# Patient Record
Sex: FEMALE | Race: WHITE | NOT HISPANIC OR LATINO | Employment: OTHER | URBAN - NONMETROPOLITAN AREA
[De-identification: names, ages, dates, MRNs, and addresses within clinical notes are randomized per-mention and may not be internally consistent; named-entity substitution may affect disease eponyms.]

---

## 2023-03-21 ENCOUNTER — APPOINTMENT (OUTPATIENT)
Dept: GENERAL RADIOLOGY | Facility: HOSPITAL | Age: 73
DRG: 189 | End: 2023-03-21
Payer: MEDICARE

## 2023-03-21 ENCOUNTER — APPOINTMENT (OUTPATIENT)
Dept: CT IMAGING | Facility: HOSPITAL | Age: 73
DRG: 189 | End: 2023-03-21
Payer: MEDICARE

## 2023-03-21 ENCOUNTER — HOSPITAL ENCOUNTER (INPATIENT)
Facility: HOSPITAL | Age: 73
LOS: 3 days | Discharge: HOME | DRG: 189 | End: 2023-03-24
Attending: STUDENT IN AN ORGANIZED HEALTH CARE EDUCATION/TRAINING PROGRAM | Admitting: INTERNAL MEDICINE
Payer: MEDICARE

## 2023-03-21 DIAGNOSIS — Z74.09 IMPAIRED MOBILITY AND ADLS: Chronic | ICD-10-CM

## 2023-03-21 DIAGNOSIS — J96.01 ACUTE RESPIRATORY FAILURE WITH HYPOXIA: Primary | ICD-10-CM

## 2023-03-21 DIAGNOSIS — Z78.9 IMPAIRED MOBILITY AND ADLS: Chronic | ICD-10-CM

## 2023-03-21 PROBLEM — J44.1 COPD WITH ACUTE EXACERBATION: Status: ACTIVE | Noted: 2023-03-21

## 2023-03-21 PROBLEM — J96.20 ACUTE-ON-CHRONIC RESPIRATORY FAILURE: Status: ACTIVE | Noted: 2023-03-21

## 2023-03-21 PROBLEM — J96.21 ACUTE ON CHRONIC RESPIRATORY FAILURE WITH HYPOXIA AND HYPERCAPNIA: Status: ACTIVE | Noted: 2023-03-21

## 2023-03-21 PROBLEM — I50.9 ACUTE CONGESTIVE HEART FAILURE: Status: ACTIVE | Noted: 2023-03-21

## 2023-03-21 PROBLEM — J96.22 ACUTE ON CHRONIC RESPIRATORY FAILURE WITH HYPOXIA AND HYPERCAPNIA: Status: ACTIVE | Noted: 2023-03-21

## 2023-03-21 LAB
A-A DO2: ABNORMAL
ALBUMIN SERPL-MCNC: 4.1 G/DL (ref 3.5–5.2)
ALBUMIN/GLOB SERPL: 1.2 G/DL
ALP SERPL-CCNC: 147 U/L (ref 39–117)
ALT SERPL W P-5'-P-CCNC: 23 U/L (ref 1–33)
ANION GAP SERPL CALCULATED.3IONS-SCNC: 10.9 MMOL/L (ref 5–15)
ARTERIAL PATENCY WRIST A: ABNORMAL
AST SERPL-CCNC: 25 U/L (ref 1–32)
ATMOSPHERIC PRESS: 739 MMHG
BASE EXCESS BLDA CALC-SCNC: 7.5 MMOL/L (ref 0–2)
BASOPHILS # BLD AUTO: 0.05 10*3/MM3 (ref 0–0.2)
BASOPHILS NFR BLD AUTO: 0.5 % (ref 0–1.5)
BDY SITE: ABNORMAL
BILIRUB SERPL-MCNC: 0.4 MG/DL (ref 0–1.2)
BUN SERPL-MCNC: 7 MG/DL (ref 8–23)
BUN/CREAT SERPL: 10.1 (ref 7–25)
CALCIUM SPEC-SCNC: 9.4 MG/DL (ref 8.6–10.5)
CHLORIDE SERPL-SCNC: 97 MMOL/L (ref 98–107)
CO2 SERPL-SCNC: 29.1 MMOL/L (ref 22–29)
COHGB MFR BLD: 1.4 % (ref 0–2)
CREAT SERPL-MCNC: 0.69 MG/DL (ref 0.57–1)
CRP SERPL-MCNC: 6.07 MG/DL (ref 0–0.5)
D DIMER PPP FEU-MCNC: 1.41 MCGFEU/ML (ref 0–0.72)
DEPRECATED RDW RBC AUTO: 49.5 FL (ref 37–54)
EGFRCR SERPLBLD CKD-EPI 2021: 92.3 ML/MIN/1.73
EOSINOPHIL # BLD AUTO: 0.17 10*3/MM3 (ref 0–0.4)
EOSINOPHIL NFR BLD AUTO: 1.8 % (ref 0.3–6.2)
ERYTHROCYTE [DISTWIDTH] IN BLOOD BY AUTOMATED COUNT: 15.1 % (ref 12.3–15.4)
FLUAV RNA RESP QL NAA+PROBE: NOT DETECTED
FLUBV RNA RESP QL NAA+PROBE: NOT DETECTED
GAS FLOW AIRWAY: 6 LPM
GEN 5 2HR TROPONIN T REFLEX: <6 NG/L
GLOBULIN UR ELPH-MCNC: 3.5 GM/DL
GLUCOSE SERPL-MCNC: 116 MG/DL (ref 65–99)
HCO3 BLDA-SCNC: 35.2 MMOL/L (ref 22–28)
HCT VFR BLD AUTO: 36.2 % (ref 34–46.6)
HCT VFR BLD CALC: 32 %
HGB BLD-MCNC: 10.8 G/DL (ref 12–15.9)
HOLD SPECIMEN: NORMAL
HOLD SPECIMEN: NORMAL
HYPOCHROMIA BLD QL: NORMAL
IMM GRANULOCYTES # BLD AUTO: 0.06 10*3/MM3 (ref 0–0.05)
IMM GRANULOCYTES NFR BLD AUTO: 0.6 % (ref 0–0.5)
LYMPHOCYTES # BLD AUTO: 1.04 10*3/MM3 (ref 0.7–3.1)
LYMPHOCYTES NFR BLD AUTO: 11 % (ref 19.6–45.3)
Lab: ABNORMAL
Lab: ABNORMAL
MAGNESIUM SERPL-MCNC: 2.4 MG/DL (ref 1.6–2.4)
MCH RBC QN AUTO: 26.7 PG (ref 26.6–33)
MCHC RBC AUTO-ENTMCNC: 29.8 G/DL (ref 31.5–35.7)
MCV RBC AUTO: 89.6 FL (ref 79–97)
METHGB BLD QL: 0.5 % (ref 0–1.5)
MODALITY: ABNORMAL
MONOCYTES # BLD AUTO: 0.61 10*3/MM3 (ref 0.1–0.9)
MONOCYTES NFR BLD AUTO: 6.4 % (ref 5–12)
NEUTROPHILS NFR BLD AUTO: 7.56 10*3/MM3 (ref 1.7–7)
NEUTROPHILS NFR BLD AUTO: 79.7 % (ref 42.7–76)
NOTE: ABNORMAL
NOTIFIED BY: ABNORMAL
NOTIFIED WHO: ABNORMAL
NRBC BLD AUTO-RTO: 0 /100 WBC (ref 0–0.2)
NT-PROBNP SERPL-MCNC: 258.9 PG/ML (ref 0–900)
OXYHGB MFR BLDV: 93.5 % (ref 94–99)
PCO2 BLDA: 67.3 MM HG (ref 35–45)
PCO2 TEMP ADJ BLD: ABNORMAL MM[HG]
PH BLDA: 7.33 PH UNITS (ref 7.35–7.45)
PH, TEMP CORRECTED: ABNORMAL
PHOSPHATE SERPL-MCNC: 3 MG/DL (ref 2.5–4.5)
PLATELET # BLD AUTO: 301 10*3/MM3 (ref 140–450)
PMV BLD AUTO: 9.7 FL (ref 6–12)
PO2 BLDA: 76.6 MM HG (ref 75–100)
PO2 TEMP ADJ BLD: ABNORMAL MM[HG]
POTASSIUM SERPL-SCNC: 4.9 MMOL/L (ref 3.5–5.2)
PROCALCITONIN SERPL-MCNC: 0.06 NG/ML (ref 0–0.25)
PROT SERPL-MCNC: 7.6 G/DL (ref 6–8.5)
RBC # BLD AUTO: 4.04 10*6/MM3 (ref 3.77–5.28)
SAO2 % BLDCOA: 95.4 % (ref 94–100)
SARS-COV-2 RNA RESP QL NAA+PROBE: NOT DETECTED
SMALL PLATELETS BLD QL SMEAR: ADEQUATE
SODIUM SERPL-SCNC: 137 MMOL/L (ref 136–145)
TROPONIN T DELTA: NORMAL
TROPONIN T SERPL HS-MCNC: <6 NG/L
TROPONIN T SERPL HS-MCNC: <6 NG/L
VENTILATOR MODE: ABNORMAL
WBC MORPH BLD: NORMAL
WBC NRBC COR # BLD: 9.49 10*3/MM3 (ref 3.4–10.8)
WHOLE BLOOD HOLD COAG: NORMAL
WHOLE BLOOD HOLD SPECIMEN: NORMAL

## 2023-03-21 PROCEDURE — 25010000002 FUROSEMIDE PER 20 MG: Performed by: EMERGENCY MEDICINE

## 2023-03-21 PROCEDURE — 83735 ASSAY OF MAGNESIUM: CPT | Performed by: STUDENT IN AN ORGANIZED HEALTH CARE EDUCATION/TRAINING PROGRAM

## 2023-03-21 PROCEDURE — 84484 ASSAY OF TROPONIN QUANT: CPT | Performed by: STUDENT IN AN ORGANIZED HEALTH CARE EDUCATION/TRAINING PROGRAM

## 2023-03-21 PROCEDURE — 94799 UNLISTED PULMONARY SVC/PX: CPT

## 2023-03-21 PROCEDURE — 82375 ASSAY CARBOXYHB QUANT: CPT

## 2023-03-21 PROCEDURE — 71275 CT ANGIOGRAPHY CHEST: CPT

## 2023-03-21 PROCEDURE — 99285 EMERGENCY DEPT VISIT HI MDM: CPT

## 2023-03-21 PROCEDURE — 84100 ASSAY OF PHOSPHORUS: CPT | Performed by: STUDENT IN AN ORGANIZED HEALTH CARE EDUCATION/TRAINING PROGRAM

## 2023-03-21 PROCEDURE — 94640 AIRWAY INHALATION TREATMENT: CPT

## 2023-03-21 PROCEDURE — 99223 1ST HOSP IP/OBS HIGH 75: CPT | Performed by: INTERNAL MEDICINE

## 2023-03-21 PROCEDURE — 80053 COMPREHEN METABOLIC PANEL: CPT | Performed by: STUDENT IN AN ORGANIZED HEALTH CARE EDUCATION/TRAINING PROGRAM

## 2023-03-21 PROCEDURE — 94664 DEMO&/EVAL PT USE INHALER: CPT

## 2023-03-21 PROCEDURE — 71045 X-RAY EXAM CHEST 1 VIEW: CPT

## 2023-03-21 PROCEDURE — 86140 C-REACTIVE PROTEIN: CPT | Performed by: STUDENT IN AN ORGANIZED HEALTH CARE EDUCATION/TRAINING PROGRAM

## 2023-03-21 PROCEDURE — 94761 N-INVAS EAR/PLS OXIMETRY MLT: CPT

## 2023-03-21 PROCEDURE — 25510000001 IOPAMIDOL 61 % SOLUTION: Performed by: EMERGENCY MEDICINE

## 2023-03-21 PROCEDURE — 25010000002 METHYLPREDNISOLONE PER 125 MG: Performed by: STUDENT IN AN ORGANIZED HEALTH CARE EDUCATION/TRAINING PROGRAM

## 2023-03-21 PROCEDURE — 63710000001 PREDNISONE PER 1 MG: Performed by: INTERNAL MEDICINE

## 2023-03-21 PROCEDURE — 83880 ASSAY OF NATRIURETIC PEPTIDE: CPT | Performed by: STUDENT IN AN ORGANIZED HEALTH CARE EDUCATION/TRAINING PROGRAM

## 2023-03-21 PROCEDURE — 25010000002 ENOXAPARIN PER 10 MG: Performed by: INTERNAL MEDICINE

## 2023-03-21 PROCEDURE — 36600 WITHDRAWAL OF ARTERIAL BLOOD: CPT

## 2023-03-21 PROCEDURE — 25010000002 MAGNESIUM SULFATE 2 GM/50ML SOLUTION: Performed by: STUDENT IN AN ORGANIZED HEALTH CARE EDUCATION/TRAINING PROGRAM

## 2023-03-21 PROCEDURE — 94660 CPAP INITIATION&MGMT: CPT

## 2023-03-21 PROCEDURE — 83050 HGB METHEMOGLOBIN QUAN: CPT

## 2023-03-21 PROCEDURE — 84145 PROCALCITONIN (PCT): CPT | Performed by: STUDENT IN AN ORGANIZED HEALTH CARE EDUCATION/TRAINING PROGRAM

## 2023-03-21 PROCEDURE — 87636 SARSCOV2 & INF A&B AMP PRB: CPT | Performed by: STUDENT IN AN ORGANIZED HEALTH CARE EDUCATION/TRAINING PROGRAM

## 2023-03-21 PROCEDURE — 93005 ELECTROCARDIOGRAM TRACING: CPT | Performed by: STUDENT IN AN ORGANIZED HEALTH CARE EDUCATION/TRAINING PROGRAM

## 2023-03-21 PROCEDURE — 36415 COLL VENOUS BLD VENIPUNCTURE: CPT

## 2023-03-21 PROCEDURE — 85007 BL SMEAR W/DIFF WBC COUNT: CPT | Performed by: STUDENT IN AN ORGANIZED HEALTH CARE EDUCATION/TRAINING PROGRAM

## 2023-03-21 PROCEDURE — 85379 FIBRIN DEGRADATION QUANT: CPT | Performed by: STUDENT IN AN ORGANIZED HEALTH CARE EDUCATION/TRAINING PROGRAM

## 2023-03-21 PROCEDURE — 82805 BLOOD GASES W/O2 SATURATION: CPT

## 2023-03-21 PROCEDURE — 85025 COMPLETE CBC W/AUTO DIFF WBC: CPT | Performed by: STUDENT IN AN ORGANIZED HEALTH CARE EDUCATION/TRAINING PROGRAM

## 2023-03-21 RX ORDER — LANOLIN ALCOHOL/MO/W.PET/CERES
400 CREAM (GRAM) TOPICAL 2 TIMES DAILY
Status: DISCONTINUED | OUTPATIENT
Start: 2023-03-21 | End: 2023-03-21

## 2023-03-21 RX ORDER — ONDANSETRON 2 MG/ML
4 INJECTION INTRAMUSCULAR; INTRAVENOUS EVERY 6 HOURS PRN
Status: DISCONTINUED | OUTPATIENT
Start: 2023-03-21 | End: 2023-03-24 | Stop reason: HOSPADM

## 2023-03-21 RX ORDER — SODIUM CHLORIDE 0.9 % (FLUSH) 0.9 %
10 SYRINGE (ML) INJECTION EVERY 12 HOURS SCHEDULED
Status: DISCONTINUED | OUTPATIENT
Start: 2023-03-21 | End: 2023-03-24 | Stop reason: HOSPADM

## 2023-03-21 RX ORDER — SODIUM CHLORIDE 0.9 % (FLUSH) 0.9 %
10 SYRINGE (ML) INJECTION AS NEEDED
Status: DISCONTINUED | OUTPATIENT
Start: 2023-03-21 | End: 2023-03-24 | Stop reason: HOSPADM

## 2023-03-21 RX ORDER — IPRATROPIUM BROMIDE AND ALBUTEROL SULFATE 2.5; .5 MG/3ML; MG/3ML
3 SOLUTION RESPIRATORY (INHALATION) EVERY 4 HOURS PRN
Status: DISCONTINUED | OUTPATIENT
Start: 2023-03-21 | End: 2023-03-24 | Stop reason: HOSPADM

## 2023-03-21 RX ORDER — ESCITALOPRAM OXALATE 20 MG/1
20 TABLET ORAL DAILY
Status: DISCONTINUED | OUTPATIENT
Start: 2023-03-22 | End: 2023-03-24 | Stop reason: HOSPADM

## 2023-03-21 RX ORDER — SODIUM CHLORIDE 9 MG/ML
40 INJECTION, SOLUTION INTRAVENOUS AS NEEDED
Status: DISCONTINUED | OUTPATIENT
Start: 2023-03-21 | End: 2023-03-24 | Stop reason: HOSPADM

## 2023-03-21 RX ORDER — IPRATROPIUM BROMIDE AND ALBUTEROL SULFATE 2.5; .5 MG/3ML; MG/3ML
3 SOLUTION RESPIRATORY (INHALATION) ONCE
Status: COMPLETED | OUTPATIENT
Start: 2023-03-21 | End: 2023-03-21

## 2023-03-21 RX ORDER — BUDESONIDE AND FORMOTEROL FUMARATE DIHYDRATE 160; 4.5 UG/1; UG/1
2 AEROSOL RESPIRATORY (INHALATION)
Status: DISCONTINUED | OUTPATIENT
Start: 2023-03-21 | End: 2023-03-24 | Stop reason: HOSPADM

## 2023-03-21 RX ORDER — ARIPIPRAZOLE 2 MG/1
2 TABLET ORAL DAILY
COMMUNITY

## 2023-03-21 RX ORDER — ROPINIROLE 1 MG/1
1 TABLET, FILM COATED ORAL 2 TIMES DAILY PRN
Status: DISCONTINUED | OUTPATIENT
Start: 2023-03-21 | End: 2023-03-24 | Stop reason: HOSPADM

## 2023-03-21 RX ORDER — PRAMIPEXOLE DIHYDROCHLORIDE 1 MG/1
1 TABLET ORAL 2 TIMES DAILY PRN
Status: DISCONTINUED | OUTPATIENT
Start: 2023-03-21 | End: 2023-03-24 | Stop reason: HOSPADM

## 2023-03-21 RX ORDER — GUAIFENESIN/DEXTROMETHORPHAN 100-10MG/5
10 SYRUP ORAL EVERY 6 HOURS PRN
Status: DISCONTINUED | OUTPATIENT
Start: 2023-03-21 | End: 2023-03-24 | Stop reason: HOSPADM

## 2023-03-21 RX ORDER — ACETAMINOPHEN 325 MG/1
650 TABLET ORAL EVERY 4 HOURS PRN
Status: DISCONTINUED | OUTPATIENT
Start: 2023-03-21 | End: 2023-03-24 | Stop reason: HOSPADM

## 2023-03-21 RX ORDER — FUROSEMIDE 10 MG/ML
40 INJECTION INTRAMUSCULAR; INTRAVENOUS ONCE
Status: COMPLETED | OUTPATIENT
Start: 2023-03-21 | End: 2023-03-21

## 2023-03-21 RX ORDER — LISINOPRIL 10 MG/1
10 TABLET ORAL NIGHTLY
COMMUNITY
End: 2023-04-11 | Stop reason: SDUPTHER

## 2023-03-21 RX ORDER — POLYETHYLENE GLYCOL 3350 17 G/17G
17 POWDER, FOR SOLUTION ORAL DAILY PRN
Status: DISCONTINUED | OUTPATIENT
Start: 2023-03-21 | End: 2023-03-24 | Stop reason: HOSPADM

## 2023-03-21 RX ORDER — OMEPRAZOLE 40 MG/1
40 CAPSULE, DELAYED RELEASE ORAL NIGHTLY
COMMUNITY

## 2023-03-21 RX ORDER — IBUPROFEN 400 MG/1
800 TABLET ORAL 2 TIMES DAILY PRN
COMMUNITY

## 2023-03-21 RX ORDER — AMLODIPINE BESYLATE 5 MG/1
2.5 TABLET ORAL NIGHTLY
Status: DISCONTINUED | OUTPATIENT
Start: 2023-03-21 | End: 2023-03-24 | Stop reason: HOSPADM

## 2023-03-21 RX ORDER — CALCIUM CARBONATE 200(500)MG
2 TABLET,CHEWABLE ORAL 2 TIMES DAILY PRN
Status: DISCONTINUED | OUTPATIENT
Start: 2023-03-21 | End: 2023-03-24 | Stop reason: HOSPADM

## 2023-03-21 RX ORDER — ARIPIPRAZOLE 2 MG/1
2 TABLET ORAL DAILY
Status: DISCONTINUED | OUTPATIENT
Start: 2023-03-22 | End: 2023-03-24 | Stop reason: HOSPADM

## 2023-03-21 RX ORDER — AMLODIPINE BESYLATE 2.5 MG/1
2.5 TABLET ORAL NIGHTLY
COMMUNITY

## 2023-03-21 RX ORDER — AMOXICILLIN 250 MG
2 CAPSULE ORAL 2 TIMES DAILY
Status: DISCONTINUED | OUTPATIENT
Start: 2023-03-21 | End: 2023-03-24 | Stop reason: HOSPADM

## 2023-03-21 RX ORDER — IBUPROFEN 800 MG/1
800 TABLET ORAL 2 TIMES DAILY PRN
Status: DISCONTINUED | OUTPATIENT
Start: 2023-03-21 | End: 2023-03-24 | Stop reason: HOSPADM

## 2023-03-21 RX ORDER — ONDANSETRON 4 MG/1
4 TABLET, FILM COATED ORAL EVERY 6 HOURS PRN
Status: DISCONTINUED | OUTPATIENT
Start: 2023-03-21 | End: 2023-03-24 | Stop reason: HOSPADM

## 2023-03-21 RX ORDER — BISACODYL 10 MG
10 SUPPOSITORY, RECTAL RECTAL DAILY PRN
Status: DISCONTINUED | OUTPATIENT
Start: 2023-03-21 | End: 2023-03-24 | Stop reason: HOSPADM

## 2023-03-21 RX ORDER — ESCITALOPRAM OXALATE 20 MG/1
20 TABLET ORAL DAILY
COMMUNITY

## 2023-03-21 RX ORDER — FUROSEMIDE 10 MG/ML
40 INJECTION INTRAMUSCULAR; INTRAVENOUS
Status: DISCONTINUED | OUTPATIENT
Start: 2023-03-22 | End: 2023-03-24 | Stop reason: HOSPADM

## 2023-03-21 RX ORDER — LISINOPRIL 10 MG/1
10 TABLET ORAL NIGHTLY
Status: DISCONTINUED | OUTPATIENT
Start: 2023-03-21 | End: 2023-03-24 | Stop reason: HOSPADM

## 2023-03-21 RX ORDER — PRAMIPEXOLE DIHYDROCHLORIDE 1 MG/1
1 TABLET ORAL 2 TIMES DAILY PRN
COMMUNITY

## 2023-03-21 RX ORDER — ENOXAPARIN SODIUM 100 MG/ML
40 INJECTION SUBCUTANEOUS EVERY 12 HOURS
Status: DISCONTINUED | OUTPATIENT
Start: 2023-03-21 | End: 2023-03-24 | Stop reason: HOSPADM

## 2023-03-21 RX ORDER — PREDNISONE 20 MG/1
40 TABLET ORAL
Status: DISCONTINUED | OUTPATIENT
Start: 2023-03-21 | End: 2023-03-24 | Stop reason: HOSPADM

## 2023-03-21 RX ORDER — LANOLIN ALCOHOL/MO/W.PET/CERES
400 CREAM (GRAM) TOPICAL 2 TIMES DAILY
COMMUNITY

## 2023-03-21 RX ORDER — ROPINIROLE 1 MG/1
1 TABLET, FILM COATED ORAL 2 TIMES DAILY PRN
COMMUNITY
End: 2023-04-11

## 2023-03-21 RX ORDER — PANTOPRAZOLE SODIUM 40 MG/1
40 TABLET, DELAYED RELEASE ORAL
Status: DISCONTINUED | OUTPATIENT
Start: 2023-03-22 | End: 2023-03-24 | Stop reason: HOSPADM

## 2023-03-21 RX ORDER — BISACODYL 5 MG/1
5 TABLET, DELAYED RELEASE ORAL DAILY PRN
Status: DISCONTINUED | OUTPATIENT
Start: 2023-03-21 | End: 2023-03-24 | Stop reason: HOSPADM

## 2023-03-21 RX ORDER — MAGNESIUM SULFATE HEPTAHYDRATE 40 MG/ML
2 INJECTION, SOLUTION INTRAVENOUS ONCE
Status: COMPLETED | OUTPATIENT
Start: 2023-03-21 | End: 2023-03-21

## 2023-03-21 RX ORDER — METHYLPREDNISOLONE SODIUM SUCCINATE 125 MG/2ML
125 INJECTION, POWDER, LYOPHILIZED, FOR SOLUTION INTRAMUSCULAR; INTRAVENOUS ONCE
Status: COMPLETED | OUTPATIENT
Start: 2023-03-21 | End: 2023-03-21

## 2023-03-21 RX ORDER — GUAIFENESIN 200 MG/1
400 TABLET ORAL 2 TIMES DAILY
COMMUNITY
End: 2023-04-11

## 2023-03-21 RX ORDER — CHOLECALCIFEROL (VITAMIN D3) 125 MCG
5 CAPSULE ORAL NIGHTLY
Status: DISCONTINUED | OUTPATIENT
Start: 2023-03-21 | End: 2023-03-24 | Stop reason: HOSPADM

## 2023-03-21 RX ADMIN — Medication 5 MG: at 23:08

## 2023-03-21 RX ADMIN — IBUPROFEN 800 MG: 800 TABLET, FILM COATED ORAL at 23:08

## 2023-03-21 RX ADMIN — METHYLPREDNISOLONE SODIUM SUCCINATE 125 MG: 125 INJECTION, POWDER, FOR SOLUTION INTRAMUSCULAR; INTRAVENOUS at 17:19

## 2023-03-21 RX ADMIN — IPRATROPIUM BROMIDE AND ALBUTEROL SULFATE 3 ML: .5; 3 SOLUTION RESPIRATORY (INHALATION) at 17:16

## 2023-03-21 RX ADMIN — PRAMIPEXOLE DIHYDROCHLORIDE 1 MG: 1 TABLET ORAL at 23:08

## 2023-03-21 RX ADMIN — MAGNESIUM SULFATE HEPTAHYDRATE 2 G: 40 INJECTION, SOLUTION INTRAVENOUS at 17:20

## 2023-03-21 RX ADMIN — Medication 10 ML: at 23:09

## 2023-03-21 RX ADMIN — PREDNISONE 40 MG: 20 TABLET ORAL at 23:07

## 2023-03-21 RX ADMIN — LISINOPRIL 10 MG: 10 TABLET ORAL at 23:08

## 2023-03-21 RX ADMIN — IOPAMIDOL 100 ML: 612 INJECTION, SOLUTION INTRAVENOUS at 19:26

## 2023-03-21 RX ADMIN — FUROSEMIDE 40 MG: 10 INJECTION, SOLUTION INTRAMUSCULAR; INTRAVENOUS at 20:51

## 2023-03-21 RX ADMIN — ENOXAPARIN SODIUM 40 MG: 100 INJECTION SUBCUTANEOUS at 23:08

## 2023-03-21 RX ADMIN — ROPINIROLE HYDROCHLORIDE 1 MG: 1 TABLET, FILM COATED ORAL at 23:08

## 2023-03-21 RX ADMIN — AMLODIPINE BESYLATE 2.5 MG: 5 TABLET ORAL at 23:08

## 2023-03-21 NOTE — ED PROVIDER NOTES
Subjective  History of Present Illness:    Patient is a 73-year-old female with history of COPD and hypertension who presents today with shortness of breath.  Patient states that over the last 3 days she has had increasing shortness of breath and dyspnea with exertion.  States that she is recently moved to the area and had been moving into her new home.  She states that today she was unable to get up out of chair due to dyspnea and she presented to our emergency department for evaluation.  Initial pulse ox sat of 56% on presentation.  She denies any chest pain at this time.  Nonproductive cough.  Denies any preceding fever or chills.  No abdominal pain, nausea, vomiting, diarrhea.  Denies any urinary symptoms.  No abnormal vaginal bleeding or discharge.  Denies any unilateral leg swelling or leg pain.  Patient does have significant bilateral pedal edema which she says is baseline for her.  She denies history of heart failure.      Nurses Notes reviewed and agree, including vitals, allergies, social history and prior medical history.     REVIEW OF SYSTEMS: All systems reviewed and not pertinent unless noted.  Review of Systems   Constitutional: Positive for activity change and fatigue. Negative for appetite change, chills and fever.   HENT: Negative for rhinorrhea, sinus pressure and sinus pain.    Eyes: Negative for discharge and itching.   Respiratory: Positive for cough and shortness of breath. Negative for wheezing.    Cardiovascular: Negative for chest pain and leg swelling.   Gastrointestinal: Negative for abdominal distention, abdominal pain, nausea and vomiting.   Endocrine: Negative for cold intolerance and heat intolerance.   Genitourinary: Negative for decreased urine volume, difficulty urinating, flank pain, frequency, urgency, vaginal bleeding, vaginal discharge and vaginal pain.   Musculoskeletal: Negative for gait problem, neck pain and neck stiffness.   Skin: Negative for color change.  "  Allergic/Immunologic: Negative for environmental allergies.   Neurological: Negative for seizures, syncope, facial asymmetry and speech difficulty.   Psychiatric/Behavioral: Negative for self-injury and suicidal ideas.       Past Medical History:   Diagnosis Date   • COPD (chronic obstructive pulmonary disease) (HCC)    • Hypertension        Allergies:    Codeine, Sulfa antibiotics, and Tetracyclines & related      Past Surgical History:   Procedure Laterality Date   • BREAST BIOPSY     • VENA CAVA FILTER PLACEMENT           Social History     Socioeconomic History   • Marital status: Single   Tobacco Use   • Smoking status: Former     Packs/day: 1.00     Years: 25.00     Pack years: 25.00     Types: Cigarettes   Substance and Sexual Activity   • Alcohol use: Yes     Comment: occassional   • Drug use: Never         History reviewed. No pertinent family history.    Objective  Physical Exam:  /66   Pulse 80   Temp 98.3 °F (36.8 °C) (Oral)   Resp 22   Ht 160 cm (63\")   Wt 90.7 kg (200 lb)   SpO2 96%   BMI 35.43 kg/m²      Physical Exam  Constitutional:       General: She is not in acute distress.     Appearance: Normal appearance. She is obese. She is not ill-appearing.   HENT:      Head: Normocephalic and atraumatic.      Nose: Nose normal. No congestion or rhinorrhea.      Mouth/Throat:      Mouth: Mucous membranes are dry.      Pharynx: Oropharynx is clear. No oropharyngeal exudate or posterior oropharyngeal erythema.   Eyes:      Extraocular Movements: Extraocular movements intact.      Conjunctiva/sclera: Conjunctivae normal.      Pupils: Pupils are equal, round, and reactive to light.   Cardiovascular:      Rate and Rhythm: Normal rate and regular rhythm.      Pulses: Normal pulses.      Heart sounds: Normal heart sounds.   Pulmonary:      Effort: Pulmonary effort is normal. Tachypnea present. No respiratory distress.      Breath sounds: Examination of the right-upper field reveals wheezing. " Examination of the left-upper field reveals wheezing. Examination of the right-middle field reveals decreased breath sounds. Examination of the left-middle field reveals decreased breath sounds. Examination of the right-lower field reveals decreased breath sounds. Examination of the left-lower field reveals decreased breath sounds. Decreased breath sounds and wheezing present.   Abdominal:      General: Abdomen is flat. Bowel sounds are normal. There is no distension.      Palpations: Abdomen is soft.      Tenderness: There is no abdominal tenderness.   Musculoskeletal:         General: No swelling or tenderness. Normal range of motion.      Cervical back: Normal range of motion and neck supple.   Skin:     General: Skin is warm and dry.      Capillary Refill: Capillary refill takes less than 2 seconds.   Neurological:      General: No focal deficit present.      Mental Status: She is alert and oriented to person, place, and time. Mental status is at baseline.      Cranial Nerves: No cranial nerve deficit.      Sensory: No sensory deficit.      Motor: No weakness.      Coordination: Coordination normal.   Psychiatric:         Mood and Affect: Mood normal.         Behavior: Behavior normal.         Thought Content: Thought content normal.         Judgment: Judgment normal.         Critical Care  Performed by: Akash Rocha DO  Authorized by: Akash Rocha DO     Critical care provider statement:     Critical care time (minutes):  36    Critical care time was exclusive of:  Separately billable procedures and treating other patients    Critical care was necessary to treat or prevent imminent or life-threatening deterioration of the following conditions:  Respiratory failure    Critical care was time spent personally by me on the following activities:  Development of treatment plan with patient or surrogate, evaluation of patient's response to treatment, examination of patient, obtaining history from patient or  surrogate, ordering and performing treatments and interventions, ordering and review of laboratory studies, ordering and review of radiographic studies, pulse oximetry, re-evaluation of patient's condition and review of old charts    I assumed direction of critical care for this patient from another provider in my specialty: yes      Care discussed with: admitting provider          ED Course:    ED Course as of 03/21/23 2107   Tue Mar 21, 2023   1731 EKG interpreted by me, normal sinus rhythm with no concerning ST changes noted, rate of 79 [JE]      ED Course User Index  [JE] Dustin Dunham MD       Lab Results (last 24 hours)     Procedure Component Value Units Date/Time    CBC & Differential [[id number]]  (Abnormal) Collected: 03/21/23 1716    Specimen: Blood Updated: 03/21/23 1743    Narrative:      The following orders were created for panel order CBC & Differential.  Procedure                               Abnormality         Status                     ---------                               -----------         ------                     CBC Auto Differential[[id number]]        Abnormal            Final result               Scan Slide[[id number]]                                       Final result                 Please view results for these tests on the individual orders.    Comprehensive Metabolic Panel [[id number]]  (Abnormal) Collected: 03/21/23 1716    Specimen: Blood Updated: 03/21/23 1740     Glucose 116 mg/dL      BUN 7 mg/dL      Creatinine 0.69 mg/dL      Sodium 137 mmol/L      Potassium 4.9 mmol/L      Comment: Slight hemolysis detected by analyzer. Results may be affected.        Chloride 97 mmol/L      CO2 29.1 mmol/L      Calcium 9.4 mg/dL      Total Protein 7.6 g/dL      Albumin 4.1 g/dL      ALT (SGPT) 23 U/L      AST (SGOT) 25 U/L      Alkaline Phosphatase 147 U/L      Total Bilirubin 0.4 mg/dL      Globulin 3.5 gm/dL      A/G Ratio 1.2 g/dL      BUN/Creatinine Ratio 10.1     Anion Gap 10.9  "mmol/L      eGFR 92.3 mL/min/1.73     Narrative:      GFR Normal >60  Chronic Kidney Disease <60  Kidney Failure <15    The GFR formula is only valid for adults with stable renal function between ages 18 and 70.    BNP [[id number]]  (Normal) Collected: 03/21/23 1716    Specimen: Blood Updated: 03/21/23 1743     proBNP 258.9 pg/mL     Narrative:      Among patients with dyspnea, NT-proBNP is highly sensitive for the detection of acute congestive heart failure. In addition NT-proBNP of <300 pg/ml effectively rules out acute congestive heart failure with 99% negative predictive value.    Results may be falsely decreased if patient taking Biotin.      D-dimer, Quantitative [[id number]]  (Abnormal) Collected: 03/21/23 1716    Specimen: Blood Updated: 03/21/23 1807     D-Dimer, Quantitative 1.41 MCGFEU/mL     Narrative:      According to the assay 's published package insert, a normal (<0.50 MCGFEU/mL) D-dimer result in conjunction with a non-high clinical probability assessment, excludes deep vein thrombosis (DVT) and pulmonary embolism (PE) with high sensitivity.    D-dimer values increase with age and this can make VTE exclusion of an older population difficult. To address this, the [employer], based on best available evidence and recent guidelines, recommends that clinicians use age-adjusted D-dimer thresholds in patients greater than 50 years of age with: a) a low probability of PE who do not meet all Pulmonary Embolism Rule Out Criteria, or b) in those with intermediate probability of PE.   The formula for an age-adjusted D-dimer cut-off is \"age/100\".  For example, a 60 year old patient would have an age-adjusted cut-off of 0.60 MCGFEU/mL and an 80 year old 0.80 MCGFEU/mL.    High Sensitivity Troponin T [[id number]]  (Normal) Collected: 03/21/23 1716    Specimen: Blood Updated: 03/21/23 1743     HS Troponin T <6 ng/L     Narrative:      High Sensitive Troponin T Reference Range:  <10.0 " "ng/L- Negative Female for AMI  <15.0 ng/L- Negative Male for AMI  >=10 - Abnormal Female indicating possible myocardial injury.  >=15 - Abnormal Male indicating possible myocardial injury.   Clinicians would have to utilize clinical acumen, EKG, Troponin, and serial changes to determine if it is an Acute Myocardial Infarction or myocardial injury due to an underlying chronic condition.         C-reactive Protein [[id number]]  (Abnormal) Collected: 03/21/23 1716    Specimen: Blood Updated: 03/21/23 1740     C-Reactive Protein 6.07 mg/dL     Procalcitonin [[id number]]  (Normal) Collected: 03/21/23 1716    Specimen: Blood Updated: 03/21/23 1749     Procalcitonin 0.06 ng/mL     Narrative:      As a Marker for Sepsis (Non-Neonates):    1. <0.5 ng/mL represents a low risk of severe sepsis and/or septic shock.  2. >2 ng/mL represents a high risk of severe sepsis and/or septic shock.    As a Marker for Lower Respiratory Tract Infections that require antibiotic therapy:    PCT on Admission    Antibiotic Therapy       6-12 Hrs later    >0.5                Strongly Recommended  >0.25 - <0.5        Recommended   0.1 - 0.25          Discouraged              Remeasure/reassess PCT  <0.1                Strongly Discouraged     Remeasure/reassess PCT    As 28 day mortality risk marker: \"Change in Procalcitonin Result\" (>80% or <=80%) if Day 0 (or Day 1) and Day 4 values are available. Refer to http://www.[facility]-pct-calculator.com    Change in PCT <=80%  A decrease of PCT levels below or equal to 80% defines a positive change in PCT test result representing a higher risk for 28-day all-cause mortality of patients diagnosed with severe sepsis for septic shock.    Change in PCT >80%  A decrease of PCT levels of more than 80% defines a negative change in PCT result representing a lower risk for 28-day all-cause mortality of patients diagnosed with severe sepsis or septic shock.       Magnesium [[id number]]  (Normal) Collected: 03/21/23 " 1716    Specimen: Blood Updated: 03/21/23 1740     Magnesium 2.4 mg/dL     Phosphorus [[id number]]  (Normal) Collected: 03/21/23 1716    Specimen: Blood Updated: 03/21/23 1740     Phosphorus 3.0 mg/dL     CBC Auto Differential [[id number]]  (Abnormal) Collected: 03/21/23 1716    Specimen: Blood Updated: 03/21/23 1743     WBC 9.49 10*3/mm3      RBC 4.04 10*6/mm3      Hemoglobin 10.8 g/dL      Hematocrit 36.2 %      MCV 89.6 fL      MCH 26.7 pg      MCHC 29.8 g/dL      RDW 15.1 %      RDW-SD 49.5 fl      MPV 9.7 fL      Platelets 301 10*3/mm3      Neutrophil % 79.7 %      Lymphocyte % 11.0 %      Monocyte % 6.4 %      Eosinophil % 1.8 %      Basophil % 0.5 %      Immature Grans % 0.6 %      Neutrophils, Absolute 7.56 10*3/mm3      Lymphocytes, Absolute 1.04 10*3/mm3      Monocytes, Absolute 0.61 10*3/mm3      Eosinophils, Absolute 0.17 10*3/mm3      Basophils, Absolute 0.05 10*3/mm3      Immature Grans, Absolute 0.06 10*3/mm3      nRBC 0.0 /100 WBC     Single High Sensitivity Troponin T [[id number]]  (Normal) Collected: 03/21/23 1716    Specimen: Blood Updated: 03/21/23 1743     HS Troponin T <6 ng/L     Narrative:      High Sensitive Troponin T Reference Range:  <10.0 ng/L- Negative Female for AMI  <15.0 ng/L- Negative Male for AMI  >=10 - Abnormal Female indicating possible myocardial injury.  >=15 - Abnormal Male indicating possible myocardial injury.   Clinicians would have to utilize clinical acumen, EKG, Troponin, and serial changes to determine if it is an Acute Myocardial Infarction or myocardial injury due to an underlying chronic condition.         Scan Slide [[id number]] Collected: 03/21/23 1716    Specimen: Blood Updated: 03/21/23 1743     Hypochromia Slight/1+     WBC Morphology Normal     Platelet Estimate Adequate    COVID-19 and FLU A/B PCR - Swab, Nasopharynx [[id number]]  (Normal) Collected: 03/21/23 1717    Specimen: Swab from Nasopharynx Updated: 03/21/23 1744     COVID19 Not Detected     Influenza A  PCR Not Detected     Influenza B PCR Not Detected    Narrative:      Fact sheet for providers: https://www.fda.gov/media/623497/download    Fact sheet for patients: https://www.fda.gov/media/191045/download    Test performed by PCR.    Blood Gas, Arterial With Co-Ox [[id number]]  (Abnormal) Collected: 03/21/23 1723    Specimen: Arterial Blood Updated: 03/21/23 1723     Site Right Radial     Reddy's Test N/A     pH, Arterial 7.327 pH units      Comment: 84 Value below reference range        pCO2, Arterial 67.3 mm Hg      Comment: 86 Value above critical limit        pO2, Arterial 76.6 mm Hg      Comment: 84 Value below reference range        HCO3, Arterial 35.2 mmol/L      Comment: 83 Value above reference range        Base Excess, Arterial 7.5 mmol/L      Comment: 83 Value above reference range        O2 Saturation, Arterial 95.4 %      Hematocrit, Blood Gas 32.0 %      Comment: 84 Value below reference range        Oxyhemoglobin 93.5 %      Comment: 84 Value below reference range        Methemoglobin 0.50 %      Carboxyhemoglobin 1.4 %      A-a DO2 --     Comment: UNABLE TO CALCULATE        Barometric Pressure for Blood Gas 739 mmHg      Modality Nasal Cannula     Flow Rate 6.0 lpm      Ventilator Mode NA     Note --     Notified Who ROBERT HERNANDEZ MD     Notified By [id number]     Notified Time 03/21/2023 16:36     Collected by ELMA RRT     Comment: Meter: [id number]     :  [id number]        pH, Temp Corrected --     pCO2, Temperature Corrected --     pO2, Temperature Corrected --    High Sensitivity Troponin T 2Hr [[id number]] Collected: 03/21/23 1942    Specimen: Blood Updated: 03/21/23 2011     HS Troponin T <6 ng/L      Troponin T Delta --     Comment: Unable to calculate.       Narrative:      High Sensitive Troponin T Reference Range:  <10.0 ng/L- Negative Female for AMI  <15.0 ng/L- Negative Male for AMI  >=10 - Abnormal Female indicating possible myocardial injury.  >=15 - Abnormal Male indicating possible  myocardial injury.   Clinicians would have to utilize clinical acumen, EKG, Troponin, and serial changes to determine if it is an Acute Myocardial Infarction or myocardial injury due to an underlying chronic condition.                CT Angiogram Chest Pulmonary Embolism    Result Date: 3/21/2023  FINAL REPORT TECHNIQUE: Thin section axial images were obtained through the chest and during the arterial phase of IV contrast administration. Coronal 3-D MIP reconstructed images were also provided. CLINICAL HISTORY: Pulmonary embolism (PE) suspected, positive D-dimer  soa FINDINGS: The pulmonary arteries are well-opacified and there is no filling defect to indicate pulmonary embolism. The thoracic aorta is normal.  There are small bilateral pleural effusions and interstitial pulmonary edema.  There is mild mediastinal adenopathy also likely due to edema.  There is no significant osseous abnormality.     Impression: No pulmonary embolism.  Interstitial pulmonary edema with small pleural effusions. Authenticated and Electronically Signed by Semaj Marshall M.D. on 03/21/2023 08:54:31 PM         MDM    Initial impression of presenting illness: Shortness of breath    DDX: includes but is not limited to: PE, bacterial pneumonia, viral URI, COPD exacerbation, heart failure exacerbation    Patient arrives guarded with vitals interpreted by myself.     Pertinent features from physical exam: Nonpitting pedal edema bilateral, wheezing in the upper lobes with decreased sounds in the lower lobes, tachypneic on exam.    Initial diagnostic plan: CBC, CMP, troponin, ECG, chest x-ray, CRP, Pro-Johnathon, mag, Phos    Results from initial plan were reviewed and interpreted by me revealing elevated D-dimer, CT PE ordered  21:07 EDT  I received care from Dr. Dunham for follow-up of CT.  CT reveals interstitial pulmonary edema with small pleural effusions.  No PE.  Added Lasix.  Patient still on BiPAP due to oxygen desaturations with light  activity.  Diagnostic information from other sources: Old record review    Interventions / Re-evaluation: Given DuoNeb, magnesium, Solu-Medrol with improvement of symptoms    Results/clinical rationale were discussed with patient and family member at the bedside    Consultations/Discussion of results with other physicians: Dr. Fu    Disposition plan: Admission for further care  -----    Final diagnoses:   Acute respiratory failure with hypoxia (HCC)        Akash Rocha,   03/21/23 0985

## 2023-03-22 ENCOUNTER — APPOINTMENT (OUTPATIENT)
Dept: CARDIOLOGY | Facility: HOSPITAL | Age: 73
DRG: 189 | End: 2023-03-22
Payer: MEDICARE

## 2023-03-22 LAB
ALBUMIN SERPL-MCNC: 4 G/DL (ref 3.5–5.2)
ALBUMIN/GLOB SERPL: 1.2 G/DL
ALP SERPL-CCNC: 142 U/L (ref 39–117)
ALT SERPL W P-5'-P-CCNC: 20 U/L (ref 1–33)
ANION GAP SERPL CALCULATED.3IONS-SCNC: 9.8 MMOL/L (ref 5–15)
AST SERPL-CCNC: 20 U/L (ref 1–32)
BH CV ECHO MEAS - AO MAX PG: 18.3 MMHG
BH CV ECHO MEAS - AO MEAN PG: 10 MMHG
BH CV ECHO MEAS - AO ROOT DIAM: 2.6 CM
BH CV ECHO MEAS - AO V2 MAX: 214 CM/SEC
BH CV ECHO MEAS - AO V2 VTI: 47.2 CM
BH CV ECHO MEAS - AVA(I,D): 1.85 CM2
BH CV ECHO MEAS - EDV(CUBED): 130.3 ML
BH CV ECHO MEAS - EDV(MOD-SP2): 94.5 ML
BH CV ECHO MEAS - EDV(MOD-SP4): 71.6 ML
BH CV ECHO MEAS - EF(MOD-BP): 71.6 %
BH CV ECHO MEAS - EF(MOD-SP2): 76.1 %
BH CV ECHO MEAS - EF(MOD-SP4): 67.3 %
BH CV ECHO MEAS - ESV(CUBED): 38.3 ML
BH CV ECHO MEAS - ESV(MOD-SP2): 22.6 ML
BH CV ECHO MEAS - ESV(MOD-SP4): 23.4 ML
BH CV ECHO MEAS - FS: 33.5 %
BH CV ECHO MEAS - IVS/LVPW: 1.04 CM
BH CV ECHO MEAS - IVSD: 1.17 CM
BH CV ECHO MEAS - LA DIMENSION: 3.6 CM
BH CV ECHO MEAS - LAT PEAK E' VEL: 9.7 CM/SEC
BH CV ECHO MEAS - LV MASS(C)D: 223.9 GRAMS
BH CV ECHO MEAS - LV MAX PG: 10.1 MMHG
BH CV ECHO MEAS - LV MEAN PG: 6 MMHG
BH CV ECHO MEAS - LV V1 MAX: 159 CM/SEC
BH CV ECHO MEAS - LV V1 VTI: 35 CM
BH CV ECHO MEAS - LVIDD: 5.1 CM
BH CV ECHO MEAS - LVIDS: 3.4 CM
BH CV ECHO MEAS - LVOT AREA: 2.49 CM2
BH CV ECHO MEAS - LVOT DIAM: 1.78 CM
BH CV ECHO MEAS - LVPWD: 1.12 CM
BH CV ECHO MEAS - MED PEAK E' VEL: 9.8 CM/SEC
BH CV ECHO MEAS - MV A MAX VEL: 131 CM/SEC
BH CV ECHO MEAS - MV DEC TIME: 0.18 MSEC
BH CV ECHO MEAS - MV E MAX VEL: 111 CM/SEC
BH CV ECHO MEAS - MV E/A: 0.85
BH CV ECHO MEAS - MV MAX PG: 10.5 MMHG
BH CV ECHO MEAS - MV MEAN PG: 4 MMHG
BH CV ECHO MEAS - MV V2 VTI: 41.3 CM
BH CV ECHO MEAS - MVA(VTI): 2.11 CM2
BH CV ECHO MEAS - PA ACC TIME: 0.14 SEC
BH CV ECHO MEAS - PA PR(ACCEL): 17.3 MMHG
BH CV ECHO MEAS - PA V2 MAX: 123 CM/SEC
BH CV ECHO MEAS - RAP SYSTOLE: 15 MMHG
BH CV ECHO MEAS - RV MAX PG: 3.4 MMHG
BH CV ECHO MEAS - RV V1 MAX: 92.2 CM/SEC
BH CV ECHO MEAS - RV V1 VTI: 22.5 CM
BH CV ECHO MEAS - SV(LVOT): 87.1 ML
BH CV ECHO MEAS - SV(MOD-SP2): 71.9 ML
BH CV ECHO MEAS - SV(MOD-SP4): 48.2 ML
BH CV ECHO MEAS - TAPSE (>1.6): 2.21 CM
BH CV ECHO MEASUREMENTS AVERAGE E/E' RATIO: 11.38
BH CV XLRA - TDI S': 11.5 CM/SEC
BILIRUB SERPL-MCNC: 0.3 MG/DL (ref 0–1.2)
BUN SERPL-MCNC: 12 MG/DL (ref 8–23)
BUN/CREAT SERPL: 14.3 (ref 7–25)
CALCIUM SPEC-SCNC: 9.1 MG/DL (ref 8.6–10.5)
CHLORIDE SERPL-SCNC: 94 MMOL/L (ref 98–107)
CO2 SERPL-SCNC: 33.2 MMOL/L (ref 22–29)
CREAT SERPL-MCNC: 0.84 MG/DL (ref 0.57–1)
DEPRECATED RDW RBC AUTO: 50.2 FL (ref 37–54)
EGFRCR SERPLBLD CKD-EPI 2021: 73.9 ML/MIN/1.73
ERYTHROCYTE [DISTWIDTH] IN BLOOD BY AUTOMATED COUNT: 14.9 % (ref 12.3–15.4)
GLOBULIN UR ELPH-MCNC: 3.3 GM/DL
GLUCOSE SERPL-MCNC: 237 MG/DL (ref 65–99)
HCT VFR BLD AUTO: 34.2 % (ref 34–46.6)
HGB BLD-MCNC: 9.9 G/DL (ref 12–15.9)
LEFT ATRIUM VOLUME INDEX: 28.8 ML/M2
MAXIMAL PREDICTED HEART RATE: 148 BPM
MCH RBC QN AUTO: 26.3 PG (ref 26.6–33)
MCHC RBC AUTO-ENTMCNC: 28.9 G/DL (ref 31.5–35.7)
MCV RBC AUTO: 91 FL (ref 79–97)
PLATELET # BLD AUTO: 299 10*3/MM3 (ref 140–450)
PMV BLD AUTO: 9.4 FL (ref 6–12)
POTASSIUM SERPL-SCNC: 5.2 MMOL/L (ref 3.5–5.2)
PROT SERPL-MCNC: 7.3 G/DL (ref 6–8.5)
RBC # BLD AUTO: 3.76 10*6/MM3 (ref 3.77–5.28)
SODIUM SERPL-SCNC: 137 MMOL/L (ref 136–145)
STRESS TARGET HR: 126 BPM
WBC NRBC COR # BLD: 6.82 10*3/MM3 (ref 3.4–10.8)

## 2023-03-22 PROCEDURE — 63710000001 PREDNISONE PER 1 MG: Performed by: INTERNAL MEDICINE

## 2023-03-22 PROCEDURE — 85027 COMPLETE CBC AUTOMATED: CPT | Performed by: INTERNAL MEDICINE

## 2023-03-22 PROCEDURE — 99232 SBSQ HOSP IP/OBS MODERATE 35: CPT | Performed by: STUDENT IN AN ORGANIZED HEALTH CARE EDUCATION/TRAINING PROGRAM

## 2023-03-22 PROCEDURE — 94660 CPAP INITIATION&MGMT: CPT

## 2023-03-22 PROCEDURE — 80053 COMPREHEN METABOLIC PANEL: CPT | Performed by: INTERNAL MEDICINE

## 2023-03-22 PROCEDURE — 94799 UNLISTED PULMONARY SVC/PX: CPT

## 2023-03-22 PROCEDURE — 25010000002 FUROSEMIDE PER 20 MG: Performed by: INTERNAL MEDICINE

## 2023-03-22 PROCEDURE — 93306 TTE W/DOPPLER COMPLETE: CPT

## 2023-03-22 PROCEDURE — 25010000002 ENOXAPARIN PER 10 MG: Performed by: INTERNAL MEDICINE

## 2023-03-22 PROCEDURE — 93306 TTE W/DOPPLER COMPLETE: CPT | Performed by: INTERNAL MEDICINE

## 2023-03-22 RX ADMIN — ROPINIROLE HYDROCHLORIDE 1 MG: 1 TABLET, FILM COATED ORAL at 21:05

## 2023-03-22 RX ADMIN — TIOTROPIUM BROMIDE INHALATION SPRAY 2 PUFF: 3.12 SPRAY, METERED RESPIRATORY (INHALATION) at 07:33

## 2023-03-22 RX ADMIN — ENOXAPARIN SODIUM 40 MG: 100 INJECTION SUBCUTANEOUS at 09:52

## 2023-03-22 RX ADMIN — AMLODIPINE BESYLATE 2.5 MG: 5 TABLET ORAL at 21:06

## 2023-03-22 RX ADMIN — LISINOPRIL 10 MG: 10 TABLET ORAL at 21:05

## 2023-03-22 RX ADMIN — PREDNISONE 40 MG: 20 TABLET ORAL at 08:00

## 2023-03-22 RX ADMIN — ESCITALOPRAM OXALATE 20 MG: 20 TABLET ORAL at 08:00

## 2023-03-22 RX ADMIN — MAGNESIUM GLUCONATE 500 MG ORAL TABLET 400 MG: 500 TABLET ORAL at 00:04

## 2023-03-22 RX ADMIN — BUDESONIDE AND FORMOTEROL FUMARATE DIHYDRATE 2 PUFF: 160; 4.5 AEROSOL RESPIRATORY (INHALATION) at 21:06

## 2023-03-22 RX ADMIN — BUDESONIDE AND FORMOTEROL FUMARATE DIHYDRATE 2 PUFF: 160; 4.5 AEROSOL RESPIRATORY (INHALATION) at 00:52

## 2023-03-22 RX ADMIN — FUROSEMIDE 40 MG: 10 INJECTION, SOLUTION INTRAMUSCULAR; INTRAVENOUS at 08:01

## 2023-03-22 RX ADMIN — MAGNESIUM GLUCONATE 500 MG ORAL TABLET 400 MG: 500 TABLET ORAL at 21:06

## 2023-03-22 RX ADMIN — Medication 10 ML: at 21:05

## 2023-03-22 RX ADMIN — PRAMIPEXOLE DIHYDROCHLORIDE 1 MG: 1 TABLET ORAL at 21:06

## 2023-03-22 RX ADMIN — MAGNESIUM GLUCONATE 500 MG ORAL TABLET 400 MG: 500 TABLET ORAL at 08:02

## 2023-03-22 RX ADMIN — ARIPIPRAZOLE 2 MG: 2 TABLET ORAL at 08:01

## 2023-03-22 RX ADMIN — PANTOPRAZOLE SODIUM 40 MG: 40 TABLET, DELAYED RELEASE ORAL at 06:02

## 2023-03-22 RX ADMIN — Medication 10 ML: at 08:02

## 2023-03-22 RX ADMIN — FUROSEMIDE 40 MG: 10 INJECTION, SOLUTION INTRAMUSCULAR; INTRAVENOUS at 17:05

## 2023-03-22 RX ADMIN — ENOXAPARIN SODIUM 40 MG: 100 INJECTION SUBCUTANEOUS at 21:06

## 2023-03-22 RX ADMIN — BUDESONIDE AND FORMOTEROL FUMARATE DIHYDRATE 2 PUFF: 160; 4.5 AEROSOL RESPIRATORY (INHALATION) at 07:33

## 2023-03-22 NOTE — PAYOR COMM NOTE
"TO:BC  FROM:CELIA GÓMEZ, RN PHONE [phone]  IN CLINICALS REF# [id number]    Deisi Alvarez (73 y.o. Female)     Date of Birth   1950    Social Security Number       Address   [address] [place], [place] [address]    Home Phone   [phone]    MRN   [id number]       Buddhism   None    Marital Status   Single                            Admission Date   3/21/23    Admission Type   Emergency    Admitting Provider   Bird Fu DO    Attending Provider   Kerley, Brian Joseph, DO    Department, Room/Bed   [facility] TELEMETRY 3, 320/1       Discharge Date       Discharge Disposition       Discharge Destination                               Attending Provider: Kerley, Brian Joseph, DO    Allergies: Codeine, Sulfa Antibiotics, Tetracyclines & Related    Isolation: None   Infection: COVID (rule out) (23)   Code Status: CPR    Ht: 160 cm (63\")   Wt: 119 kg (261 lb 7.5 oz)    Admission Cmt: None   Principal Problem: Acute on chronic respiratory failure with hypoxia and hypercapnia (HCC) [J96.21,J96.22]                 Active Insurance as of 3/21/2023     Primary Coverage     Payor Plan Insurance Group Employer/Plan Group    ANTHEM MEDICARE REPLACEMENT ANTHEM MEDICARE ADVANTAGE [id number]     Payor Plan Address Payor Plan Phone Number Payor Plan Fax Number Effective Dates    [address] [phone]  2022 - None Entered    [facility] [id number]       Subscriber Name Subscriber Birth Date Member ID       DEISI ALVAREZ 1950 [id number]                 Emergency Contacts      (Rel.) Home Phone Work Phone Mobile Phone    DES DUVAL (Daughter) [phone] -- --    CastilloRodrigo (Son) -- -- [phone]               History & Physical      Bird Fu DO at 23 2234            [facility] HOSPITALIST   HISTORY AND PHYSICAL      Name:  Deisi Alvarez   Age:  73 y.o.  Sex:  female  :  1950  MRN:  [id number] "   Visit Number:  [id number]  Admission Date:  3/21/2023  Date Of Service:  03/21/23  Primary Care Physician:  Provider, No Known    Chief Complaint:     Shortness of air    History Of Presenting Illness:      73-year-old lady new to the area after moving from [place] to [place] and then now to [place] history of COPD with history of exacerbations.  Reports was in the hospital 4 months ago with flu and pneumonia.  Also reports was treated outpatient for slight pneumonia with Levaquin and Decadron 2 months ago.  Reports chronic lower extremity edema and a heart murmur but reports she has never had an echo.  Reports that her breathing had actually been doing better up until about 3 days ago.  Also her chronic cough had improved.  However over that period of time its worse and is well.  Consider dry nonproductive cough.  No fevers reported.  In the emergency department it was reported that the patient had required BiPAP and by the time of my exam she was on a nonrebreather mask.  She elects to be full code.  Declines advanced directives.    Pertinent findings: pH 7.327, PCO2 67.3, bicarb 29.1, alkaline phosphatase 147, CRP 6.07, COVID and flu negative, chest x-ray shows a bilateral predominantly basilar interstitial haziness with bibasilar effusions left greater than right without focal consolidation, CT of the chest shows moderate to severe emphysema bilateral effusions, bilateral groundglass opacities, multiple reticulonodular which may be chronic or related to acute disease process would recommend repeat CT to follow-up on this in 4 weeks.    ED Medications:    Medications   sodium chloride 0.9 % flush 10 mL (has no administration in time range)   methylPREDNISolone sodium succinate (SOLU-Medrol) injection 125 mg (125 mg Intravenous Given 3/21/23 1719)   magnesium sulfate 2g/50 mL (PREMIX) infusion (0 g Intravenous Stopped 3/21/23 1820)   ipratropium-albuterol (DUO-NEB) nebulizer solution 3 mL (3 mL  Nebulization Given 3/21/23 1716)   iopamidol (ISOVUE-300) 61 % injection 100 mL (100 mL Intravenous Given 3/21/23 1926)   furosemide (LASIX) injection 40 mg (40 mg Intravenous Given 3/21/23 2051)       Edited by: Bird Fu DO at 3/21/2023 2226     Review Of Systems:    All systems were reviewed and negative except as mentioned in history of presenting illness, assessment and plan.    Past Medical History: Patient  has a past medical history of COPD (chronic obstructive pulmonary disease) (HCC) and Hypertension.    Past Surgical History: Patient  has a past surgical history that includes Vena cava filter placement and Breast biopsy.    Social History: Patient  reports that she has quit smoking. Her smoking use included cigarettes. She has a 25.00 pack-year smoking history. She does not have any smokeless tobacco history on file. She reports current alcohol use. She reports that she does not use drugs.    Family History:  Patient's family history has been reviewed and found to be noncontributory.     Allergies:      Codeine, Sulfa antibiotics, and Tetracyclines & related    Home Medications:    Prior to Admission Medications     Prescriptions Last Dose Informant Patient Reported? Taking?    amLODIPine (NORVASC) 2.5 MG tablet 3/21/2023  Yes Yes    Take 1 tablet by mouth Every Night.    ARIPiprazole (ABILIFY) 2 MG tablet 3/21/2023  Yes Yes    Take 1 tablet by mouth Daily.    escitalopram (LEXAPRO) 20 MG tablet 3/20/2023  Yes Yes    Take 1 tablet by mouth Daily.    lisinopril (PRINIVIL,ZESTRIL) 10 MG tablet   Yes No    Take 1 tablet by mouth Daily.            Vital Signs:  Temp:  [98.1 °F (36.7 °C)-98.3 °F (36.8 °C)] 98.1 °F (36.7 °C)  Heart Rate:  [] 104  Resp:  [22-34] 22  BP: (116-167)/() 150/69        03/21/23  1708 03/21/23 2223   Weight: 90.7 kg (200 lb) 119 kg (262 lb 5.6 oz)     Body mass index is 46.47 kg/m².    Physical Exam:     Most recent vital Signs: /69 (BP Location: Left arm,  "Patient Position: Sitting)   Pulse 104   Temp 98.1 °F (36.7 °C) (Oral)   Resp 22   Ht 160 cm (63\")   Wt 119 kg (262 lb 5.6 oz)   SpO2 94%   BMI 46.47 kg/m²     Constitutional: Awake, alert  Eyes: PERRLA, sclerae anicteric, no conjunctival injection  HENT: NCAT, mucous membranes moist  Neck: Supple, no thyromegaly, no lymphadenopathy, trachea midline  Respiratory: Diminished breath sounds bilaterally with crackles and expiratory wheeze bilaterally, nonlabored respirations   Cardiovascular: RRR, no murmurs, rubs, or gallops, palpable pedal pulses bilaterally  Gastrointestinal: Positive bowel sounds, soft, nontender, nondistended  Musculoskeletal: 2+ bilateral ankle edema, no clubbing or cyanosis to extremities  Psychiatric: Appropriate affect, cooperative  Neurologic: Oriented x 3, speech clear  Skin: No rashes  Edited by: Bird Fu DO at 3/21/2023 2226      Laboratory data:    I have reviewed the labs done in the emergency room.    Results from last 7 days   Lab Units 03/21/23  1716   SODIUM mmol/L 137   POTASSIUM mmol/L 4.9   CHLORIDE mmol/L 97*   CO2 mmol/L 29.1*   BUN mg/dL 7*   CREATININE mg/dL 0.69   CALCIUM mg/dL 9.4   BILIRUBIN mg/dL 0.4   ALK PHOS U/L 147*   ALT (SGPT) U/L 23   AST (SGOT) U/L 25   GLUCOSE mg/dL 116*     Results from last 7 days   Lab Units 03/21/23  1716   WBC 10*3/mm3 9.49   HEMOGLOBIN g/dL 10.8*   HEMATOCRIT % 36.2   PLATELETS 10*3/mm3 301         Results from last 7 days   Lab Units 03/21/23  1942 03/21/23  1716   HSTROP T ng/L <6 <6  <6     Results from last 7 days   Lab Units 03/21/23  1716   PROBNP pg/mL 258.9             Results from last 7 days   Lab Units 03/21/23  1723   PH, ARTERIAL pH units 7.327*   PO2 ART mm Hg 76.6   PCO2, ARTERIAL mm Hg 67.3*   HCO3 ART mmol/L 35.2*           Invalid input(s): USDES,  BLOODU, NITRITITE, BACT, EP    Pain Management Panel    There is no flowsheet data to display.         EKG:      Sinus rhythm without acute ischemic changes " noted    Radiology:    CT Angiogram Chest Pulmonary Embolism    Result Date: 3/21/2023  FINAL REPORT TECHNIQUE: Thin section axial images were obtained through the chest and during the arterial phase of IV contrast administration. Coronal 3-D MIP reconstructed images were also provided. CLINICAL HISTORY: Pulmonary embolism (PE) suspected, positive D-dimer  soa FINDINGS: The pulmonary arteries are well-opacified and there is no filling defect to indicate pulmonary embolism. The thoracic aorta is normal.  There are small bilateral pleural effusions and interstitial pulmonary edema.  There is mild mediastinal adenopathy also likely due to edema.  There is no significant osseous abnormality.     No pulmonary embolism.  Interstitial pulmonary edema with small pleural effusions. Authenticated and Electronically Signed by Semaj Marshall M.D. on 03/21/2023 08:54:31 PM      Assessment/Plan:      Acute on chronic respiratory failure with hypoxia and hypercapnia (HCC)    COPD with acute exacerbation (HCC)    Acute congestive heart failure (HCC)  --Detailed Medical Reasoning: Need 2 of 3 for Acute respiratory failure: pO2 less than 60 mm Hg (hypoxemia) or O2 sat <90% (yes). pCO2 greater than 50 mm Hg (hypercapnia) with pH less than 7.35 (yes). Signs and symptoms of acute respiratory distress (RR greater than 20 or less than 10, wheezing, nasal flaring, accessory muscle use): (Yes wheezing tachypnea). --(DENIS Jones, The [facility], 2019)    --Plan: We will give steroids nebulizer treatments diuretics, check an echo.    Disposition: Anticipate discharge to home in 2 to 3 days    Prophylaxis: Lovenox    Edited by: Bird Fu, DO at 3/21/2023 2231           Risk Assessment: High  DVT Prophylaxis: Lovenox  Code Status:   Code Status and Medical Interventions:   Ordered at: 03/21/23 2232     Code Status (Patient has no pulse and is not breathing):    CPR (Attempt to Resuscitate)     Medical Interventions (Patient has pulse or is  breathing):    Full Support      Diet:   Dietary Orders (From admission, onward)     Start     Ordered    03/21/23 2229  Diet: Regular/House Diet; Texture: Regular Texture (IDDSI 7); Fluid Consistency: Thin (IDDSI 0)  Diet Effective Now        References:    Diet Order [place]   Question Answer Comment   Diets: Regular/House Diet    Texture: Regular Texture (IDDSI 7)    Fluid Consistency: Thin (IDDSI 0)        03/21/23 2232                 Advance Care Planning   ACP discussion was held with the patient during this visit. Patient does not have an advance directive, declines further assistance.          Bird Fu DO  03/21/23  22:34 EDT    Dictated utilizing Dragon dictation.      Electronically signed by Bird Fu DO at 03/21/23 2236          Emergency Department Notes      Akash Rocha DO at 03/21/23 1757      Procedure Orders    1. Critical Care [[id number]] ordered by Akash Rocha DO               Subjective  History of Present Illness:    Patient is a 73-year-old female with history of COPD and hypertension who presents today with shortness of breath.  Patient states that over the last 3 days she has had increasing shortness of breath and dyspnea with exertion.  States that she is recently moved to the area and had been moving into her new home.  She states that today she was unable to get up out of chair due to dyspnea and she presented to our emergency department for evaluation.  Initial pulse ox sat of 56% on presentation.  She denies any chest pain at this time.  Nonproductive cough.  Denies any preceding fever or chills.  No abdominal pain, nausea, vomiting, diarrhea.  Denies any urinary symptoms.  No abnormal vaginal bleeding or discharge.  Denies any unilateral leg swelling or leg pain.  Patient does have significant bilateral pedal edema which she says is baseline for her.  She denies history of heart failure.      Nurses Notes reviewed and agree, including vitals, allergies,  "social history and prior medical history.     REVIEW OF SYSTEMS: All systems reviewed and not pertinent unless noted.  Review of Systems   Constitutional: Positive for activity change and fatigue. Negative for appetite change, chills and fever.   HENT: Negative for rhinorrhea, sinus pressure and sinus pain.    Eyes: Negative for discharge and itching.   Respiratory: Positive for cough and shortness of breath. Negative for wheezing.    Cardiovascular: Negative for chest pain and leg swelling.   Gastrointestinal: Negative for abdominal distention, abdominal pain, nausea and vomiting.   Endocrine: Negative for cold intolerance and heat intolerance.   Genitourinary: Negative for decreased urine volume, difficulty urinating, flank pain, frequency, urgency, vaginal bleeding, vaginal discharge and vaginal pain.   Musculoskeletal: Negative for gait problem, neck pain and neck stiffness.   Skin: Negative for color change.   Allergic/Immunologic: Negative for environmental allergies.   Neurological: Negative for seizures, syncope, facial asymmetry and speech difficulty.   Psychiatric/Behavioral: Negative for self-injury and suicidal ideas.       Past Medical History:   Diagnosis Date   • COPD (chronic obstructive pulmonary disease) (HCC)    • Hypertension        Allergies:    Codeine, Sulfa antibiotics, and Tetracyclines & related      Past Surgical History:   Procedure Laterality Date   • BREAST BIOPSY     • VENA CAVA FILTER PLACEMENT           Social History     Socioeconomic History   • Marital status: Single   Tobacco Use   • Smoking status: Former     Packs/day: 1.00     Years: 25.00     Pack years: 25.00     Types: Cigarettes   Substance and Sexual Activity   • Alcohol use: Yes     Comment: occassional   • Drug use: Never         History reviewed. No pertinent family history.    Objective  Physical Exam:  /66   Pulse 80   Temp 98.3 °F (36.8 °C) (Oral)   Resp 22   Ht 160 cm (63\")   Wt 90.7 kg (200 lb)   SpO2 " 96%   BMI 35.43 kg/m²      Physical Exam  Constitutional:       General: She is not in acute distress.     Appearance: Normal appearance. She is obese. She is not ill-appearing.   HENT:      Head: Normocephalic and atraumatic.      Nose: Nose normal. No congestion or rhinorrhea.      Mouth/Throat:      Mouth: Mucous membranes are dry.      Pharynx: Oropharynx is clear. No oropharyngeal exudate or posterior oropharyngeal erythema.   Eyes:      Extraocular Movements: Extraocular movements intact.      Conjunctiva/sclera: Conjunctivae normal.      Pupils: Pupils are equal, round, and reactive to light.   Cardiovascular:      Rate and Rhythm: Normal rate and regular rhythm.      Pulses: Normal pulses.      Heart sounds: Normal heart sounds.   Pulmonary:      Effort: Pulmonary effort is normal. Tachypnea present. No respiratory distress.      Breath sounds: Examination of the right-upper field reveals wheezing. Examination of the left-upper field reveals wheezing. Examination of the right-middle field reveals decreased breath sounds. Examination of the left-middle field reveals decreased breath sounds. Examination of the right-lower field reveals decreased breath sounds. Examination of the left-lower field reveals decreased breath sounds. Decreased breath sounds and wheezing present.   Abdominal:      General: Abdomen is flat. Bowel sounds are normal. There is no distension.      Palpations: Abdomen is soft.      Tenderness: There is no abdominal tenderness.   Musculoskeletal:         General: No swelling or tenderness. Normal range of motion.      Cervical back: Normal range of motion and neck supple.   Skin:     General: Skin is warm and dry.      Capillary Refill: Capillary refill takes less than 2 seconds.   Neurological:      General: No focal deficit present.      Mental Status: She is alert and oriented to person, place, and time. Mental status is at baseline.      Cranial Nerves: No cranial nerve deficit.       Sensory: No sensory deficit.      Motor: No weakness.      Coordination: Coordination normal.   Psychiatric:         Mood and Affect: Mood normal.         Behavior: Behavior normal.         Thought Content: Thought content normal.         Judgment: Judgment normal.         Critical Care  Performed by: Akash Rocha DO  Authorized by: Akash Rocha DO     Critical care provider statement:     Critical care time (minutes):  36    Critical care time was exclusive of:  Separately billable procedures and treating other patients    Critical care was necessary to treat or prevent imminent or life-threatening deterioration of the following conditions:  Respiratory failure    Critical care was time spent personally by me on the following activities:  Development of treatment plan with patient or surrogate, evaluation of patient's response to treatment, examination of patient, obtaining history from patient or surrogate, ordering and performing treatments and interventions, ordering and review of laboratory studies, ordering and review of radiographic studies, pulse oximetry, re-evaluation of patient's condition and review of old charts    I assumed direction of critical care for this patient from another provider in my specialty: yes      Care discussed with: admitting provider          ED Course:    ED Course as of 03/21/23 2107   Tue Mar 21, 2023   1731 EKG interpreted by me, normal sinus rhythm with no concerning ST changes noted, rate of 79 [JE]      ED Course User Index  [JE] Dustin Dunham MD       Lab Results (last 24 hours)     Procedure Component Value Units Date/Time    CBC & Differential [[id number]]  (Abnormal) Collected: 03/21/23 1716    Specimen: Blood Updated: 03/21/23 1743    Narrative:      The following orders were created for panel order CBC & Differential.  Procedure                               Abnormality         Status                     ---------                               -----------          ------                     CBC Auto Differential[[id number]]        Abnormal            Final result               Scan Slide[[id number]]                                       Final result                 Please view results for these tests on the individual orders.    Comprehensive Metabolic Panel [[id number]]  (Abnormal) Collected: 03/21/23 1716    Specimen: Blood Updated: 03/21/23 1740     Glucose 116 mg/dL      BUN 7 mg/dL      Creatinine 0.69 mg/dL      Sodium 137 mmol/L      Potassium 4.9 mmol/L      Comment: Slight hemolysis detected by analyzer. Results may be affected.        Chloride 97 mmol/L      CO2 29.1 mmol/L      Calcium 9.4 mg/dL      Total Protein 7.6 g/dL      Albumin 4.1 g/dL      ALT (SGPT) 23 U/L      AST (SGOT) 25 U/L      Alkaline Phosphatase 147 U/L      Total Bilirubin 0.4 mg/dL      Globulin 3.5 gm/dL      A/G Ratio 1.2 g/dL      BUN/Creatinine Ratio 10.1     Anion Gap 10.9 mmol/L      eGFR 92.3 mL/min/1.73     Narrative:      GFR Normal >60  Chronic Kidney Disease <60  Kidney Failure <15    The GFR formula is only valid for adults with stable renal function between ages 18 and 70.    BNP [[id number]]  (Normal) Collected: 03/21/23 1716    Specimen: Blood Updated: 03/21/23 1743     proBNP 258.9 pg/mL     Narrative:      Among patients with dyspnea, NT-proBNP is highly sensitive for the detection of acute congestive heart failure. In addition NT-proBNP of <300 pg/ml effectively rules out acute congestive heart failure with 99% negative predictive value.    Results may be falsely decreased if patient taking Biotin.      D-dimer, Quantitative [[id number]]  (Abnormal) Collected: 03/21/23 1716    Specimen: Blood Updated: 03/21/23 1807     D-Dimer, Quantitative 1.41 MCGFEU/mL     Narrative:      According to the assay 's published package insert, a normal (<0.50 MCGFEU/mL) D-dimer result in conjunction with a non-high clinical probability assessment, excludes deep vein thrombosis  "(DVT) and pulmonary embolism (PE) with high sensitivity.    D-dimer values increase with age and this can make VTE exclusion of an older population difficult. To address this, the American College of [employer], based on best available evidence and recent guidelines, recommends that clinicians use age-adjusted D-dimer thresholds in patients greater than 50 years of age with: a) a low probability of PE who do not meet all Pulmonary Embolism Rule Out Criteria, or b) in those with intermediate probability of PE.   The formula for an age-adjusted D-dimer cut-off is \"age/100\".  For example, a 60 year old patient would have an age-adjusted cut-off of 0.60 MCGFEU/mL and an 80 year old 0.80 MCGFEU/mL.    High Sensitivity Troponin T [[id number]]  (Normal) Collected: 03/21/23 1716    Specimen: Blood Updated: 03/21/23 1743     HS Troponin T <6 ng/L     Narrative:      High Sensitive Troponin T Reference Range:  <10.0 ng/L- Negative Female for AMI  <15.0 ng/L- Negative Male for AMI  >=10 - Abnormal Female indicating possible myocardial injury.  >=15 - Abnormal Male indicating possible myocardial injury.   Clinicians would have to utilize clinical acumen, EKG, Troponin, and serial changes to determine if it is an Acute Myocardial Infarction or myocardial injury due to an underlying chronic condition.         C-reactive Protein [[id number]]  (Abnormal) Collected: 03/21/23 1716    Specimen: Blood Updated: 03/21/23 1740     C-Reactive Protein 6.07 mg/dL     Procalcitonin [[id number]]  (Normal) Collected: 03/21/23 1716    Specimen: Blood Updated: 03/21/23 1749     Procalcitonin 0.06 ng/mL     Narrative:      As a Marker for Sepsis (Non-Neonates):    1. <0.5 ng/mL represents a low risk of severe sepsis and/or septic shock.  2. >2 ng/mL represents a high risk of severe sepsis and/or septic shock.    As a Marker for Lower Respiratory Tract Infections that require antibiotic therapy:    PCT on Admission    Antibiotic Therapy       6-12 Hrs " "later    >0.5                Strongly Recommended  >0.25 - <0.5        Recommended   0.1 - 0.25          Discouraged              Remeasure/reassess PCT  <0.1                Strongly Discouraged     Remeasure/reassess PCT    As 28 day mortality risk marker: \"Change in Procalcitonin Result\" (>80% or <=80%) if Day 0 (or Day 1) and Day 4 values are available. Refer to http://www.[facility]-pct-calculator.com    Change in PCT <=80%  A decrease of PCT levels below or equal to 80% defines a positive change in PCT test result representing a higher risk for 28-day all-cause mortality of patients diagnosed with severe sepsis for septic shock.    Change in PCT >80%  A decrease of PCT levels of more than 80% defines a negative change in PCT result representing a lower risk for 28-day all-cause mortality of patients diagnosed with severe sepsis or septic shock.       Magnesium [[id number]]  (Normal) Collected: 03/21/23 1716    Specimen: Blood Updated: 03/21/23 1740     Magnesium 2.4 mg/dL     Phosphorus [[id number]]  (Normal) Collected: 03/21/23 1716    Specimen: Blood Updated: 03/21/23 1740     Phosphorus 3.0 mg/dL     CBC Auto Differential [[id number]]  (Abnormal) Collected: 03/21/23 1716    Specimen: Blood Updated: 03/21/23 1743     WBC 9.49 10*3/mm3      RBC 4.04 10*6/mm3      Hemoglobin 10.8 g/dL      Hematocrit 36.2 %      MCV 89.6 fL      MCH 26.7 pg      MCHC 29.8 g/dL      RDW 15.1 %      RDW-SD 49.5 fl      MPV 9.7 fL      Platelets 301 10*3/mm3      Neutrophil % 79.7 %      Lymphocyte % 11.0 %      Monocyte % 6.4 %      Eosinophil % 1.8 %      Basophil % 0.5 %      Immature Grans % 0.6 %      Neutrophils, Absolute 7.56 10*3/mm3      Lymphocytes, Absolute 1.04 10*3/mm3      Monocytes, Absolute 0.61 10*3/mm3      Eosinophils, Absolute 0.17 10*3/mm3      Basophils, Absolute 0.05 10*3/mm3      Immature Grans, Absolute 0.06 10*3/mm3      nRBC 0.0 /100 WBC     Single High Sensitivity Troponin T [[id number]]  (Normal) Collected: " 03/21/23 1716    Specimen: Blood Updated: 03/21/23 1743     HS Troponin T <6 ng/L     Narrative:      High Sensitive Troponin T Reference Range:  <10.0 ng/L- Negative Female for AMI  <15.0 ng/L- Negative Male for AMI  >=10 - Abnormal Female indicating possible myocardial injury.  >=15 - Abnormal Male indicating possible myocardial injury.   Clinicians would have to utilize clinical acumen, EKG, Troponin, and serial changes to determine if it is an Acute Myocardial Infarction or myocardial injury due to an underlying chronic condition.         Scan Slide [[id number]] Collected: 03/21/23 1716    Specimen: Blood Updated: 03/21/23 1743     Hypochromia Slight/1+     WBC Morphology Normal     Platelet Estimate Adequate    COVID-19 and FLU A/B PCR - Swab, Nasopharynx [[id number]]  (Normal) Collected: 03/21/23 1717    Specimen: Swab from Nasopharynx Updated: 03/21/23 1744     COVID19 Not Detected     Influenza A PCR Not Detected     Influenza B PCR Not Detected    Narrative:      Fact sheet for providers: https://www.fda.gov/media/731610/download    Fact sheet for patients: https://www.fda.gov/media/260427/download    Test performed by PCR.    Blood Gas, Arterial With Co-Ox [[id number]]  (Abnormal) Collected: 03/21/23 1723    Specimen: Arterial Blood Updated: 03/21/23 1723     Site Right Radial     Reddy's Test N/A     pH, Arterial 7.327 pH units      Comment: 84 Value below reference range        pCO2, Arterial 67.3 mm Hg      Comment: 86 Value above critical limit        pO2, Arterial 76.6 mm Hg      Comment: 84 Value below reference range        HCO3, Arterial 35.2 mmol/L      Comment: 83 Value above reference range        Base Excess, Arterial 7.5 mmol/L      Comment: 83 Value above reference range        O2 Saturation, Arterial 95.4 %      Hematocrit, Blood Gas 32.0 %      Comment: 84 Value below reference range        Oxyhemoglobin 93.5 %      Comment: 84 Value below reference range        Methemoglobin 0.50 %       Carboxyhemoglobin 1.4 %      A-a DO2 --     Comment: UNABLE TO CALCULATE        Barometric Pressure for Blood Gas 739 mmHg      Modality Nasal Cannula     Flow Rate 6.0 lpm      Ventilator Mode NA     Note --     Notified Who ROBERT HERNANDEZ MD     Notified By [id number]     Notified Time 03/21/2023 16:36     Collected by  RRT     Comment: Meter: [id number]     :  [id number]        pH, Temp Corrected --     pCO2, Temperature Corrected --     pO2, Temperature Corrected --    High Sensitivity Troponin T 2Hr [[id number]] Collected: 03/21/23 1942    Specimen: Blood Updated: 03/21/23 2011     HS Troponin T <6 ng/L      Troponin T Delta --     Comment: Unable to calculate.       Narrative:      High Sensitive Troponin T Reference Range:  <10.0 ng/L- Negative Female for AMI  <15.0 ng/L- Negative Male for AMI  >=10 - Abnormal Female indicating possible myocardial injury.  >=15 - Abnormal Male indicating possible myocardial injury.   Clinicians would have to utilize clinical acumen, EKG, Troponin, and serial changes to determine if it is an Acute Myocardial Infarction or myocardial injury due to an underlying chronic condition.                CT Angiogram Chest Pulmonary Embolism    Result Date: 3/21/2023  FINAL REPORT TECHNIQUE: Thin section axial images were obtained through the chest and during the arterial phase of IV contrast administration. Coronal 3-D MIP reconstructed images were also provided. CLINICAL HISTORY: Pulmonary embolism (PE) suspected, positive D-dimer  soa FINDINGS: The pulmonary arteries are well-opacified and there is no filling defect to indicate pulmonary embolism. The thoracic aorta is normal.  There are small bilateral pleural effusions and interstitial pulmonary edema.  There is mild mediastinal adenopathy also likely due to edema.  There is no significant osseous abnormality.     Impression: No pulmonary embolism.  Interstitial pulmonary edema with small pleural effusions. Authenticated and  Electronically Signed by Semaj Marshall M.D. on 03/21/2023 08:54:31 PM         MDM    Initial impression of presenting illness: Shortness of breath    DDX: includes but is not limited to: PE, bacterial pneumonia, viral URI, COPD exacerbation, heart failure exacerbation    Patient arrives guarded with vitals interpreted by myself.     Pertinent features from physical exam: Nonpitting pedal edema bilateral, wheezing in the upper lobes with decreased sounds in the lower lobes, tachypneic on exam.    Initial diagnostic plan: CBC, CMP, troponin, ECG, chest x-ray, CRP, Pro-Johnathon, mag, Phos    Results from initial plan were reviewed and interpreted by me revealing elevated D-dimer, CT PE ordered  21:07 EDT  I received care from Dr. Dunham for follow-up of CT.  CT reveals interstitial pulmonary edema with small pleural effusions.  No PE.  Added Lasix.  Patient still on BiPAP due to oxygen desaturations with light activity.  Diagnostic information from other sources: Old record review    Interventions / Re-evaluation: Given DuoNeb, magnesium, Solu-Medrol with improvement of symptoms    Results/clinical rationale were discussed with patient and family member at the bedside    Consultations/Discussion of results with other physicians: Dr. Fu    Disposition plan: Admission for further care  -----    Final diagnoses:   Acute respiratory failure with hypoxia (HCC)        Akash Rocha DO  03/21/23 2107      Electronically signed by Akash Rocha DO at 03/21/23 2107     Analy Kuhn RN at 03/21/23 1947        Placed purewick on patient at this time.     Electronically signed by Analy Kuhn RN at 03/21/23 1947     Alcira Kaplan at 03/21/23 2104        Patient will be going to  320. Analy DUMONT notified.     Electronically signed by Alcira Kaplan at 03/21/23 2104     Analy Kuhn RN at 03/21/23 2133        Gave report to JULIA Oliveira at this time.     Electronically signed by Analy Kuhn RN at 03/21/23 2133       Vital Signs (last day)      Date/Time Temp Temp src Pulse Resp BP Patient Position SpO2    03/22/23 0314 97.5 (36.4) Oral 77 22 -- -- 90    03/22/23 0307 -- -- 83 -- 159/74 Sitting 85    03/21/23 2223 98.1 (36.7) Oral 104 22 150/69 Sitting 94    03/21/23 2030 -- -- 80 -- 155/66 -- 96    03/21/23 19:45:49 -- -- -- -- -- -- 98    03/21/23 19:42:28 98.3 (36.8) Oral 83 22 116/76 Sitting --    03/21/23 1843 -- -- 77 -- -- -- 98    03/21/23 1819 98.3 (36.8) Oral -- -- -- -- --    03/21/23 1813 -- -- 90 -- -- -- 84    03/21/23 1743 -- -- 77 -- -- -- 96    03/21/23 17:29:03 -- -- -- 28 -- -- --    03/21/23 1713 -- -- 84 -- -- -- 99    03/21/23 1708 -- -- 86 34 167/121 Sitting 98            Current Facility-Administered Medications   Medication Dose Route Frequency Provider Last Rate Last Admin   • acetaminophen (TYLENOL) tablet 650 mg  650 mg Oral Q4H PRN Bird Fu DO       • amLODIPine (NORVASC) tablet 2.5 mg  2.5 mg Oral Nightly Bird Fu DO   2.5 mg at 03/21/23 2308   • ARIPiprazole (ABILIFY) tablet 2 mg  2 mg Oral Daily Bird Fu DO       • sennosides-docusate (PERICOLACE) 8.6-50 MG per tablet 2 tablet  2 tablet Oral BID Bird Fu DO        And   • polyethylene glycol (MIRALAX) packet 17 g  17 g Oral Daily PRN Bird Fu DO        And   • bisacodyl (DULCOLAX) EC tablet 5 mg  5 mg Oral Daily PRN Bird Fu DO        And   • bisacodyl (DULCOLAX) suppository 10 mg  10 mg Rectal Daily PRN Bird Fu, DO       • budesonide-formoterol (SYMBICORT) 160-4.5 MCG/ACT inhaler 2 puff  2 puff Inhalation BID - RT Bird Fu DO   2 puff at 03/22/23 0052   • calcium carbonate (TUMS) chewable tablet 500 mg (200 mg elemental)  2 tablet Oral BID PRN Bird Fu, DO       • Calcium Replacement - Follow Nurse / BPA Driven Protocol   Does not apply PRN Bird Fu DO       • Enoxaparin Sodium (LOVENOX) syringe 40 mg  40 mg Subcutaneous Q12H Bird Fu,    40  mg at 03/21/23 2308   • escitalopram (LEXAPRO) tablet 20 mg  20 mg Oral Daily Bird Fu, DO       • furosemide (LASIX) injection 40 mg  40 mg Intravenous BID Bird Fu DO       • guaiFENesin-dextromethorphan (ROBITUSSIN DM) 100-10 MG/5ML syrup 10 mL  10 mL Oral Q6H PRN Bird Fu, DO       • ibuprofen (ADVIL,MOTRIN) tablet 800 mg  800 mg Oral BID PRN Bird Fu DO   800 mg at 03/21/23 2308   • Influenza Vac High-Dose Quad (FLUZONE HIGH DOSE) injection 0.7 mL  0.7 mL Intramuscular During Hospitalization Bird Fu DO       • ipratropium-albuterol (DUO-NEB) nebulizer solution 3 mL  3 mL Nebulization Q4H PRN Bird Fu, DO       • lisinopril (PRINIVIL,ZESTRIL) tablet 10 mg  10 mg Oral Nightly Bird Fu DO   10 mg at 03/21/23 2308   • magnesium oxide (MAG-OX) tablet 400 mg  400 mg Oral BID Bird Fu, DO   400 mg at 03/22/23 0004   • Magnesium Standard Dose Replacement - Follow Nurse / BPA Driven Protocol   Does not apply PRN Bird Fu DO       • melatonin tablet 5 mg  5 mg Oral Nightly Bird Fu DO   5 mg at 03/21/23 2308   • ondansetron (ZOFRAN) tablet 4 mg  4 mg Oral Q6H PRN Bird Fu DO        Or   • ondansetron (ZOFRAN) injection 4 mg  4 mg Intravenous Q6H PRN Bird Fu DO       • pantoprazole (PROTONIX) EC tablet 40 mg  40 mg Oral Q AM Bird Fu DO   40 mg at 03/22/23 0602   • Phosphorus Replacement - Follow Nurse / BPA Driven Protocol   Does not apply PRN Bird Fu DO       • Potassium Replacement - Follow Nurse / BPA Driven Protocol   Does not apply PRN Bird Fu DO       • pramipexole (MIRAPEX) tablet 1 mg  1 mg Oral BID PRN Bird Fu, DO   1 mg at 03/21/23 2308   • predniSONE (DELTASONE) tablet 40 mg  40 mg Oral Daily With Breakfast Bird Fu DO   40 mg at 03/21/23 2307   • rOPINIRole (REQUIP) tablet 1 mg  1 mg Oral BID PRN Nickie  Bird Toussaint, DO   1 mg at 03/21/23 2308   • sodium chloride 0.9 % flush 10 mL  10 mL Intravenous PRN Bird Fu, DO       • sodium chloride 0.9 % flush 10 mL  10 mL Intravenous Q12H Bird Fu, DO   10 mL at 03/21/23 2309   • sodium chloride 0.9 % flush 10 mL  10 mL Intravenous PRN Bird Fu, DO       • sodium chloride 0.9 % infusion 40 mL  40 mL Intravenous PRN Bird Fu, DO       • tiotropium (SPIRIVA RESPIMAT) 2.5 mcg/act aerosol solution inhaler  2 puff Inhalation Daily - RT Bird Fu, DO           Lab Results (last 24 hours)     Procedure Component Value Units Date/Time    Comprehensive Metabolic Panel [[id number]]  (Abnormal) Collected: 03/22/23 0552    Specimen: Blood Updated: 03/22/23 0627     Glucose 237 mg/dL      Comment: Glucose >180, Hemoglobin A1C recommended.        BUN 12 mg/dL      Creatinine 0.84 mg/dL      Sodium 137 mmol/L      Potassium 5.2 mmol/L      Chloride 94 mmol/L      CO2 33.2 mmol/L      Calcium 9.1 mg/dL      Total Protein 7.3 g/dL      Albumin 4.0 g/dL      ALT (SGPT) 20 U/L      AST (SGOT) 20 U/L      Alkaline Phosphatase 142 U/L      Total Bilirubin 0.3 mg/dL      Globulin 3.3 gm/dL      A/G Ratio 1.2 g/dL      BUN/Creatinine Ratio 14.3     Anion Gap 9.8 mmol/L      eGFR 73.9 mL/min/1.73     Narrative:      GFR Normal >60  Chronic Kidney Disease <60  Kidney Failure <15    The GFR formula is only valid for adults with stable renal function between ages 18 and 70.    CBC (No Diff) [[id number]]  (Abnormal) Collected: 03/22/23 0552    Specimen: Blood Updated: 03/22/23 0612     WBC 6.82 10*3/mm3      RBC 3.76 10*6/mm3      Hemoglobin 9.9 g/dL      Hematocrit 34.2 %      MCV 91.0 fL      MCH 26.3 pg      MCHC 28.9 g/dL      RDW 14.9 %      RDW-SD 50.2 fl      MPV 9.4 fL      Platelets 299 10*3/mm3     High Sensitivity Troponin T 2Hr [[id number]] Collected: 03/21/23 1942    Specimen: Blood Updated: 03/21/23 2011     HS Troponin T <6 ng/L       Troponin T Delta --     Comment: Unable to calculate.       Narrative:      High Sensitive Troponin T Reference Range:  <10.0 ng/L- Negative Female for AMI  <15.0 ng/L- Negative Male for AMI  >=10 - Abnormal Female indicating possible myocardial injury.  >=15 - Abnormal Male indicating possible myocardial injury.   Clinicians would have to utilize clinical acumen, EKG, Troponin, and serial changes to determine if it is an Acute Myocardial Infarction or myocardial injury due to an underlying chronic condition.         [place] Draw [[id number]] Collected: 03/21/23 1716    Specimen: Blood Updated: 03/21/23 1831    Narrative:      The following orders were created for panel order [place] Draw.  Procedure                               Abnormality         Status                     ---------                               -----------         ------                     Green Top (Gel)[[id number]]                                  Final result               Lavender Top[[id number]]                                     Final result               Gold Top - SST[[id number]]                                   Final result               Light Blue Top[[id number]]                                   Final result                 Please view results for these tests on the individual orders.    Green Top (Gel) [[id number]] Collected: 03/21/23 1716    Specimen: Blood Updated: 03/21/23 1831     Extra Tube Hold for add-ons.     Comment: Auto resulted.       Gold Top - SST [[id number]] Collected: 03/21/23 1716    Specimen: Blood Updated: 03/21/23 1831     Extra Tube Hold for add-ons.     Comment: Auto resulted.       Lavender Top [[id number]] Collected: 03/21/23 1716    Specimen: Blood Updated: 03/21/23 1831     Extra Tube hold for add-on     Comment: Auto resulted       D-dimer, Quantitative [[id number]]  (Abnormal) Collected: 03/21/23 1716    Specimen: Blood Updated: 03/21/23 1807     D-Dimer, Quantitative 1.41 MCGFEU/mL     Narrative:       "According to the assay 's published package insert, a normal (<0.50 MCGFEU/mL) D-dimer result in conjunction with a non-high clinical probability assessment, excludes deep vein thrombosis (DVT) and pulmonary embolism (PE) with high sensitivity.    D-dimer values increase with age and this can make VTE exclusion of an older population difficult. To address this, the [employer], based on best available evidence and recent guidelines, recommends that clinicians use age-adjusted D-dimer thresholds in patients greater than 50 years of age with: a) a low probability of PE who do not meet all Pulmonary Embolism Rule Out Criteria, or b) in those with intermediate probability of PE.   The formula for an age-adjusted D-dimer cut-off is \"age/100\".  For example, a 60 year old patient would have an age-adjusted cut-off of 0.60 MCGFEU/mL and an 80 year old 0.80 MCGFEU/mL.    Light Blue Top [[id number]] Collected: 03/21/23 1716    Specimen: Blood Updated: 03/21/23 1800     Extra Tube Hold for add-ons.     Comment: Auto resulted       Procalcitonin [[id number]]  (Normal) Collected: 03/21/23 1716    Specimen: Blood Updated: 03/21/23 1749     Procalcitonin 0.06 ng/mL     Narrative:      As a Marker for Sepsis (Non-Neonates):    1. <0.5 ng/mL represents a low risk of severe sepsis and/or septic shock.  2. >2 ng/mL represents a high risk of severe sepsis and/or septic shock.    As a Marker for Lower Respiratory Tract Infections that require antibiotic therapy:    PCT on Admission    Antibiotic Therapy       6-12 Hrs later    >0.5                Strongly Recommended  >0.25 - <0.5        Recommended   0.1 - 0.25          Discouraged              Remeasure/reassess PCT  <0.1                Strongly Discouraged     Remeasure/reassess PCT    As 28 day mortality risk marker: \"Change in Procalcitonin Result\" (>80% or <=80%) if Day 0 (or Day 1) and Day 4 values are available. Refer to " http://www.[facility]-pct-calculator.com    Change in PCT <=80%  A decrease of PCT levels below or equal to 80% defines a positive change in PCT test result representing a higher risk for 28-day all-cause mortality of patients diagnosed with severe sepsis for septic shock.    Change in PCT >80%  A decrease of PCT levels of more than 80% defines a negative change in PCT result representing a lower risk for 28-day all-cause mortality of patients diagnosed with severe sepsis or septic shock.       COVID-19 and FLU A/B PCR - Swab, Nasopharynx [[id number]]  (Normal) Collected: 03/21/23 1717    Specimen: Swab from Nasopharynx Updated: 03/21/23 1744     COVID19 Not Detected     Influenza A PCR Not Detected     Influenza B PCR Not Detected    Narrative:      Fact sheet for providers: https://www.fda.gov/media/555386/download    Fact sheet for patients: https://www.fda.gov/media/745920/download    Test performed by PCR.    CBC & Differential [[id number]]  (Abnormal) Collected: 03/21/23 1716    Specimen: Blood Updated: 03/21/23 1743    Narrative:      The following orders were created for panel order CBC & Differential.  Procedure                               Abnormality         Status                     ---------                               -----------         ------                     CBC Auto Differential[[id number]]        Abnormal            Final result               Scan Slide[[id number]]                                       Final result                 Please view results for these tests on the individual orders.    CBC Auto Differential [[id number]]  (Abnormal) Collected: 03/21/23 1716    Specimen: Blood Updated: 03/21/23 1743     WBC 9.49 10*3/mm3      RBC 4.04 10*6/mm3      Hemoglobin 10.8 g/dL      Hematocrit 36.2 %      MCV 89.6 fL      MCH 26.7 pg      MCHC 29.8 g/dL      RDW 15.1 %      RDW-SD 49.5 fl      MPV 9.7 fL      Platelets 301 10*3/mm3      Neutrophil % 79.7 %      Lymphocyte % 11.0 %      Monocyte % 6.4  %      Eosinophil % 1.8 %      Basophil % 0.5 %      Immature Grans % 0.6 %      Neutrophils, Absolute 7.56 10*3/mm3      Lymphocytes, Absolute 1.04 10*3/mm3      Monocytes, Absolute 0.61 10*3/mm3      Eosinophils, Absolute 0.17 10*3/mm3      Basophils, Absolute 0.05 10*3/mm3      Immature Grans, Absolute 0.06 10*3/mm3      nRBC 0.0 /100 WBC     Scan Slide [[id number]] Collected: 03/21/23 1716    Specimen: Blood Updated: 03/21/23 1743     Hypochromia Slight/1+     WBC Morphology Normal     Platelet Estimate Adequate    High Sensitivity Troponin T [[id number]]  (Normal) Collected: 03/21/23 1716    Specimen: Blood Updated: 03/21/23 1743     HS Troponin T <6 ng/L     Narrative:      High Sensitive Troponin T Reference Range:  <10.0 ng/L- Negative Female for AMI  <15.0 ng/L- Negative Male for AMI  >=10 - Abnormal Female indicating possible myocardial injury.  >=15 - Abnormal Male indicating possible myocardial injury.   Clinicians would have to utilize clinical acumen, EKG, Troponin, and serial changes to determine if it is an Acute Myocardial Infarction or myocardial injury due to an underlying chronic condition.         Single High Sensitivity Troponin T [[id number]]  (Normal) Collected: 03/21/23 1716    Specimen: Blood Updated: 03/21/23 1743     HS Troponin T <6 ng/L     Narrative:      High Sensitive Troponin T Reference Range:  <10.0 ng/L- Negative Female for AMI  <15.0 ng/L- Negative Male for AMI  >=10 - Abnormal Female indicating possible myocardial injury.  >=15 - Abnormal Male indicating possible myocardial injury.   Clinicians would have to utilize clinical acumen, EKG, Troponin, and serial changes to determine if it is an Acute Myocardial Infarction or myocardial injury due to an underlying chronic condition.         BNP [[id number]]  (Normal) Collected: 03/21/23 1716    Specimen: Blood Updated: 03/21/23 1743     proBNP 258.9 pg/mL     Narrative:      Among patients with dyspnea, NT-proBNP is highly sensitive for  the detection of acute congestive heart failure. In addition NT-proBNP of <300 pg/ml effectively rules out acute congestive heart failure with 99% negative predictive value.    Results may be falsely decreased if patient taking Biotin.      Comprehensive Metabolic Panel [[id number]]  (Abnormal) Collected: 03/21/23 1716    Specimen: Blood Updated: 03/21/23 1740     Glucose 116 mg/dL      BUN 7 mg/dL      Creatinine 0.69 mg/dL      Sodium 137 mmol/L      Potassium 4.9 mmol/L      Comment: Slight hemolysis detected by analyzer. Results may be affected.        Chloride 97 mmol/L      CO2 29.1 mmol/L      Calcium 9.4 mg/dL      Total Protein 7.6 g/dL      Albumin 4.1 g/dL      ALT (SGPT) 23 U/L      AST (SGOT) 25 U/L      Alkaline Phosphatase 147 U/L      Total Bilirubin 0.4 mg/dL      Globulin 3.5 gm/dL      A/G Ratio 1.2 g/dL      BUN/Creatinine Ratio 10.1     Anion Gap 10.9 mmol/L      eGFR 92.3 mL/min/1.73     Narrative:      GFR Normal >60  Chronic Kidney Disease <60  Kidney Failure <15    The GFR formula is only valid for adults with stable renal function between ages 18 and 70.    C-reactive Protein [[id number]]  (Abnormal) Collected: 03/21/23 1716    Specimen: Blood Updated: 03/21/23 1740     C-Reactive Protein 6.07 mg/dL     Magnesium [[id number]]  (Normal) Collected: 03/21/23 1716    Specimen: Blood Updated: 03/21/23 1740     Magnesium 2.4 mg/dL     Phosphorus [[id number]]  (Normal) Collected: 03/21/23 1716    Specimen: Blood Updated: 03/21/23 1740     Phosphorus 3.0 mg/dL     Blood Gas, Arterial With Co-Ox [[id number]]  (Abnormal) Collected: 03/21/23 1723    Specimen: Arterial Blood Updated: 03/21/23 1723     Site Right Radial     Reddy's Test N/A     pH, Arterial 7.327 pH units      Comment: 84 Value below reference range        pCO2, Arterial 67.3 mm Hg      Comment: 86 Value above critical limit        pO2, Arterial 76.6 mm Hg      Comment: 84 Value below reference range        HCO3, Arterial 35.2 mmol/L       Comment: 83 Value above reference range        Base Excess, Arterial 7.5 mmol/L      Comment: 83 Value above reference range        O2 Saturation, Arterial 95.4 %      Hematocrit, Blood Gas 32.0 %      Comment: 84 Value below reference range        Oxyhemoglobin 93.5 %      Comment: 84 Value below reference range        Methemoglobin 0.50 %      Carboxyhemoglobin 1.4 %      A-a DO2 --     Comment: UNABLE TO CALCULATE        Barometric Pressure for Blood Gas 739 mmHg      Modality Nasal Cannula     Flow Rate 6.0 lpm      Ventilator Mode NA     Note --     Notified Who ROBERT HERNANDEZ MD     Notified By [id number]     Notified Time 03/21/2023 16:36     Collected by ELMA RRT     Comment: Meter: [id number]     :  [id number]        pH, Temp Corrected --     pCO2, Temperature Corrected --     pO2, Temperature Corrected --        Imaging Results (Last 24 Hours)     Procedure Component Value Units Date/Time    CT Angiogram Chest Pulmonary Embolism [[id number]] Collected: 03/21/23 2054     Updated: 03/21/23 2055    Narrative:      FINAL REPORT    TECHNIQUE:  Thin section axial images were obtained through the chest and  during the arterial phase of IV contrast administration. Coronal  3-D MIP reconstructed images were also provided.    CLINICAL HISTORY:  Pulmonary embolism (PE) suspected, positive D-dimer  soa    FINDINGS:  The pulmonary arteries are well-opacified and there is no  filling defect to indicate pulmonary embolism. The thoracic  aorta is normal.  There are small bilateral pleural effusions  and interstitial pulmonary edema.  There is mild mediastinal  adenopathy also likely due to edema.  There is no significant  osseous abnormality.      Impression:      No pulmonary embolism.  Interstitial pulmonary edema with small  pleural effusions.    Authenticated and Electronically Signed by Semaj Marshall M.D. on  03/21/2023 08:54:31 PM    XR Chest 1 View [[id number]] Resulted: 03/21/23 1737     Updated: 03/21/23 1805         Physician Progress Notes (last 24 hours)  Notes from 03/21/23 0652 through 03/22/23 0652   No notes of this type exist for this encounter.

## 2023-03-22 NOTE — PLAN OF CARE
Goal Outcome Evaluation:  Plan of Care Reviewed With: patient        Progress: improving  Outcome Evaluation: Pt admitted to telemetry floor from ED. VS noted on 6L HFNC. Pt refused BIPAP despite education on hypercapnia. No acute events noted during shift. Will continue current plan of care.

## 2023-03-22 NOTE — H&P
[facility] HOSPITALIST   HISTORY AND PHYSICAL      Name:  Claudia Stokes   Age:  73 y.o.  Sex:  female  :  1950  MRN:  [id number]   Visit Number:  [id number]  Admission Date:  3/21/2023  Date Of Service:  23  Primary Care Physician:  Provider, No Known    Chief Complaint:     Shortness of air    History Of Presenting Illness:      73-year-old lady new to the area after moving from [place] to [place] and then now to [place] history of COPD with history of exacerbations.  Reports was in the hospital 4 months ago with flu and pneumonia.  Also reports was treated outpatient for slight pneumonia with Levaquin and Decadron 2 months ago.  Reports chronic lower extremity edema and a heart murmur but reports she has never had an echo.  Reports that her breathing had actually been doing better up until about 3 days ago.  Also her chronic cough had improved.  However over that period of time its worse and is well.  Consider dry nonproductive cough.  No fevers reported.  In the emergency department it was reported that the patient had required BiPAP and by the time of my exam she was on a nonrebreather mask.  She elects to be full code.  Declines advanced directives.    Pertinent findings: pH 7.327, PCO2 67.3, bicarb 29.1, alkaline phosphatase 147, CRP 6.07, COVID and flu negative, chest x-ray shows a bilateral predominantly basilar interstitial haziness with bibasilar effusions left greater than right without focal consolidation, CT of the chest shows moderate to severe emphysema bilateral effusions, bilateral groundglass opacities, multiple reticulonodular which may be chronic or related to acute disease process would recommend repeat CT to follow-up on this in 4 weeks.    ED Medications:    Medications   sodium chloride 0.9 % flush 10 mL (has no administration in time range)   methylPREDNISolone sodium succinate (SOLU-Medrol) injection 125 mg (125 mg Intravenous Given 3/21/23 1272)    magnesium sulfate 2g/50 mL (PREMIX) infusion (0 g Intravenous Stopped 3/21/23 1820)   ipratropium-albuterol (DUO-NEB) nebulizer solution 3 mL (3 mL Nebulization Given 3/21/23 1716)   iopamidol (ISOVUE-300) 61 % injection 100 mL (100 mL Intravenous Given 3/21/23 1926)   furosemide (LASIX) injection 40 mg (40 mg Intravenous Given 3/21/23 2051)       Edited by: Bird Fu DO at 3/21/2023 2226     Review Of Systems:    All systems were reviewed and negative except as mentioned in history of presenting illness, assessment and plan.    Past Medical History: Patient  has a past medical history of COPD (chronic obstructive pulmonary disease) (HCC) and Hypertension.    Past Surgical History: Patient  has a past surgical history that includes Vena cava filter placement and Breast biopsy.    Social History: Patient  reports that she has quit smoking. Her smoking use included cigarettes. She has a 25.00 pack-year smoking history. She does not have any smokeless tobacco history on file. She reports current alcohol use. She reports that she does not use drugs.    Family History:  Patient's family history has been reviewed and found to be noncontributory.     Allergies:      Codeine, Sulfa antibiotics, and Tetracyclines & related    Home Medications:    Prior to Admission Medications     Prescriptions Last Dose Informant Patient Reported? Taking?    amLODIPine (NORVASC) 2.5 MG tablet 3/21/2023  Yes Yes    Take 1 tablet by mouth Every Night.    ARIPiprazole (ABILIFY) 2 MG tablet 3/21/2023  Yes Yes    Take 1 tablet by mouth Daily.    escitalopram (LEXAPRO) 20 MG tablet 3/20/2023  Yes Yes    Take 1 tablet by mouth Daily.    lisinopril (PRINIVIL,ZESTRIL) 10 MG tablet   Yes No    Take 1 tablet by mouth Daily.            Vital Signs:  Temp:  [98.1 °F (36.7 °C)-98.3 °F (36.8 °C)] 98.1 °F (36.7 °C)  Heart Rate:  [] 104  Resp:  [22-34] 22  BP: (116-167)/() 150/69        03/21/23  1708 03/21/23 2223   Weight: 90.7  "kg (200 lb) 119 kg (262 lb 5.6 oz)     Body mass index is 46.47 kg/m².    Physical Exam:     Most recent vital Signs: /69 (BP Location: Left arm, Patient Position: Sitting)   Pulse 104   Temp 98.1 °F (36.7 °C) (Oral)   Resp 22   Ht 160 cm (63\")   Wt 119 kg (262 lb 5.6 oz)   SpO2 94%   BMI 46.47 kg/m²     Constitutional: Awake, alert  Eyes: PERRLA, sclerae anicteric, no conjunctival injection  HENT: NCAT, mucous membranes moist  Neck: Supple, no thyromegaly, no lymphadenopathy, trachea midline  Respiratory: Diminished breath sounds bilaterally with crackles and expiratory wheeze bilaterally, nonlabored respirations   Cardiovascular: RRR, no murmurs, rubs, or gallops, palpable pedal pulses bilaterally  Gastrointestinal: Positive bowel sounds, soft, nontender, nondistended  Musculoskeletal: 2+ bilateral ankle edema, no clubbing or cyanosis to extremities  Psychiatric: Appropriate affect, cooperative  Neurologic: Oriented x 3, speech clear  Skin: No rashes  Edited by: Bird Fu DO at 3/21/2023 2226      Laboratory data:    I have reviewed the labs done in the emergency room.    Results from last 7 days   Lab Units 03/21/23  1716   SODIUM mmol/L 137   POTASSIUM mmol/L 4.9   CHLORIDE mmol/L 97*   CO2 mmol/L 29.1*   BUN mg/dL 7*   CREATININE mg/dL 0.69   CALCIUM mg/dL 9.4   BILIRUBIN mg/dL 0.4   ALK PHOS U/L 147*   ALT (SGPT) U/L 23   AST (SGOT) U/L 25   GLUCOSE mg/dL 116*     Results from last 7 days   Lab Units 03/21/23  1716   WBC 10*3/mm3 9.49   HEMOGLOBIN g/dL 10.8*   HEMATOCRIT % 36.2   PLATELETS 10*3/mm3 301         Results from last 7 days   Lab Units 03/21/23  1942 03/21/23  1716   HSTROP T ng/L <6 <6  <6     Results from last 7 days   Lab Units 03/21/23  1716   PROBNP pg/mL 258.9             Results from last 7 days   Lab Units 03/21/23  1723   PH, ARTERIAL pH units 7.327*   PO2 ART mm Hg 76.6   PCO2, ARTERIAL mm Hg 67.3*   HCO3 ART mmol/L 35.2*           Invalid input(s): HANNAH  BLOODU, " NITRITITE, BACT, EP    Pain Management Panel    There is no flowsheet data to display.         EKG:      Sinus rhythm without acute ischemic changes noted    Radiology:    CT Angiogram Chest Pulmonary Embolism    Result Date: 3/21/2023  FINAL REPORT TECHNIQUE: Thin section axial images were obtained through the chest and during the arterial phase of IV contrast administration. Coronal 3-D MIP reconstructed images were also provided. CLINICAL HISTORY: Pulmonary embolism (PE) suspected, positive D-dimer  soa FINDINGS: The pulmonary arteries are well-opacified and there is no filling defect to indicate pulmonary embolism. The thoracic aorta is normal.  There are small bilateral pleural effusions and interstitial pulmonary edema.  There is mild mediastinal adenopathy also likely due to edema.  There is no significant osseous abnormality.     No pulmonary embolism.  Interstitial pulmonary edema with small pleural effusions. Authenticated and Electronically Signed by Semaj Marshall M.D. on 03/21/2023 08:54:31 PM      Assessment/Plan:      Acute on chronic respiratory failure with hypoxia and hypercapnia (HCC)    COPD with acute exacerbation (HCC)    Acute congestive heart failure (HCC)  --Detailed Medical Reasoning: Need 2 of 3 for Acute respiratory failure: pO2 less than 60 mm Hg (hypoxemia) or O2 sat <90% (yes). pCO2 greater than 50 mm Hg (hypercapnia) with pH less than 7.35 (yes). Signs and symptoms of acute respiratory distress (RR greater than 20 or less than 10, wheezing, nasal flaring, accessory muscle use): (Yes wheezing tachypnea). --(DENIS Jones, The Hospitalist, 2019)    --Plan: We will give steroids nebulizer treatments diuretics, check an echo.    Disposition: Anticipate discharge to home in 2 to 3 days    Prophylaxis: Lovenox    Edited by: Bird Fu DO at 3/21/2023 2231           Risk Assessment: High  DVT Prophylaxis: Lovenox  Code Status:   Code Status and Medical Interventions:   Ordered at: 03/21/23  2232     Code Status (Patient has no pulse and is not breathing):    CPR (Attempt to Resuscitate)     Medical Interventions (Patient has pulse or is breathing):    Full Support      Diet:   Dietary Orders (From admission, onward)     Start     Ordered    03/21/23 2229  Diet: Regular/House Diet; Texture: Regular Texture (IDDSI 7); Fluid Consistency: Thin (IDDSI 0)  Diet Effective Now        References:    Diet Order [place]   Question Answer Comment   Diets: Regular/House Diet    Texture: Regular Texture (IDDSI 7)    Fluid Consistency: Thin (IDDSI 0)        03/21/23 2232                 Advance Care Planning   ACP discussion was held with the patient during this visit. Patient does not have an advance directive, declines further assistance.           Bird Fu DO  03/21/23  22:34 EDT    Dictated utilizing Dragon dictation.

## 2023-03-22 NOTE — CASE MANAGEMENT/SOCIAL WORK
Discharge Planning Assessment  [facility]     Patient Name: Claudia Stokes  MRN: [id number]  Today's Date: 3/22/2023    Admit Date: 3/21/2023    Plan: CM met with pt at bedside for dcp. Pt recently moved here (1 week ago) and lives with her son Rodrigo and dtr in law Crystal. Demographics verified. She has no LW or POA. She has not yet established care with a primary provider and uses [facility]. She is enrolled in meds to bed. Home DME includes wc, scales, pulse ox, shower chair and O2\ 3L continuous baseline. Her provider was Latha in [place], I will notify Latha in [place]. Pt request a walker. She shares household duties, self ADL's, family provides transportation. Denies financial concerns and voices no dc needs. Will follow.   Discharge Needs Assessment     Row Name 03/22/23 1015       Living Environment    People in Home child(yaa), adult    Name(s) of People in Home Rodrigo and Crystal    Current Living Arrangements home    Duration at Residence 1 week    Potentially Unsafe Housing Conditions none    Primary Care Provided by self    Family Caregiver if Needed child(yaa), adult    Quality of Family Relationships helpful;involved    Able to Return to Prior Arrangements yes       Resource/Environmental Concerns    Resource/Environmental Concerns none    Transportation Concerns none       Food Insecurity    Within the past 12 months, you worried that your food would run out before you got the money to buy more. Never true    Within the past 12 months, the food you bought just didn't last and you didn't have money to get more. Never true       Transition Planning    Patient/Family Anticipates Transition to home with family    Patient/Family Anticipated Services at Transition none    Transportation Anticipated family or friend will provide       Discharge Needs Assessment    Readmission Within the Last 30 Days no previous admission in last 30 days    Equipment Currently Used at Home wheelchair;scales     Concerns to be Addressed no discharge needs identified    Anticipated Changes Related to Illness none    Equipment Needed After Discharge walker, rolling               Discharge Plan     Row Name 03/22/23 1018       Plan    Plan CM met with pt at bedside for dcp. Pt recently moved here (1 week ago) and lives with her son Rodrigo and dtr in law Crystal. Demographics verified. She has no LW or POA. She has not yet established care with a primary provider and uses [facility]. She is enrolled in meds to bed. Home DME includes wc, scales, pulse ox, shower chair and O2\ 3L continuous baseline. Her provider was Latha in [place], I will notify Latha in [place]. Pt request a walker. She shares household duties, self ADL's, family provides transportation. Denies financial concerns and voices no dc needs. Will follow.              Continued Care and Services - Admitted Since 3/21/2023    Coordination has not been started for this encounter.       Expected Discharge Date and Time     Expected Discharge Date Expected Discharge Time    Mar 24, 2023          Demographic Summary     Row Name 03/22/23 1009       General Information    Admission Type inpatient    Arrived From emergency department    Required Notices Provided Important Message from Medicare    Referral Source admission list    Reason for Consult discharge planning    Preferred Language English       Contact Information    Permission Granted to Share Info With family/designee    Contact Information Comments son Rodrigo Chong  dtr daniel Rojas Cruz                Functional Status     Row Name 03/22/23 1014       Functional Status    Usual Activity Tolerance moderate    Current Activity Tolerance fair       Functional Status, IADL    Medications independent    Meal Preparation independent    Housekeeping independent    Laundry independent    Shopping assistive equipment and person    IADL Comments pt lives with son crystal in law; shared  household duties       Mental Status    General Appearance WDL WDL       Mental Status Summary    Recent Changes in Mental Status/Cognitive Functioning no changes               Psychosocial    No documentation.                Abuse/Neglect    No documentation.                Legal    No documentation.                Substance Abuse    No documentation.                Patient Forms    No documentation.                   Rosenda Vega RN

## 2023-03-22 NOTE — PLAN OF CARE
Goal Outcome Evaluation:           Progress: no change   VSS. Pt NSR on telemetry throughout the shift. Medications administered per MAR. Pt on 6L high flow NC maintaining o2 >90%, patient encouraged to wear bipap throughout the shift. Discharge is pending for 1-2 more days.

## 2023-03-22 NOTE — PROGRESS NOTES
[facility] HOSPITALIST    PROGRESS NOTE    Name:  Claudia Stokes   Age:  73 y.o.  Sex:  female  :  1950  MRN:  [id number]   Visit Number:  [id number]  Admission Date:  3/21/2023  Date Of Service:  23  Primary Care Physician:  Provider, No Known     LOS: 1 day :    Chief Complaint:      Follow-up; shortness of air.    Subjective:    Breathing feels a lot better, she is typically on 2 to 3 L baseline.    Hospital Course:    Claudia Stokes is a 73-year-old lady new to the area after moving from [place] to [place] and then now to [place] history of COPD with history of exacerbations with 2 to 3 L baseline oxygen requirement, hypertension, mood disorder, GERD.  Reports was in the hospital 4 months ago with flu and pneumonia.  Also reports was treated outpatient for slight pneumonia with Levaquin and Decadron 2 months ago.  Reports chronic lower extremity edema and a heart murmur but reports she has never had an echo.  Reports that her breathing had actually been doing better up until about 3 days ago.  Also her chronic cough had improved.  However over that period of time its worse and is well.  Consider dry nonproductive cough.  No fevers reported.  In the emergency department it was reported that the patient had required BiPAP and by the time of my exam she was on a nonrebreather mask.  She elects to be full code.  Declines advanced directives.    Pertinent findings: pH 7.327, PCO2 67.3, bicarb 29.1, alkaline phosphatase 147, CRP 6.07, COVID and flu negative, chest x-ray shows a bilateral predominantly basilar interstitial haziness with bibasilar effusions left greater than right without focal consolidation, CT of the chest shows moderate to severe emphysema bilateral effusions, bilateral groundglass opacities, multiple reticulonodular which may be chronic or related to acute disease process would recommend repeat CT to follow-up on this in 4 weeks.    Review of Systems:     All  systems were reviewed and negative except as mentioned in subjective, assessment and plan.    Vital Signs:    Temp:  [97.5 °F (36.4 °C)-98.3 °F (36.8 °C)] 97.6 °F (36.4 °C)  Heart Rate:  [] 87  Resp:  [20-34] 20  BP: (116-167)/() 159/80    Intake and output:    I/O last 3 completed shifts:  In: 290 [P.O.:240; I.V.:50]  Out: 1300 [Urine:1300]  I/O this shift:  In: 360 [P.O.:360]  Out: 550 [Urine:550]    Physical Examination:    General Appearance:  Alert and cooperative.    Head:  Atraumatic and normocephalic.   Eyes: Conjunctivae and sclerae normal, no icterus. No pallor.   Throat: No oral lesions, no thrush, oral mucosa moist.   Neck: Supple, trachea midline, no thyromegaly.   Lungs:   Breath sounds heard bilaterally equally.  No wheezing or crackles. No Pleural rub or bronchial breathing.   Heart:  Normal S1 and S2, no murmur, no gallop, no rub. No JVD.   Abdomen:   Normal bowel sounds, no masses, no organomegaly. Soft, nontender, nondistended, no rebound tenderness.   Extremities: Supple, no edema, no cyanosis, no clubbing.   Skin:  Warm.   Neurologic: Alert and oriented x 3. No facial asymmetry. Moves all four limbs. No tremors.      Laboratory results:    Results from last 7 days   Lab Units 03/22/23  0552 03/21/23  1716   SODIUM mmol/L 137 137   POTASSIUM mmol/L 5.2 4.9   CHLORIDE mmol/L 94* 97*   CO2 mmol/L 33.2* 29.1*   BUN mg/dL 12 7*   CREATININE mg/dL 0.84 0.69   CALCIUM mg/dL 9.1 9.4   BILIRUBIN mg/dL 0.3 0.4   ALK PHOS U/L 142* 147*   ALT (SGPT) U/L 20 23   AST (SGOT) U/L 20 25   GLUCOSE mg/dL 237* 116*     Results from last 7 days   Lab Units 03/22/23  0552 03/21/23  1716   WBC 10*3/mm3 6.82 9.49   HEMOGLOBIN g/dL 9.9* 10.8*   HEMATOCRIT % 34.2 36.2   PLATELETS 10*3/mm3 299 301         Results from last 7 days   Lab Units 03/21/23  1942 03/21/23  1716   HSTROP T ng/L <6 <6  <6         Recent Labs     03/21/23  1723   PHART 7.327*   [id number] 67.3*   PO2ART 76.6   [id number] 35.2*   BASEEXCESS  7.5*      I have reviewed the patient's laboratory results.    Radiology results:    CT Angiogram Chest Pulmonary Embolism    Result Date: 3/21/2023  FINAL REPORT TECHNIQUE: Thin section axial images were obtained through the chest and during the arterial phase of IV contrast administration. Coronal 3-D MIP reconstructed images were also provided. CLINICAL HISTORY: Pulmonary embolism (PE) suspected, positive D-dimer  soa FINDINGS: The pulmonary arteries are well-opacified and there is no filling defect to indicate pulmonary embolism. The thoracic aorta is normal.  There are small bilateral pleural effusions and interstitial pulmonary edema.  There is mild mediastinal adenopathy also likely due to edema.  There is no significant osseous abnormality.     Impression: No pulmonary embolism.  Interstitial pulmonary edema with small pleural effusions. Authenticated and Electronically Signed by Semaj Marshall M.D. on 03/21/2023 08:54:31 PM    I have reviewed the patient's radiology reports.    Medication Review:     I have reviewed the patient's active and prn medications.     Problem List:      Acute on chronic respiratory failure with hypoxia and hypercapnia (HCC)    COPD with acute exacerbation (HCC)    Acute congestive heart failure (HCC)    Acute-on-chronic respiratory failure (HCC)      Assessment/Plan:    Inpatient general floor admission 3/21/2023 with acute on chronic respiratory failure with hypoxia and hypercapnia, 2-3 L baseline, secondary to COPD exacerbation and acute congestive heart failure, initially requiring BiPAP and quickly able to be transitioned to nonrebreather.  Able to be weaned down to 6 L by following morning.  Refused nighttime BiPAP.    Acute on chronic respiratory failure with hypoxia, POA  COPD exacerbation, POA  Acute congestive heart failure, POA  Steroids.  Bronchodilators.  Diuretics.  Echocardiogram.     Says she had a sleep study in the past and told she does not need CPAP.    Continue  other home medications.      DVT Prophylaxis: Lovenox  Code Status: Full code  Diet: Cardiac  Discharge Plan: 1-2 more days depending on clinical status    Brian Joseph Kerley, DO  03/22/23  09:08 EDT    Dictated utilizing Dragon dictation.

## 2023-03-23 PROBLEM — Z78.9 IMPAIRED MOBILITY AND ADLS: Chronic | Status: ACTIVE | Noted: 2023-03-23

## 2023-03-23 PROBLEM — Z74.09 IMPAIRED MOBILITY AND ADLS: Chronic | Status: ACTIVE | Noted: 2023-03-23

## 2023-03-23 LAB
ANION GAP SERPL CALCULATED.3IONS-SCNC: 10.3 MMOL/L (ref 5–15)
BUN SERPL-MCNC: 20 MG/DL (ref 8–23)
BUN/CREAT SERPL: 22.2 (ref 7–25)
CALCIUM SPEC-SCNC: 9.6 MG/DL (ref 8.6–10.5)
CHLORIDE SERPL-SCNC: 91 MMOL/L (ref 98–107)
CO2 SERPL-SCNC: 36.7 MMOL/L (ref 22–29)
CREAT SERPL-MCNC: 0.9 MG/DL (ref 0.57–1)
EGFRCR SERPLBLD CKD-EPI 2021: 68.1 ML/MIN/1.73
GLUCOSE BLDC GLUCOMTR-MCNC: 163 MG/DL (ref 70–130)
GLUCOSE BLDC GLUCOMTR-MCNC: 180 MG/DL (ref 70–130)
GLUCOSE BLDC GLUCOMTR-MCNC: 286 MG/DL (ref 70–130)
GLUCOSE BLDC GLUCOMTR-MCNC: 443 MG/DL (ref 70–130)
GLUCOSE SERPL-MCNC: 199 MG/DL (ref 65–99)
HBA1C MFR BLD: 5.8 % (ref 4.8–5.6)
POTASSIUM SERPL-SCNC: 4 MMOL/L (ref 3.5–5.2)
SODIUM SERPL-SCNC: 138 MMOL/L (ref 136–145)

## 2023-03-23 PROCEDURE — 83036 HEMOGLOBIN GLYCOSYLATED A1C: CPT | Performed by: STUDENT IN AN ORGANIZED HEALTH CARE EDUCATION/TRAINING PROGRAM

## 2023-03-23 PROCEDURE — 94799 UNLISTED PULMONARY SVC/PX: CPT

## 2023-03-23 PROCEDURE — 94760 N-INVAS EAR/PLS OXIMETRY 1: CPT

## 2023-03-23 PROCEDURE — 80048 BASIC METABOLIC PNL TOTAL CA: CPT | Performed by: STUDENT IN AN ORGANIZED HEALTH CARE EDUCATION/TRAINING PROGRAM

## 2023-03-23 PROCEDURE — 82962 GLUCOSE BLOOD TEST: CPT

## 2023-03-23 PROCEDURE — 94660 CPAP INITIATION&MGMT: CPT

## 2023-03-23 PROCEDURE — 63710000001 INSULIN ASPART PER 5 UNITS: Performed by: STUDENT IN AN ORGANIZED HEALTH CARE EDUCATION/TRAINING PROGRAM

## 2023-03-23 PROCEDURE — 25010000002 ENOXAPARIN PER 10 MG: Performed by: INTERNAL MEDICINE

## 2023-03-23 PROCEDURE — 25010000002 FUROSEMIDE PER 20 MG: Performed by: INTERNAL MEDICINE

## 2023-03-23 PROCEDURE — 99232 SBSQ HOSP IP/OBS MODERATE 35: CPT | Performed by: STUDENT IN AN ORGANIZED HEALTH CARE EDUCATION/TRAINING PROGRAM

## 2023-03-23 PROCEDURE — 94761 N-INVAS EAR/PLS OXIMETRY MLT: CPT

## 2023-03-23 PROCEDURE — 94664 DEMO&/EVAL PT USE INHALER: CPT

## 2023-03-23 PROCEDURE — 63710000001 PREDNISONE PER 1 MG: Performed by: INTERNAL MEDICINE

## 2023-03-23 RX ORDER — INSULIN ASPART 100 [IU]/ML
0-9 INJECTION, SOLUTION INTRAVENOUS; SUBCUTANEOUS
Status: DISCONTINUED | OUTPATIENT
Start: 2023-03-23 | End: 2023-03-24 | Stop reason: HOSPADM

## 2023-03-23 RX ORDER — DEXTROSE MONOHYDRATE 25 G/50ML
25 INJECTION, SOLUTION INTRAVENOUS
Status: DISCONTINUED | OUTPATIENT
Start: 2023-03-23 | End: 2023-03-24 | Stop reason: HOSPADM

## 2023-03-23 RX ORDER — INSULIN ASPART 100 [IU]/ML
6 INJECTION, SOLUTION INTRAVENOUS; SUBCUTANEOUS ONCE
Status: DISCONTINUED | OUTPATIENT
Start: 2023-03-23 | End: 2023-03-24 | Stop reason: HOSPADM

## 2023-03-23 RX ORDER — NICOTINE POLACRILEX 4 MG
15 LOZENGE BUCCAL
Status: DISCONTINUED | OUTPATIENT
Start: 2023-03-23 | End: 2023-03-24 | Stop reason: HOSPADM

## 2023-03-23 RX ADMIN — ACETAMINOPHEN 650 MG: 325 TABLET, FILM COATED ORAL at 16:21

## 2023-03-23 RX ADMIN — BUDESONIDE AND FORMOTEROL FUMARATE DIHYDRATE 2 PUFF: 160; 4.5 AEROSOL RESPIRATORY (INHALATION) at 21:31

## 2023-03-23 RX ADMIN — ESCITALOPRAM OXALATE 20 MG: 20 TABLET ORAL at 08:46

## 2023-03-23 RX ADMIN — PANTOPRAZOLE SODIUM 40 MG: 40 TABLET, DELAYED RELEASE ORAL at 06:09

## 2023-03-23 RX ADMIN — INSULIN ASPART 2 UNITS: 100 INJECTION, SOLUTION INTRAVENOUS; SUBCUTANEOUS at 17:14

## 2023-03-23 RX ADMIN — TIOTROPIUM BROMIDE INHALATION SPRAY 2 PUFF: 3.12 SPRAY, METERED RESPIRATORY (INHALATION) at 07:16

## 2023-03-23 RX ADMIN — ROPINIROLE HYDROCHLORIDE 1 MG: 1 TABLET, FILM COATED ORAL at 21:46

## 2023-03-23 RX ADMIN — AMLODIPINE BESYLATE 2.5 MG: 5 TABLET ORAL at 21:21

## 2023-03-23 RX ADMIN — FUROSEMIDE 40 MG: 10 INJECTION, SOLUTION INTRAMUSCULAR; INTRAVENOUS at 17:14

## 2023-03-23 RX ADMIN — BUDESONIDE AND FORMOTEROL FUMARATE DIHYDRATE 2 PUFF: 160; 4.5 AEROSOL RESPIRATORY (INHALATION) at 07:16

## 2023-03-23 RX ADMIN — LISINOPRIL 10 MG: 10 TABLET ORAL at 21:21

## 2023-03-23 RX ADMIN — PREDNISONE 40 MG: 20 TABLET ORAL at 08:44

## 2023-03-23 RX ADMIN — ARIPIPRAZOLE 2 MG: 2 TABLET ORAL at 08:46

## 2023-03-23 RX ADMIN — Medication 10 ML: at 21:46

## 2023-03-23 RX ADMIN — Medication 10 ML: at 08:44

## 2023-03-23 RX ADMIN — MAGNESIUM GLUCONATE 500 MG ORAL TABLET 400 MG: 500 TABLET ORAL at 21:21

## 2023-03-23 RX ADMIN — IPRATROPIUM BROMIDE AND ALBUTEROL SULFATE 3 ML: .5; 3 SOLUTION RESPIRATORY (INHALATION) at 21:31

## 2023-03-23 RX ADMIN — Medication 10 ML: at 08:58

## 2023-03-23 RX ADMIN — SENNOSIDES AND DOCUSATE SODIUM 2 TABLET: 50; 8.6 TABLET ORAL at 08:45

## 2023-03-23 RX ADMIN — IPRATROPIUM BROMIDE AND ALBUTEROL SULFATE 3 ML: .5; 3 SOLUTION RESPIRATORY (INHALATION) at 17:07

## 2023-03-23 RX ADMIN — ENOXAPARIN SODIUM 40 MG: 100 INJECTION SUBCUTANEOUS at 10:31

## 2023-03-23 RX ADMIN — MAGNESIUM GLUCONATE 500 MG ORAL TABLET 400 MG: 500 TABLET ORAL at 08:47

## 2023-03-23 RX ADMIN — FUROSEMIDE 40 MG: 10 INJECTION, SOLUTION INTRAMUSCULAR; INTRAVENOUS at 08:43

## 2023-03-23 RX ADMIN — ENOXAPARIN SODIUM 40 MG: 100 INJECTION SUBCUTANEOUS at 21:46

## 2023-03-23 RX ADMIN — INSULIN ASPART 2 UNITS: 100 INJECTION, SOLUTION INTRAVENOUS; SUBCUTANEOUS at 12:04

## 2023-03-23 NOTE — PROGRESS NOTES
[facility] HOSPITALIST    PROGRESS NOTE    Name:  Claudia Stokes   Age:  73 y.o.  Sex:  female  :  1950  MRN:  [id number]   Visit Number:  [id number]  Admission Date:  3/21/2023  Date Of Service:  23  Primary Care Physician:  Provider, No Known     LOS: 2 days :    Chief Complaint:      Follow-up; shortness of air.    Subjective:    Breathing good today, she is typically on 2 to 3 L baseline.  Says recently she has been drinking a lot more water.    Hospital Course:    Claudia Stokes is a 73-year-old lady new to the area after moving from [place] to [place] and then now to [place] history of COPD with history of exacerbations with 2 to 3 L baseline oxygen requirement, hypertension, mood disorder, GERD, anemia.  Reports was in the hospital 4 months ago with flu and pneumonia.  Also reports was treated outpatient for slight pneumonia with Levaquin and Decadron 2 months ago.  Reports chronic lower extremity edema and a heart murmur but reports she has never had an echo.  Reports that her breathing had actually been doing better up until about 3 days ago.  Also her chronic cough had improved.  However over that period of time its worse and is well.  Consider dry nonproductive cough.  No fevers reported.  In the emergency department it was reported that the patient had required BiPAP and by the time of my exam she was on a nonrebreather mask.  She elects to be full code.  Declines advanced directives.    Pertinent findings: pH 7.327, PCO2 67.3, bicarb 29.1, alkaline phosphatase 147, CRP 6.07, COVID and flu negative, chest x-ray shows a bilateral predominantly basilar interstitial haziness with bibasilar effusions left greater than right without focal consolidation, CT of the chest shows moderate to severe emphysema bilateral effusions, bilateral groundglass opacities, multiple reticulonodular which may be chronic or related to acute disease process would recommend repeat CT to  follow-up on this in 4 weeks.    Review of Systems:     All systems were reviewed and negative except as mentioned in subjective, assessment and plan.    Vital Signs:    Temp:  [97.1 °F (36.2 °C)-98.4 °F (36.9 °C)] 97.1 °F (36.2 °C)  Heart Rate:  [67-95] 67  Resp:  [18-20] 18  BP: (136-162)/(64-77) 136/64    Intake and output:    I/O last 3 completed shifts:  In: 1680 [P.O.:1680]  Out: 2151 [Urine:2150; Stool:1]  I/O this shift:  In: 360 [P.O.:360]  Out: -     Physical Examination:    General Appearance:  Alert and cooperative.    Head:  Atraumatic and normocephalic.   Eyes: Conjunctivae and sclerae normal, no icterus. No pallor.   Throat: No oral lesions, no thrush, oral mucosa moist.   Neck: Supple, trachea midline, no thyromegaly.   Lungs:    Slight bibasilar crackles   Heart:  Normal S1 and S2, no murmur, no gallop, no rub. No JVD.   Abdomen:   Normal bowel sounds, no masses, no organomegaly. Soft, nontender, nondistended, no rebound tenderness.   Extremities: Supple, no edema, no cyanosis, no clubbing.   Skin:  Warm.   Neurologic: Alert and oriented x 3. No facial asymmetry. Moves all four limbs. No tremors.      Laboratory results:    Results from last 7 days   Lab Units 03/22/23  0552 03/21/23  1716   SODIUM mmol/L 137 137   POTASSIUM mmol/L 5.2 4.9   CHLORIDE mmol/L 94* 97*   CO2 mmol/L 33.2* 29.1*   BUN mg/dL 12 7*   CREATININE mg/dL 0.84 0.69   CALCIUM mg/dL 9.1 9.4   BILIRUBIN mg/dL 0.3 0.4   ALK PHOS U/L 142* 147*   ALT (SGPT) U/L 20 23   AST (SGOT) U/L 20 25   GLUCOSE mg/dL 237* 116*     Results from last 7 days   Lab Units 03/22/23  0552 03/21/23  1716   WBC 10*3/mm3 6.82 9.49   HEMOGLOBIN g/dL 9.9* 10.8*   HEMATOCRIT % 34.2 36.2   PLATELETS 10*3/mm3 299 301         Results from last 7 days   Lab Units 03/21/23  1942 03/21/23  1716   HSTROP T ng/L <6 <6  <6         Recent Labs     03/21/23  1723   PHART 7.327*   [id number] 67.3*   PO2ART 76.6   [id number] 35.2*   BASEEXCESS 7.5*      I have reviewed the  patient's laboratory results.    Radiology results:    Adult Transthoracic Echo Complete With Contrast if Necessary Per Protocol    Result Date: 3/22/2023  1.  Normal left ventricular size and systolic function, LVEF 65-70%. 2.  Mild concentric LVH. 3.  Normal LV diastolic filling pattern. 4.  Normal right ventricular size and systolic function. 5.  Normal left atrial volume index. 6.  No significant valvular abnormalities.     XR Chest 1 View    Result Date: 3/22/2023  PROCEDURE: XR CHEST 1 VW-  INDICATION:  SOB  FINDINGS:  A portable view of the chest was obtained.  There is no prior exam for comparison.  The heart is enlarged. There are bilateral perihilar and bibasilar opacities with small bilateral pleural effusions. No pneumothorax.      Impression: Bilateral opacities and pleural effusions. Favor pulmonary edema. Pneumonia not excluded. Recommend radiographic followup.  This report was signed and finalized on 3/22/2023 12:01 PM by Aida Rosales MD.    CT Angiogram Chest Pulmonary Embolism    Result Date: 3/21/2023  FINAL REPORT TECHNIQUE: Thin section axial images were obtained through the chest and during the arterial phase of IV contrast administration. Coronal 3-D MIP reconstructed images were also provided. CLINICAL HISTORY: Pulmonary embolism (PE) suspected, positive D-dimer  soa FINDINGS: The pulmonary arteries are well-opacified and there is no filling defect to indicate pulmonary embolism. The thoracic aorta is normal.  There are small bilateral pleural effusions and interstitial pulmonary edema.  There is mild mediastinal adenopathy also likely due to edema.  There is no significant osseous abnormality.     Impression: No pulmonary embolism.  Interstitial pulmonary edema with small pleural effusions. Authenticated and Electronically Signed by Semaj Marshall M.D. on 03/21/2023 08:54:31 PM    I have reviewed the patient's radiology reports.    Medication Review:     I have reviewed the patient's active and  prn medications.     Problem List:      Acute on chronic respiratory failure with hypoxia and hypercapnia (HCC)    COPD with acute exacerbation (HCC)    Acute congestive heart failure (HCC)    Acute-on-chronic respiratory failure (HCC)      Assessment/Plan:    Inpatient general floor admission 3/21/2023 with acute on chronic respiratory failure with hypoxia and hypercapnia, 2-3 L baseline, secondary to COPD exacerbation and acute congestive heart failure, initially requiring BiPAP and quickly able to be transitioned to nonrebreather.  Able to be weaned down to 6 L by following morning.  Refused nighttime BiPAP.    Acute on chronic respiratory failure with hypoxia, POA  COPD exacerbation, POA  Acute congestive heart failure, POA  Steroids.  Bronchodilators.  Diuretics.      Titrated down to 4 L.  Unclear I/O status.  May be net no change.  Echocardiogram 3/22; EF 65-70%, mild concentric LVH, normal diastolic filling pattern, otherwise insignificant.    Says she had a sleep study in the past and told she does not need CPAP. Has refused nightly BiPap.     Continue other home medications.      DVT Prophylaxis: Lovenox  Code Status: Full code  Diet: Cardiac  Discharge Plan: Possible discharge as early as 3/24 if on baseline oxygen    Brian Joseph Kerley, DO  03/23/23  09:05 EDT    Dictated utilizing Dragon dictation.

## 2023-03-23 NOTE — PLAN OF CARE
Goal Outcome Evaluation:   Interventions implemented as appropriate. Plan of care reviewed with patient. Activity adjusted as by patient tolerates. [place] encouraged. Incentive spirometer encouraged, patient verbalized understanding and is able to use incentive spirometer appropriately. Patient education provided as appropriate.            Outcome Evaluation: (P) Plan of care reviewed with patient. Patient expressed readiness to be discharged and understands the health care team is working towards tirating O2 (Liters) down to home baseline.

## 2023-03-23 NOTE — PLAN OF CARE
Goal Outcome Evaluation:  Plan of Care Reviewed With: patient        Progress: no change  Outcome Evaluation: No acute events during the night. Vital Signs Stable. Patient encouraged to wear the Bipap, patient states she is unable to tolerate it. 5LHFNC in use. Will continue to monitor.

## 2023-03-24 ENCOUNTER — READMISSION MANAGEMENT (OUTPATIENT)
Dept: CALL CENTER | Facility: HOSPITAL | Age: 73
End: 2023-03-24
Payer: MEDICARE

## 2023-03-24 VITALS
TEMPERATURE: 97.6 F | OXYGEN SATURATION: 93 % | RESPIRATION RATE: 18 BRPM | HEIGHT: 63 IN | WEIGHT: 255.07 LBS | BODY MASS INDEX: 45.2 KG/M2 | HEART RATE: 93 BPM | DIASTOLIC BLOOD PRESSURE: 73 MMHG | SYSTOLIC BLOOD PRESSURE: 157 MMHG

## 2023-03-24 PROBLEM — I50.9 ACUTE CONGESTIVE HEART FAILURE: Status: RESOLVED | Noted: 2023-03-21 | Resolved: 2023-03-24

## 2023-03-24 PROBLEM — J96.20 ACUTE-ON-CHRONIC RESPIRATORY FAILURE: Status: RESOLVED | Noted: 2023-03-21 | Resolved: 2023-03-24

## 2023-03-24 PROBLEM — J44.1 COPD WITH ACUTE EXACERBATION: Status: RESOLVED | Noted: 2023-03-21 | Resolved: 2023-03-24

## 2023-03-24 PROBLEM — J96.22 ACUTE ON CHRONIC RESPIRATORY FAILURE WITH HYPOXIA AND HYPERCAPNIA: Status: RESOLVED | Noted: 2023-03-21 | Resolved: 2023-03-24

## 2023-03-24 PROBLEM — J96.21 ACUTE ON CHRONIC RESPIRATORY FAILURE WITH HYPOXIA AND HYPERCAPNIA: Status: RESOLVED | Noted: 2023-03-21 | Resolved: 2023-03-24

## 2023-03-24 LAB
ANION GAP SERPL CALCULATED.3IONS-SCNC: 9.4 MMOL/L (ref 5–15)
BUN SERPL-MCNC: 29 MG/DL (ref 8–23)
BUN/CREAT SERPL: 29.9 (ref 7–25)
CALCIUM SPEC-SCNC: 9.5 MG/DL (ref 8.6–10.5)
CHLORIDE SERPL-SCNC: 92 MMOL/L (ref 98–107)
CO2 SERPL-SCNC: 38.6 MMOL/L (ref 22–29)
CREAT SERPL-MCNC: 0.97 MG/DL (ref 0.57–1)
EGFRCR SERPLBLD CKD-EPI 2021: 62.2 ML/MIN/1.73
GLUCOSE BLDC GLUCOMTR-MCNC: 123 MG/DL (ref 70–130)
GLUCOSE SERPL-MCNC: 114 MG/DL (ref 65–99)
POTASSIUM SERPL-SCNC: 4 MMOL/L (ref 3.5–5.2)
SODIUM SERPL-SCNC: 140 MMOL/L (ref 136–145)

## 2023-03-24 PROCEDURE — 94799 UNLISTED PULMONARY SVC/PX: CPT

## 2023-03-24 PROCEDURE — 94664 DEMO&/EVAL PT USE INHALER: CPT

## 2023-03-24 PROCEDURE — 94761 N-INVAS EAR/PLS OXIMETRY MLT: CPT

## 2023-03-24 PROCEDURE — 80048 BASIC METABOLIC PNL TOTAL CA: CPT | Performed by: STUDENT IN AN ORGANIZED HEALTH CARE EDUCATION/TRAINING PROGRAM

## 2023-03-24 PROCEDURE — 63710000001 PREDNISONE PER 1 MG: Performed by: INTERNAL MEDICINE

## 2023-03-24 PROCEDURE — 99239 HOSP IP/OBS DSCHRG MGMT >30: CPT | Performed by: STUDENT IN AN ORGANIZED HEALTH CARE EDUCATION/TRAINING PROGRAM

## 2023-03-24 PROCEDURE — 25010000002 FUROSEMIDE PER 20 MG: Performed by: INTERNAL MEDICINE

## 2023-03-24 PROCEDURE — 94660 CPAP INITIATION&MGMT: CPT

## 2023-03-24 PROCEDURE — 82962 GLUCOSE BLOOD TEST: CPT

## 2023-03-24 RX ORDER — BUDESONIDE AND FORMOTEROL FUMARATE DIHYDRATE 160; 4.5 UG/1; UG/1
2 AEROSOL RESPIRATORY (INHALATION)
Qty: 10.2 G | Refills: 0 | Status: SHIPPED | OUTPATIENT
Start: 2023-03-24 | End: 2023-04-11

## 2023-03-24 RX ORDER — FUROSEMIDE 40 MG/1
40 TABLET ORAL DAILY
Qty: 7 TABLET | Refills: 0 | Status: SHIPPED | OUTPATIENT
Start: 2023-03-24 | End: 2023-04-11

## 2023-03-24 RX ADMIN — Medication 10 ML: at 08:17

## 2023-03-24 RX ADMIN — ACETAMINOPHEN 650 MG: 325 TABLET, FILM COATED ORAL at 04:35

## 2023-03-24 RX ADMIN — TIOTROPIUM BROMIDE INHALATION SPRAY 2 PUFF: 3.12 SPRAY, METERED RESPIRATORY (INHALATION) at 07:13

## 2023-03-24 RX ADMIN — MAGNESIUM GLUCONATE 500 MG ORAL TABLET 400 MG: 500 TABLET ORAL at 08:16

## 2023-03-24 RX ADMIN — FUROSEMIDE 40 MG: 10 INJECTION, SOLUTION INTRAMUSCULAR; INTRAVENOUS at 08:17

## 2023-03-24 RX ADMIN — PANTOPRAZOLE SODIUM 40 MG: 40 TABLET, DELAYED RELEASE ORAL at 05:03

## 2023-03-24 RX ADMIN — BUDESONIDE AND FORMOTEROL FUMARATE DIHYDRATE 2 PUFF: 160; 4.5 AEROSOL RESPIRATORY (INHALATION) at 07:13

## 2023-03-24 RX ADMIN — ARIPIPRAZOLE 2 MG: 2 TABLET ORAL at 08:16

## 2023-03-24 RX ADMIN — IPRATROPIUM BROMIDE AND ALBUTEROL SULFATE 3 ML: .5; 3 SOLUTION RESPIRATORY (INHALATION) at 07:18

## 2023-03-24 RX ADMIN — PREDNISONE 40 MG: 20 TABLET ORAL at 08:16

## 2023-03-24 RX ADMIN — ESCITALOPRAM OXALATE 20 MG: 20 TABLET ORAL at 08:16

## 2023-03-24 NOTE — PLAN OF CARE
Problem: Adult Inpatient Plan of Care  Goal: Plan of Care Review  Outcome: Ongoing, Progressing   Goal Outcome Evaluation:            Pt remains on 3L NC throughout the night only reporting shortness of breath with activity. Call light within reach bed alarm on.

## 2023-03-24 NOTE — OUTREACH NOTE
Prep Survey    Flowsheet Row Responses   [facility] [place] patient discharged from? [place]   Is LACE score < 7 ? No   Eligibility [facility]   Date of Admission 03/21/23   Date of Discharge 03/24/23   Discharge Disposition Home or Self Care   Discharge diagnosis Acute on chronic respiratory failure with hypoxia and hypercapnia    Does the patient have one of the following disease processes/diagnoses(primary or secondary)? Other   Does the patient have Home health ordered? No   Is there a DME ordered? Yes   What DME was ordered? walker--[facility]   Comments regarding appointments new PCP appt   Medication alerts for this patient see AVS for meds   Prep survey completed? Yes          Nory WATSON - Registered Nurse

## 2023-03-24 NOTE — DISCHARGE SUMMARY
[facility]   DISCHARGE SUMMARY      Name:  Claudia Stokes   Age:  73 y.o.  Sex:  female  :  1950  MRN:  [id number]   Visit Number:  [id number]    Admission Date:  3/21/2023  Date of Discharge:  3/24/2023  Primary Care Physician:  Provider, No Known    Important issues to note:    -Acute on chronic respiratory failure resolved, back to 3 L baseline.  Did require BiPAP upon admission with stepwise consistent titration down thereafter.  Improved with IV diuresis.  She had about 2.5 L removed.  -Echocardiogram 3/22; EF 65-70%, mild concentric LVH, normal diastolic filling pattern, otherwise insignificant.  No evidence of structural heart failure, do suspect the patient's volume overload partly due to consciously drinking significantly more water for months.    -Will establish care with new PCP as she is new to this area.  A1c 5.8%.  -Provided Symbicort.  -Providing short-term Lasix.  Discussed caution regarding frequent use of Lasix not supervised by PCP.    Discharge Diagnoses:     Volume overload, resolved  Acute on chronic respiratory failure with hypoxia, resolved  Chronic respiratory failure with hypoxia  COPD      Problem List:     Active Hospital Problems    Diagnosis  POA   • Impaired mobility and ADLs [Z74.09, Z78.9]  Yes      Resolved Hospital Problems    Diagnosis Date Resolved POA   • **Acute on chronic respiratory failure with hypoxia and hypercapnia (HCC) [J96.21, J96.22] 2023 Yes   • COPD with acute exacerbation (HCC) [J44.1] 2023 Yes   • Acute congestive heart failure (HCC) [I50.9] 2023 Yes   • Acute-on-chronic respiratory failure (HCC) [J96.20] 2023 Yes     Presenting Problem:    Chief Complaint   Patient presents with   • Shortness of Breath      Consults:     Consulting Physician(s)             None          Procedures Performed:    None    History of presenting illness/Hospital Course:    Claudia Stokes is a 73-year-old lady new to the area  after moving from [place] to [place] and then now to [place] history of COPD with history of exacerbations with 2 to 3 L baseline oxygen requirement, hypertension, mood disorder, GERD, anemia.  Reports was in the hospital 4 months ago with flu and pneumonia.  Also reports was treated outpatient for slight pneumonia with Levaquin and Decadron 2 months ago.  Reports chronic lower extremity edema and a heart murmur but reports she has never had an echo.  Reports that her breathing had actually been doing better up until about 3 days ago.  Also her chronic cough had improved.  However over that period of time its worse and is well.  Consider dry nonproductive cough.  No fevers reported.  In the emergency department it was reported that the patient had required BiPAP and by the time of admission was on a nonrebreather mask.  She elects to be full code.  Declines advanced directives.    Pertinent findings: pH 7.327, PCO2 67.3, bicarb 29.1, alkaline phosphatase 147, CRP 6.07, COVID and flu negative, chest x-ray shows a bilateral predominantly basilar interstitial haziness with bibasilar effusions left greater than right without focal consolidation, CT of the chest shows moderate to severe emphysema bilateral effusions, bilateral groundglass opacities, multiple reticulonodular which may be chronic or related to acute disease process would recommend repeat CT to follow-up on this in 4 weeks.    Inpatient general floor admission 3/21/2023 with acute on chronic respiratory failure with hypoxia and hypercapnia, 2-3 L baseline, secondary to COPD exacerbation and acute congestive heart failure, initially requiring BiPAP and quickly able to be transitioned to nonrebreather.  Able to be weaned down to 6 L by following morning.  Refused nighttime BiPAP.  By 3/24 stable on her baseline 3 L.    -Acute on chronic respiratory failure resolved, back to 3 L baseline.  Did require BiPAP upon admission with stepwise consistent  titration down thereafter.  Improved with IV diuresis.  She had about 2.5 L removed.  -Echocardiogram 3/22; EF 65-70%, mild concentric LVH, normal diastolic filling pattern, otherwise insignificant.  No evidence of structural heart failure, do suspect the patient's volume overload partly due to consciously drinking significantly more water for months.    -Will establish care with new PCP as she is new to this area.  A1c 5.8%.  -Providing short-term Lasix.  Discussed caution regarding frequent use of Lasix not supervised by PCP.    Vital Signs:    Temp:  [97 °F (36.1 °C)-98 °F (36.7 °C)] 97.6 °F (36.4 °C)  Heart Rate:  [] 93  Resp:  [17-18] 18  BP: (144-161)/(60-74) 157/73    Physical Exam:    General Appearance:  Alert and cooperative.    Head:  Atraumatic and normocephalic.   Eyes: Conjunctivae and sclerae normal, no icterus. No pallor.   Ears:  Ears with no abnormalities noted.   Throat: No oral lesions, no thrush, oral mucosa moist.   Neck: Supple, trachea midline, no thyromegaly.   Back:   No kyphoscoliosis present. No tenderness to palpation.   Lungs:    Moderately diminished all fields, slight expiratory wheeze in all fields   Heart:  Normal S1 and S2, no murmur, no gallop, no rub. No JVD.   Abdomen:   Normal bowel sounds, no masses, no organomegaly. Soft, nontender, nondistended, no rebound tenderness.   Extremities: Supple, no edema, no cyanosis, no clubbing.   Pulses: Pulses palpable bilaterally.   Skin:  Warm   Neurologic: Alert and oriented x 3. No facial asymmetry. Moves all four limbs. No tremors.     Pertinent Lab Results:     Results from last 7 days   Lab Units 03/24/23  0551 03/23/23  0918 03/22/23  0552 03/21/23  1716   SODIUM mmol/L 140 138 137 137   POTASSIUM mmol/L 4.0 4.0 5.2 4.9   CHLORIDE mmol/L 92* 91* 94* 97*   CO2 mmol/L 38.6* 36.7* 33.2* 29.1*   BUN mg/dL 29* 20 12 7*   CREATININE mg/dL 0.97 0.90 0.84 0.69   CALCIUM mg/dL 9.5 9.6 9.1 9.4   BILIRUBIN mg/dL  --   --  0.3 0.4   ALK  PHOS U/L  --   --  142* 147*   ALT (SGPT) U/L  --   --  20 23   AST (SGOT) U/L  --   --  20 25   GLUCOSE mg/dL 114* 199* 237* 116*     Results from last 7 days   Lab Units 03/22/23  0552 03/21/23  1716   WBC 10*3/mm3 6.82 9.49   HEMOGLOBIN g/dL 9.9* 10.8*   HEMATOCRIT % 34.2 36.2   PLATELETS 10*3/mm3 299 301         Results from last 7 days   Lab Units 03/21/23  1942 03/21/23  1716   HSTROP T ng/L <6 <6  <6     Results from last 7 days   Lab Units 03/21/23  1716   PROBNP pg/mL 258.9             Results from last 7 days   Lab Units 03/21/23  1723   PH, ARTERIAL pH units 7.327*   PO2 ART mm Hg 76.6   PCO2, ARTERIAL mm Hg 67.3*   HCO3 ART mmol/L 35.2*           Pertinent Radiology Results:    Imaging Results (All)     Procedure Component Value Units Date/Time    XR Chest 1 View [[id number]] Collected: 03/22/23 1125     Updated: 03/22/23 1203    Narrative:      PROCEDURE: XR CHEST 1 VW-     INDICATION:  SOB     FINDINGS:  A portable view of the chest was obtained.  There is no prior  exam for comparison.  The heart is enlarged. There are bilateral  perihilar and bibasilar opacities with small bilateral pleural  effusions. No pneumothorax.       Impression:      Bilateral opacities and pleural effusions. Favor pulmonary  edema. Pneumonia not excluded. Recommend radiographic followup.     This report was signed and finalized on 3/22/2023 12:01 PM by Aida Rosales MD.    CT Angiogram Chest Pulmonary Embolism [[id number]] Collected: 03/21/23 2054     Updated: 03/21/23 2055    Narrative:      FINAL REPORT    TECHNIQUE:  Thin section axial images were obtained through the chest and  during the arterial phase of IV contrast administration. Coronal  3-D MIP reconstructed images were also provided.    CLINICAL HISTORY:  Pulmonary embolism (PE) suspected, positive D-dimer  soa    FINDINGS:  The pulmonary arteries are well-opacified and there is no  filling defect to indicate pulmonary embolism. The thoracic  aorta is normal.   There are small bilateral pleural effusions  and interstitial pulmonary edema.  There is mild mediastinal  adenopathy also likely due to edema.  There is no significant  osseous abnormality.      Impression:      No pulmonary embolism.  Interstitial pulmonary edema with small  pleural effusions.    Authenticated and Electronically Signed by Semaj Marshall M.D. on  03/21/2023 08:54:31 PM          Echo:    Results for orders placed during the hospital encounter of 03/21/23    Adult Transthoracic Echo Complete With Contrast if Necessary Per Protocol    Interpretation Summary  1.  Normal left ventricular size and systolic function, LVEF 65-70%.  2.  Mild concentric LVH.  3.  Normal LV diastolic filling pattern.  4.  Normal right ventricular size and systolic function.  5.  Normal left atrial volume index.  6.  No significant valvular abnormalities.    Condition on Discharge:      Stable.    Code status during the hospital stay:    Code Status and Medical Interventions:   Ordered at: 03/21/23 2232     Code Status (Patient has no pulse and is not breathing):    CPR (Attempt to Resuscitate)     Medical Interventions (Patient has pulse or is breathing):    Full Support     Discharge Disposition:    Home or Self Care    Discharge Medications:       Discharge Medications      New Medications      Instructions Start Date   budesonide-formoterol 160-4.5 MCG/ACT inhaler  Commonly known as: SYMBICORT   2 puffs, Inhalation, 2 Times Daily - RT      furosemide 40 MG tablet  Commonly known as: Lasix   40 mg, Oral, Daily         Continue These Medications      Instructions Start Date   amLODIPine 2.5 MG tablet  Commonly known as: NORVASC   2.5 mg, Oral, Nightly      ARIPiprazole 2 MG tablet  Commonly known as: ABILIFY   2 mg, Oral, Daily      escitalopram 20 MG tablet  Commonly known as: LEXAPRO   20 mg, Oral, Daily      guaiFENesin 200 MG tablet   400 mg, Oral, 2 Times Daily      ibuprofen 400 MG tablet  Commonly known as: ADVIL,MOTRIN    800 mg, Oral, 2 Times Daily PRN      lisinopril 10 MG tablet  Commonly known as: PRINIVIL,ZESTRIL   10 mg, Oral, Nightly      Magnesium Oxide 400 (240 Mg) MG tablet   400 mg, Oral, 2 Times Daily      omeprazole 40 MG capsule  Commonly known as: priLOSEC   40 mg, Oral, Nightly      pramipexole 1 MG tablet  Commonly known as: MIRAPEX   1 mg, Oral, 2 Times Daily PRN      rOPINIRole 1 MG tablet  Commonly known as: REQUIP   1 mg, Oral, 2 Times Daily PRN, Take 1 hour before bedtime.           Discharge Diet:     Diet Instructions     Diet: Cardiac Diets; Healthy Heart (2-3 Na+); Texture: Regular Texture (IDDSI 7); Fluid Consistency: Thin (IDDSI 0)      Discharge Diet: Cardiac Diets    Cardiac Diet: Healthy Heart (2-3 Na+)    Texture: Regular Texture (IDDSI 7)    Fluid Consistency: Thin (IDDSI 0)        Activity at Discharge:     Activity Instructions     Activity as Tolerated          Follow-up Appointments:     Contact information for follow-up providers     Provider, No Known Follow up in 3 day(s).    Why: before DC, please schedule with new [facility]hospital follow up for early next week  Contact information:  [facility] [address]  [phone]                   Contact information for after-discharge care     Durable Medical Equipment     [facility] .    Service: Durable Medical Equipment  Contact information:  [facility] [address]  [facility] [address]  [phone]                           No future appointments.  Test Results Pending at Discharge:           Brian Joseph Kerley, DO  03/24/23  08:08 EDT    Time: I spent 32 minutes on this discharge activity which included: face-to-face encounter with the patient, reviewing the data in the system, coordination of the care with the nursing staff as well as consultants, documentation, and entering orders.     Dictated utilizing Dragon dictation.

## 2023-03-24 NOTE — CASE MANAGEMENT/SOCIAL WORK
Continued Stay Note   [facility]     Patient Name: Claudia Stokes  MRN: [id number]  Today's Date: 3/24/2023    Admit Date: 3/21/2023    Plan: spoke with [facility]; stated they received order for rollator and will deliver to home, may be a few days   Discharge Plan     Row Name 03/24/23 0849       Plan    Plan spoke with [facility]; stated they received order for rollator and will deliver to home, may be a few days  10:50 EDT  Spoke with pt in room; stated ready to go home; pt stated she already spoke with [facility] related to rollator ;  imm completed; stated son brought oxygen for transport home               Discharge Codes    No documentation.               Expected Discharge Date and Time     Expected Discharge Date Expected Discharge Time    Mar 24, 2023             Iliana Gao RN

## 2023-03-25 NOTE — PAYOR COMM NOTE
"Deisi Alvarez (73 y.o. Female)     Date of Birth   1950    Social Security Number       Address   [address] [place], [place] [id number]    Home Phone   [phone]    MRN   [id number]       Spiritism   None    Marital Status   Single                            Admission Date   3/21/23    Admission Type   Emergency    Admitting Provider   Bird Fu DO    Attending Provider       Department, Room/Bed   [facility] TELEMETRY 3, 320/1       Discharge Date   3/24/2023    Discharge Disposition   Home or Self Care    Discharge Destination                               Attending Provider: (none)   Allergies: Codeine, Sulfa Antibiotics, Tetracyclines & Related    Isolation: None   Infection: None   Code Status: Prior    Ht: 160 cm (62.99\")   Wt: 116 kg (255 lb 1.2 oz)    Admission Cmt: None   Principal Problem: Acute on chronic respiratory failure with hypoxia and hypercapnia (HCC) [J96.21,J96.22]                 Active Insurance as of 3/21/2023     Primary Coverage     Payor Plan Insurance Group Employer/Plan Group    ANTH MEDICARE REPLACEMENT [facility] MEDICARE ADVANTAGE [id number]     Payor Plan Address Payor Plan Phone Number Payor Plan Fax Number Effective Dates    [address] [phone]  2022 - None Entered    [facility] [id number]       Subscriber Name Subscriber Birth Date Member ID       DEISI ALVAREZ 1950 [id number]                 Emergency Contacts      (Rel.) Home Phone Work Phone Mobile Phone    DES DUVAL (Daughter) [phone] -- --    Rodrigo Chong (Son) -- -- [phone]               Discharge Summary      Kerley, Brian Joseph, DO at 23 0808              [facility] HOSPITALIST   DISCHARGE SUMMARY      Name:  Deisi Alvarez   Age:  73 y.o.  Sex:  female  :  1950  MRN:  [id number]   Visit Number:  [id number]    Admission Date:  3/21/2023  Date of Discharge:  3/24/2023  Primary Care Physician:  Provider, No " Known    Important issues to note:    -Acute on chronic respiratory failure resolved, back to 3 L baseline.  Did require BiPAP upon admission with stepwise consistent titration down thereafter.  Improved with IV diuresis.  She had about 2.5 L removed.  -Echocardiogram 3/22; EF 65-70%, mild concentric LVH, normal diastolic filling pattern, otherwise insignificant.  No evidence of structural heart failure, do suspect the patient's volume overload partly due to consciously drinking significantly more water for months.    -Will establish care with new PCP as she is new to this area.  A1c 5.8%.  -Provided Symbicort.  -Providing short-term Lasix.  Discussed caution regarding frequent use of Lasix not supervised by PCP.    Discharge Diagnoses:     Volume overload, resolved  Acute on chronic respiratory failure with hypoxia, resolved  Chronic respiratory failure with hypoxia  COPD      Problem List:     Active Hospital Problems    Diagnosis  POA   • Impaired mobility and ADLs [Z74.09, Z78.9]  Yes      Resolved Hospital Problems    Diagnosis Date Resolved POA   • **Acute on chronic respiratory failure with hypoxia and hypercapnia (HCC) [J96.21, J96.22] 03/24/2023 Yes   • COPD with acute exacerbation (HCC) [J44.1] 03/24/2023 Yes   • Acute congestive heart failure (HCC) [I50.9] 03/24/2023 Yes   • Acute-on-chronic respiratory failure (HCC) [J96.20] 03/24/2023 Yes     Presenting Problem:    Chief Complaint   Patient presents with   • Shortness of Breath      Consults:     Consulting Physician(s)             None          Procedures Performed:    None    History of presenting illness/Hospital Course:    Claudia Stokes is a 73-year-old lady new to the area after moving from [place] to [place] and then now to [place] history of COPD with history of exacerbations with 2 to 3 L baseline oxygen requirement, hypertension, mood disorder, GERD, anemia.  Reports was in the hospital 4 months ago with flu and pneumonia.  Also  reports was treated outpatient for slight pneumonia with Levaquin and Decadron 2 months ago.  Reports chronic lower extremity edema and a heart murmur but reports she has never had an echo.  Reports that her breathing had actually been doing better up until about 3 days ago.  Also her chronic cough had improved.  However over that period of time its worse and is well.  Consider dry nonproductive cough.  No fevers reported.  In the emergency department it was reported that the patient had required BiPAP and by the time of admission was on a nonrebreather mask.  She elects to be full code.  Declines advanced directives.    Pertinent findings: pH 7.327, PCO2 67.3, bicarb 29.1, alkaline phosphatase 147, CRP 6.07, COVID and flu negative, chest x-ray shows a bilateral predominantly basilar interstitial haziness with bibasilar effusions left greater than right without focal consolidation, CT of the chest shows moderate to severe emphysema bilateral effusions, bilateral groundglass opacities, multiple reticulonodular which may be chronic or related to acute disease process would recommend repeat CT to follow-up on this in 4 weeks.    Inpatient general floor admission 3/21/2023 with acute on chronic respiratory failure with hypoxia and hypercapnia, 2-3 L baseline, secondary to COPD exacerbation and acute congestive heart failure, initially requiring BiPAP and quickly able to be transitioned to nonrebreather.  Able to be weaned down to 6 L by following morning.  Refused nighttime BiPAP.  By 3/24 stable on her baseline 3 L.    -Acute on chronic respiratory failure resolved, back to 3 L baseline.  Did require BiPAP upon admission with stepwise consistent titration down thereafter.  Improved with IV diuresis.  She had about 2.5 L removed.  -Echocardiogram 3/22; EF 65-70%, mild concentric LVH, normal diastolic filling pattern, otherwise insignificant.  No evidence of structural heart failure, do suspect the patient's volume  overload partly due to consciously drinking significantly more water for months.    -Will establish care with new PCP as she is new to this area.  A1c 5.8%.  -Providing short-term Lasix.  Discussed caution regarding frequent use of Lasix not supervised by PCP.    Vital Signs:    Temp:  [97 °F (36.1 °C)-98 °F (36.7 °C)] 97.6 °F (36.4 °C)  Heart Rate:  [] 93  Resp:  [17-18] 18  BP: (144-161)/(60-74) 157/73    Physical Exam:    General Appearance:  Alert and cooperative.    Head:  Atraumatic and normocephalic.   Eyes: Conjunctivae and sclerae normal, no icterus. No pallor.   Ears:  Ears with no abnormalities noted.   Throat: No oral lesions, no thrush, oral mucosa moist.   Neck: Supple, trachea midline, no thyromegaly.   Back:   No kyphoscoliosis present. No tenderness to palpation.   Lungs:    Moderately diminished all fields, slight expiratory wheeze in all fields   Heart:  Normal S1 and S2, no murmur, no gallop, no rub. No JVD.   Abdomen:   Normal bowel sounds, no masses, no organomegaly. Soft, nontender, nondistended, no rebound tenderness.   Extremities: Supple, no edema, no cyanosis, no clubbing.   Pulses: Pulses palpable bilaterally.   Skin:  Warm   Neurologic: Alert and oriented x 3. No facial asymmetry. Moves all four limbs. No tremors.     Pertinent Lab Results:     Results from last 7 days   Lab Units 03/24/23  0551 03/23/23  0918 03/22/23  0552 03/21/23  1716   SODIUM mmol/L 140 138 137 137   POTASSIUM mmol/L 4.0 4.0 5.2 4.9   CHLORIDE mmol/L 92* 91* 94* 97*   CO2 mmol/L 38.6* 36.7* 33.2* 29.1*   BUN mg/dL 29* 20 12 7*   CREATININE mg/dL 0.97 0.90 0.84 0.69   CALCIUM mg/dL 9.5 9.6 9.1 9.4   BILIRUBIN mg/dL  --   --  0.3 0.4   ALK PHOS U/L  --   --  142* 147*   ALT (SGPT) U/L  --   --  20 23   AST (SGOT) U/L  --   --  20 25   GLUCOSE mg/dL 114* 199* 237* 116*     Results from last 7 days   Lab Units 03/22/23  0552 03/21/23  1716   WBC 10*3/mm3 6.82 9.49   HEMOGLOBIN g/dL 9.9* 10.8*   HEMATOCRIT % 34.2  36.2   PLATELETS 10*3/mm3 299 301         Results from last 7 days   Lab Units 03/21/23  1942 03/21/23  1716   HSTROP T ng/L <6 <6  <6     Results from last 7 days   Lab Units 03/21/23  1716   PROBNP pg/mL 258.9             Results from last 7 days   Lab Units 03/21/23  1723   PH, ARTERIAL pH units 7.327*   PO2 ART mm Hg 76.6   PCO2, ARTERIAL mm Hg 67.3*   HCO3 ART mmol/L 35.2*           Pertinent Radiology Results:    Imaging Results (All)     Procedure Component Value Units Date/Time    XR Chest 1 View [[id number]] Collected: 03/22/23 1125     Updated: 03/22/23 1203    Narrative:      PROCEDURE: XR CHEST 1 VW-     INDICATION:  SOB     FINDINGS:  A portable view of the chest was obtained.  There is no prior  exam for comparison.  The heart is enlarged. There are bilateral  perihilar and bibasilar opacities with small bilateral pleural  effusions. No pneumothorax.       Impression:      Bilateral opacities and pleural effusions. Favor pulmonary  edema. Pneumonia not excluded. Recommend radiographic followup.     This report was signed and finalized on 3/22/2023 12:01 PM by Aida Rosales MD.    CT Angiogram Chest Pulmonary Embolism [[id number]] Collected: 03/21/23 2054     Updated: 03/21/23 2055    Narrative:      FINAL REPORT    TECHNIQUE:  Thin section axial images were obtained through the chest and  during the arterial phase of IV contrast administration. Coronal  3-D MIP reconstructed images were also provided.    CLINICAL HISTORY:  Pulmonary embolism (PE) suspected, positive D-dimer  soa    FINDINGS:  The pulmonary arteries are well-opacified and there is no  filling defect to indicate pulmonary embolism. The thoracic  aorta is normal.  There are small bilateral pleural effusions  and interstitial pulmonary edema.  There is mild mediastinal  adenopathy also likely due to edema.  There is no significant  osseous abnormality.      Impression:      No pulmonary embolism.  Interstitial pulmonary edema with  small  pleural effusions.    Authenticated and Electronically Signed by Semaj Marshall M.D. on  03/21/2023 08:54:31 PM          Echo:    Results for orders placed during the hospital encounter of 03/21/23    Adult Transthoracic Echo Complete With Contrast if Necessary Per Protocol    Interpretation Summary  1.  Normal left ventricular size and systolic function, LVEF 65-70%.  2.  Mild concentric LVH.  3.  Normal LV diastolic filling pattern.  4.  Normal right ventricular size and systolic function.  5.  Normal left atrial volume index.  6.  No significant valvular abnormalities.    Condition on Discharge:      Stable.    Code status during the hospital stay:    Code Status and Medical Interventions:   Ordered at: 03/21/23 2232     Code Status (Patient has no pulse and is not breathing):    CPR (Attempt to Resuscitate)     Medical Interventions (Patient has pulse or is breathing):    Full Support     Discharge Disposition:    Home or Self Care    Discharge Medications:       Discharge Medications      New Medications      Instructions Start Date   budesonide-formoterol 160-4.5 MCG/ACT inhaler  Commonly known as: SYMBICORT   2 puffs, Inhalation, 2 Times Daily - RT      furosemide 40 MG tablet  Commonly known as: Lasix   40 mg, Oral, Daily         Continue These Medications      Instructions Start Date   amLODIPine 2.5 MG tablet  Commonly known as: NORVASC   2.5 mg, Oral, Nightly      ARIPiprazole 2 MG tablet  Commonly known as: ABILIFY   2 mg, Oral, Daily      escitalopram 20 MG tablet  Commonly known as: LEXAPRO   20 mg, Oral, Daily      guaiFENesin 200 MG tablet   400 mg, Oral, 2 Times Daily      ibuprofen 400 MG tablet  Commonly known as: ADVIL,MOTRIN   800 mg, Oral, 2 Times Daily PRN      lisinopril 10 MG tablet  Commonly known as: PRINIVIL,ZESTRIL   10 mg, Oral, Nightly      Magnesium Oxide 400 (240 Mg) MG tablet   400 mg, Oral, 2 Times Daily      omeprazole 40 MG capsule  Commonly known as: priLOSEC   40 mg, Oral,  Nightly      pramipexole 1 MG tablet  Commonly known as: MIRAPEX   1 mg, Oral, 2 Times Daily PRN      rOPINIRole 1 MG tablet  Commonly known as: REQUIP   1 mg, Oral, 2 Times Daily PRN, Take 1 hour before bedtime.           Discharge Diet:     Diet Instructions     Diet: Cardiac Diets; Healthy Heart (2-3 Na+); Texture: Regular Texture (IDDSI 7); Fluid Consistency: Thin (IDDSI 0)      Discharge Diet: Cardiac Diets    Cardiac Diet: Healthy Heart (2-3 Na+)    Texture: Regular Texture (IDDSI 7)    Fluid Consistency: Thin (IDDSI 0)        Activity at Discharge:     Activity Instructions     Activity as Tolerated          Follow-up Appointments:     Contact information for follow-up providers     Provider, No Known Follow up in 3 day(s).    Why: before DC, please schedule with new [facility]/hospital follow up for early next week  Contact information:  [facility] [address]  [phone]                   Contact information for after-discharge care     Durable Medical Equipment     [facility] .    Service: Durable Medical Equipment  Contact information:  [address] [place], [place] [address] D[address]on [address] [place] [id number]  [phone]                           No future appointments.  Test Results Pending at Discharge:           Brian Joseph Kerley, DO  03/24/23  08:08 EDT    Time: I spent 32 minutes on this discharge activity which included: face-to-face encounter with the patient, reviewing the data in the system, coordination of the care with the nursing staff as well as consultants, documentation, and entering orders.     Dictated utilizing Dragon dictation.      Electronically signed by Kerley, Brian Joseph, DO at 03/24/23 2577

## 2023-03-25 NOTE — CASE MANAGEMENT/SOCIAL WORK
Case Management Discharge Note                Selected Continued Care - Discharged on 3/24/2023 Admission date: 3/21/2023 - Discharge disposition: Home or Self Care    Destination    No services have been selected for the patient.              Durable Medical Equipment Coordination complete.    Service Provider Selected Services Address Phone Fax Patient Preferred    [facility] [place] [facility] [address] [id number] [phone] --       Internal Comment last updated by Rosenda Vega RN 3/23/2023 1430    Rollator walker  Pt utilized [facility] in [place]/service transfer to [place]                     Dialysis/Infusion    No services have been selected for the patient.              Home Medical Care    No services have been selected for the patient.              Therapy    No services have been selected for the patient.              Community Resources    No services have been selected for the patient.              Community & DME    No services have been selected for the patient.                  Transportation Services  Private: Car    Final Discharge Disposition Code: 01 - home or self-care

## 2023-03-27 ENCOUNTER — TELEPHONE (OUTPATIENT)
Dept: INTERNAL MEDICINE | Facility: CLINIC | Age: 73
End: 2023-03-27

## 2023-03-27 ENCOUNTER — TRANSITIONAL CARE MANAGEMENT TELEPHONE ENCOUNTER (OUTPATIENT)
Dept: CALL CENTER | Facility: HOSPITAL | Age: 73
End: 2023-03-27
Payer: MEDICARE

## 2023-03-27 NOTE — PAYOR COMM NOTE
"To:  Marley  From: Kimberly Blas RN  Phone: [phone]  Fax: [phone]  NPI: [id number]  TIN: [id number]  Member ID: [id number]  MRN: [id number]    Deisi Alvarez (73 y.o. Female)     Date of Birth   1950    Social Security Number       Address   [address] [place], [place] [id number]    Home Phone   [phone]    MRN   [id number]       Religious   None    Marital Status   Single                            Admission Date   3/21/23    Admission Type   Emergency    Admitting Provider   Bird Fu DO    Attending Provider       Department, Room/Bed   [facility] TELEMETRY 3, 320/       Discharge Date   3/24/2023    Discharge Disposition   Home or Self Care    Discharge Destination                               Attending Provider: (none)   Allergies: Codeine, Sulfa Antibiotics, Tetracyclines & Related    Isolation: None   Infection: None   Code Status: Prior    Ht: 160 cm (62.99\")   Wt: 116 kg (255 lb 1.2 oz)    Admission Cmt: None   Principal Problem: Acute on chronic respiratory failure with hypoxia and hypercapnia (HCC) [J96.21,J96.22]                 Active Insurance as of 3/21/2023     Primary Coverage     Payor Plan Insurance Group Employer/Plan Group    ANTH MEDICARE REPLACEMENT ANTH MEDICARE ADVANTAGE [id number]     Payor Plan Address Payor Plan Phone Number Payor Plan Fax Number Effective Dates    [address] [phone]  2022 - None Entered    [facility] [address]       Subscriber Name Subscriber Birth Date Member ID       DEISI ALVAREZ 1950 [id number]                 Emergency Contacts      (Rel.) Home Phone Work Phone Mobile Phone    DES DUVAL (Daughter) [phone] -- --    Rodrigo Chong (Son) -- -- [phone]               Discharge Summary      Kerley, Brian Joseph, DO at 23 0808              [facility] HOSPITALIST   DISCHARGE SUMMARY      Name:  Deisi Alvarez   Age:  73 y.o.  Sex:  female  :  1950  MRN: "  [id number]   Visit Number:  [id number]    Admission Date:  3/21/2023  Date of Discharge:  3/24/2023  Primary Care Physician:  Provider, No Known    Important issues to note:    -Acute on chronic respiratory failure resolved, back to 3 L baseline.  Did require BiPAP upon admission with stepwise consistent titration down thereafter.  Improved with IV diuresis.  She had about 2.5 L removed.  -Echocardiogram 3/22; EF 65-70%, mild concentric LVH, normal diastolic filling pattern, otherwise insignificant.  No evidence of structural heart failure, do suspect the patient's volume overload partly due to consciously drinking significantly more water for months.    -Will establish care with new PCP as she is new to this area.  A1c 5.8%.  -Provided Symbicort.  -Providing short-term Lasix.  Discussed caution regarding frequent use of Lasix not supervised by PCP.    Discharge Diagnoses:     Volume overload, resolved  Acute on chronic respiratory failure with hypoxia, resolved  Chronic respiratory failure with hypoxia  COPD      Problem List:     Active Hospital Problems    Diagnosis  POA   • Impaired mobility and ADLs [Z74.09, Z78.9]  Yes      Resolved Hospital Problems    Diagnosis Date Resolved POA   • **Acute on chronic respiratory failure with hypoxia and hypercapnia (HCC) [J96.21, J96.22] 03/24/2023 Yes   • COPD with acute exacerbation (HCC) [J44.1] 03/24/2023 Yes   • Acute congestive heart failure (HCC) [I50.9] 03/24/2023 Yes   • Acute-on-chronic respiratory failure (HCC) [J96.20] 03/24/2023 Yes     Presenting Problem:    Chief Complaint   Patient presents with   • Shortness of Breath      Consults:     Consulting Physician(s)             None          Procedures Performed:    None    History of presenting illness/Hospital Course:    Claudia Stokes is a 73-year-old lady new to the area after moving from [place] to [place] and then now to [place] history of COPD with history of exacerbations with 2 to 3 L baseline  oxygen requirement, hypertension, mood disorder, GERD, anemia.  Reports was in the hospital 4 months ago with flu and pneumonia.  Also reports was treated outpatient for slight pneumonia with Levaquin and Decadron 2 months ago.  Reports chronic lower extremity edema and a heart murmur but reports she has never had an echo.  Reports that her breathing had actually been doing better up until about 3 days ago.  Also her chronic cough had improved.  However over that period of time its worse and is well.  Consider dry nonproductive cough.  No fevers reported.  In the emergency department it was reported that the patient had required BiPAP and by the time of admission was on a nonrebreather mask.  She elects to be full code.  Declines advanced directives.    Pertinent findings: pH 7.327, PCO2 67.3, bicarb 29.1, alkaline phosphatase 147, CRP 6.07, COVID and flu negative, chest x-ray shows a bilateral predominantly basilar interstitial haziness with bibasilar effusions left greater than right without focal consolidation, CT of the chest shows moderate to severe emphysema bilateral effusions, bilateral groundglass opacities, multiple reticulonodular which may be chronic or related to acute disease process would recommend repeat CT to follow-up on this in 4 weeks.    Inpatient general floor admission 3/21/2023 with acute on chronic respiratory failure with hypoxia and hypercapnia, 2-3 L baseline, secondary to COPD exacerbation and acute congestive heart failure, initially requiring BiPAP and quickly able to be transitioned to nonrebreather.  Able to be weaned down to 6 L by following morning.  Refused nighttime BiPAP.  By 3/24 stable on her baseline 3 L.    -Acute on chronic respiratory failure resolved, back to 3 L baseline.  Did require BiPAP upon admission with stepwise consistent titration down thereafter.  Improved with IV diuresis.  She had about 2.5 L removed.  -Echocardiogram 3/22; EF 65-70%, mild concentric LVH,  normal diastolic filling pattern, otherwise insignificant.  No evidence of structural heart failure, do suspect the patient's volume overload partly due to consciously drinking significantly more water for months.    -Will establish care with new PCP as she is new to this area.  A1c 5.8%.  -Providing short-term Lasix.  Discussed caution regarding frequent use of Lasix not supervised by PCP.    Vital Signs:    Temp:  [97 °F (36.1 °C)-98 °F (36.7 °C)] 97.6 °F (36.4 °C)  Heart Rate:  [] 93  Resp:  [17-18] 18  BP: (144-161)/(60-74) 157/73    Physical Exam:    General Appearance:  Alert and cooperative.    Head:  Atraumatic and normocephalic.   Eyes: Conjunctivae and sclerae normal, no icterus. No pallor.   Ears:  Ears with no abnormalities noted.   Throat: No oral lesions, no thrush, oral mucosa moist.   Neck: Supple, trachea midline, no thyromegaly.   Back:   No kyphoscoliosis present. No tenderness to palpation.   Lungs:    Moderately diminished all fields, slight expiratory wheeze in all fields   Heart:  Normal S1 and S2, no murmur, no gallop, no rub. No JVD.   Abdomen:   Normal bowel sounds, no masses, no organomegaly. Soft, nontender, nondistended, no rebound tenderness.   Extremities: Supple, no edema, no cyanosis, no clubbing.   Pulses: Pulses palpable bilaterally.   Skin:  Warm   Neurologic: Alert and oriented x 3. No facial asymmetry. Moves all four limbs. No tremors.     Pertinent Lab Results:     Results from last 7 days   Lab Units 03/24/23  0551 03/23/23  0918 03/22/23  0552 03/21/23  1716   SODIUM mmol/L 140 138 137 137   POTASSIUM mmol/L 4.0 4.0 5.2 4.9   CHLORIDE mmol/L 92* 91* 94* 97*   CO2 mmol/L 38.6* 36.7* 33.2* 29.1*   BUN mg/dL 29* 20 12 7*   CREATININE mg/dL 0.97 0.90 0.84 0.69   CALCIUM mg/dL 9.5 9.6 9.1 9.4   BILIRUBIN mg/dL  --   --  0.3 0.4   ALK PHOS U/L  --   --  142* 147*   ALT (SGPT) U/L  --   --  20 23   AST (SGOT) U/L  --   --  20 25   GLUCOSE mg/dL 114* 199* 237* 116*     Results  from last 7 days   Lab Units 03/22/23  0552 03/21/23  1716   WBC 10*3/mm3 6.82 9.49   HEMOGLOBIN g/dL 9.9* 10.8*   HEMATOCRIT % 34.2 36.2   PLATELETS 10*3/mm3 299 301         Results from last 7 days   Lab Units 03/21/23  1942 03/21/23  1716   HSTROP T ng/L <6 <6  <6     Results from last 7 days   Lab Units 03/21/23  1716   PROBNP pg/mL 258.9             Results from last 7 days   Lab Units 03/21/23  1723   PH, ARTERIAL pH units 7.327*   PO2 ART mm Hg 76.6   PCO2, ARTERIAL mm Hg 67.3*   HCO3 ART mmol/L 35.2*           Pertinent Radiology Results:    Imaging Results (All)     Procedure Component Value Units Date/Time    XR Chest 1 View [[id number]] Collected: 03/22/23 1125     Updated: 03/22/23 1203    Narrative:      PROCEDURE: XR CHEST 1 VW-     INDICATION:  SOB     FINDINGS:  A portable view of the chest was obtained.  There is no prior  exam for comparison.  The heart is enlarged. There are bilateral  perihilar and bibasilar opacities with small bilateral pleural  effusions. No pneumothorax.       Impression:      Bilateral opacities and pleural effusions. Favor pulmonary  edema. Pneumonia not excluded. Recommend radiographic followup.     This report was signed and finalized on 3/22/2023 12:01 PM by Aida Rosales MD.    CT Angiogram Chest Pulmonary Embolism [[id number]] Collected: 03/21/23 2054     Updated: 03/21/23 2055    Narrative:      FINAL REPORT    TECHNIQUE:  Thin section axial images were obtained through the chest and  during the arterial phase of IV contrast administration. Coronal  3-D MIP reconstructed images were also provided.    CLINICAL HISTORY:  Pulmonary embolism (PE) suspected, positive D-dimer  soa    FINDINGS:  The pulmonary arteries are well-opacified and there is no  filling defect to indicate pulmonary embolism. The thoracic  aorta is normal.  There are small bilateral pleural effusions  and interstitial pulmonary edema.  There is mild mediastinal  adenopathy also likely due to edema.   There is no significant  osseous abnormality.      Impression:      No pulmonary embolism.  Interstitial pulmonary edema with small  pleural effusions.    Authenticated and Electronically Signed by Semaj Marshall M.D. on  03/21/2023 08:54:31 PM          Echo:    Results for orders placed during the hospital encounter of 03/21/23    Adult Transthoracic Echo Complete With Contrast if Necessary Per Protocol    Interpretation Summary  1.  Normal left ventricular size and systolic function, LVEF 65-70%.  2.  Mild concentric LVH.  3.  Normal LV diastolic filling pattern.  4.  Normal right ventricular size and systolic function.  5.  Normal left atrial volume index.  6.  No significant valvular abnormalities.    Condition on Discharge:      Stable.    Code status during the hospital stay:    Code Status and Medical Interventions:   Ordered at: 03/21/23 2232     Code Status (Patient has no pulse and is not breathing):    CPR (Attempt to Resuscitate)     Medical Interventions (Patient has pulse or is breathing):    Full Support     Discharge Disposition:    Home or Self Care    Discharge Medications:       Discharge Medications      New Medications      Instructions Start Date   budesonide-formoterol 160-4.5 MCG/ACT inhaler  Commonly known as: SYMBICORT   2 puffs, Inhalation, 2 Times Daily - RT      furosemide 40 MG tablet  Commonly known as: Lasix   40 mg, Oral, Daily         Continue These Medications      Instructions Start Date   amLODIPine 2.5 MG tablet  Commonly known as: NORVASC   2.5 mg, Oral, Nightly      ARIPiprazole 2 MG tablet  Commonly known as: ABILIFY   2 mg, Oral, Daily      escitalopram 20 MG tablet  Commonly known as: LEXAPRO   20 mg, Oral, Daily      guaiFENesin 200 MG tablet   400 mg, Oral, 2 Times Daily      ibuprofen 400 MG tablet  Commonly known as: ADVIL,MOTRIN   800 mg, Oral, 2 Times Daily PRN      lisinopril 10 MG tablet  Commonly known as: PRINIVIL,ZESTRIL   10 mg, Oral, Nightly      Magnesium Oxide 400  (240 Mg) MG tablet   400 mg, Oral, 2 Times Daily      omeprazole 40 MG capsule  Commonly known as: priLOSEC   40 mg, Oral, Nightly      pramipexole 1 MG tablet  Commonly known as: MIRAPEX   1 mg, Oral, 2 Times Daily PRN      rOPINIRole 1 MG tablet  Commonly known as: REQUIP   1 mg, Oral, 2 Times Daily PRN, Take 1 hour before bedtime.           Discharge Diet:     Diet Instructions     Diet: Cardiac Diets; Healthy Heart (2-3 Na+); Texture: Regular Texture (IDDSI 7); Fluid Consistency: Thin (IDDSI 0)      Discharge Diet: Cardiac Diets    Cardiac Diet: Healthy Heart (2-3 Na+)    Texture: Regular Texture (IDDSI 7)    Fluid Consistency: Thin (IDDSI 0)        Activity at Discharge:     Activity Instructions     Activity as Tolerated          Follow-up Appointments:     Contact information for follow-up providers     Provider, No Known Follow up in 3 day(s).    Why: before DC, please schedule with new [facility]/hospital follow up for early next week  Contact information:  [facility] [address]  [phone]                   Contact information for after-discharge care     Durable Medical Equipment     [facility] .    Service: Durable Medical Equipment  Contact information:  [address] [place] [address] Dr Dickson [address] [place] [address]  [phone]                           No future appointments.  Test Results Pending at Discharge:           Brian Joseph Kerley, DO  03/24/23  08:08 EDT    Time: I spent 32 minutes on this discharge activity which included: face-to-face encounter with the patient, reviewing the data in the system, coordination of the care with the nursing staff as well as consultants, documentation, and entering orders.     Dictated utilizing Dragon dictation.      Electronically signed by Kerley, Brian Joseph, DO at 03/24/23 8102

## 2023-03-27 NOTE — OUTREACH NOTE
Call Center TCM Note    Flowsheet Row Responses   [facility] patient discharged from? [facility]   Does the patient have one of the following disease processes/diagnoses(primary or secondary)? Other   TCM attempt successful? Yes   Call start time 0945   Call end time 0947   Discharge diagnosis Acute on chronic respiratory failure with hypoxia and hypercapnia    Meds reviewed with patient/caregiver? Yes   Is the patient having any side effects they believe may be caused by any medication additions or changes? No   Does the patient have all medications ordered at discharge? Yes   Is the patient taking all medications as directed (includes completed medication regime)? Yes   Does the patient have an appointment with their PCP within 7 days of discharge? Yes   Has home health visited the patient within 72 hours of discharge? N/A   What DME was ordered? walker--[employer]   Has all DME been delivered? Yes   Psychosocial issues? No   Did the patient receive a copy of their discharge instructions? Yes   Nursing interventions Reviewed instructions with patient   What is the patient's perception of their health status since discharge? Improving   Is the patient/caregiver able to teach back signs and symptoms related to disease process for when to call PCP? Yes   Is the patient/caregiver able to teach back signs and symptoms related to disease process for when to call 911? Yes   Is the patient/caregiver able to teach back the hierarchy of who to call/visit for symptoms/problems? PCP, Specialist, Home health nurse, Urgent Care, ED, 911 Yes   If the patient is a current smoker, are they able to teach back resources for cessation? Not a smoker   TCM call completed? Yes   Call end time 0947   Would this patient benefit from a Referral to Amb Social Work? No   Is the patient interested in additional calls from an ambulatory ?  NOTE:  applies to high risk patients requiring additional follow-up. Beba ELLINGTON  JESSICA South    3/27/2023, 09:48 EDT

## 2023-03-27 NOTE — TELEPHONE ENCOUNTER
PATIENT IS REQUESTING THE NEW PATIENT PAPERWORK TO BE MAILED TO HER     DEISI ALVAREZ   [address] [facility] [address]

## 2023-04-04 ENCOUNTER — READMISSION MANAGEMENT (OUTPATIENT)
Dept: CALL CENTER | Facility: HOSPITAL | Age: 73
End: 2023-04-04
Payer: MEDICARE

## 2023-04-04 NOTE — OUTREACH NOTE
Medical Week 2 Survey    Flowsheet Row Responses   [facility] facility patient discharged from? [facility]   Does the patient have one of the following disease processes/diagnoses(primary or secondary)? Other   Week 2 attempt successful? No   Unsuccessful attempts Attempt 1          DSE WATSON - Registered Nurse

## 2023-04-11 ENCOUNTER — OFFICE VISIT (OUTPATIENT)
Dept: INTERNAL MEDICINE | Facility: CLINIC | Age: 73
End: 2023-04-11
Payer: MEDICARE

## 2023-04-11 VITALS
BODY MASS INDEX: 45.54 KG/M2 | DIASTOLIC BLOOD PRESSURE: 82 MMHG | OXYGEN SATURATION: 91 % | SYSTOLIC BLOOD PRESSURE: 132 MMHG | TEMPERATURE: 97.6 F | HEIGHT: 63 IN | HEART RATE: 82 BPM | WEIGHT: 257 LBS

## 2023-04-11 DIAGNOSIS — I10 PRIMARY HYPERTENSION: Primary | ICD-10-CM

## 2023-04-11 DIAGNOSIS — J43.8 OTHER EMPHYSEMA: ICD-10-CM

## 2023-04-11 DIAGNOSIS — M19.90 ARTHRITIS PAIN: ICD-10-CM

## 2023-04-11 DIAGNOSIS — Z74.09 IMPAIRED MOBILITY AND ADLS: Chronic | ICD-10-CM

## 2023-04-11 DIAGNOSIS — J84.9 INTERSTITIAL LUNG DISEASE: ICD-10-CM

## 2023-04-11 DIAGNOSIS — R60.0 BILATERAL LEG EDEMA: ICD-10-CM

## 2023-04-11 DIAGNOSIS — Z87.891 HISTORY OF TOBACCO USE, PRESENTING HAZARDS TO HEALTH: ICD-10-CM

## 2023-04-11 DIAGNOSIS — Z78.9 IMPAIRED MOBILITY AND ADLS: Chronic | ICD-10-CM

## 2023-04-11 DIAGNOSIS — G25.81 RLS (RESTLESS LEGS SYNDROME): ICD-10-CM

## 2023-04-11 DIAGNOSIS — K21.00 GASTROESOPHAGEAL REFLUX DISEASE WITH ESOPHAGITIS WITHOUT HEMORRHAGE: ICD-10-CM

## 2023-04-11 DIAGNOSIS — D64.9 ANEMIA, UNSPECIFIED TYPE: ICD-10-CM

## 2023-04-11 DIAGNOSIS — F32.1 CURRENT MODERATE EPISODE OF MAJOR DEPRESSIVE DISORDER WITHOUT PRIOR EPISODE: ICD-10-CM

## 2023-04-11 RX ORDER — ALBUTEROL SULFATE 1.25 MG/3ML
SOLUTION RESPIRATORY (INHALATION)
COMMUNITY
Start: 2023-02-18

## 2023-04-11 RX ORDER — BUPROPION HYDROCHLORIDE 150 MG/1
150 TABLET ORAL DAILY
Qty: 30 TABLET | Refills: 1 | Status: SHIPPED | OUTPATIENT
Start: 2023-04-11

## 2023-04-11 RX ORDER — ROPINIROLE 2 MG/1
2 TABLET, FILM COATED ORAL NIGHTLY
Qty: 30 TABLET | Refills: 1 | Status: SHIPPED | OUTPATIENT
Start: 2023-04-11 | End: 2023-04-18 | Stop reason: SDUPTHER

## 2023-04-11 RX ORDER — FUROSEMIDE 20 MG/1
20 TABLET ORAL DAILY
Qty: 30 TABLET | Refills: 0 | Status: SHIPPED | OUTPATIENT
Start: 2023-04-11

## 2023-04-11 RX ORDER — LISINOPRIL 10 MG/1
10 TABLET ORAL NIGHTLY
Qty: 30 TABLET | Refills: 1 | Status: SHIPPED | OUTPATIENT
Start: 2023-04-11

## 2023-04-11 RX ORDER — ALBUTEROL SULFATE 90 UG/1
AEROSOL, METERED RESPIRATORY (INHALATION)
COMMUNITY
Start: 2023-01-04

## 2023-04-11 RX ORDER — POTASSIUM CHLORIDE 750 MG/1
10 TABLET, FILM COATED, EXTENDED RELEASE ORAL 2 TIMES DAILY
Qty: 30 TABLET | Refills: 0 | Status: SHIPPED | OUTPATIENT
Start: 2023-04-11

## 2023-04-11 NOTE — PROGRESS NOTES
Subjective   Claudia Stokes is a 73 y.o. female.     Chief Complaint   Patient presents with   • Establish Care   • Hospital Follow Up Visit   • Depression     PHQ-9 score: 24   • Respiratory Distress       History of Present Illness   Patient here to establish care.  Recent discharged from hospital for COPD exacerbation.  Patient is on 3 L nasal cannula O2 sat 91%.  Patient denies any short of breath chest pain.  X-ray showed possible interstitial lung disease.  Patient has a history of a tobacco.  Lower extremity edema stable patient self discontinued Lasix.  Blood pressure stable on medication.  Depression still on multiple medications PHQ-9 24.  On medication GERD stable.  Restless leg syndrome patient is on ropinirole Mirapex and the magnesium.  Patient states Mirapex does not help as much.  Patient also complains of arthritis in shoulder knee joint hip.    Current Outpatient Medications:   •  amLODIPine (NORVASC) 2.5 MG tablet, Take 1 tablet by mouth Every Night., Disp: , Rfl:   •  ARIPiprazole (ABILIFY) 2 MG tablet, Take 1 tablet by mouth Daily., Disp: , Rfl:   •  escitalopram (LEXAPRO) 20 MG tablet, Take 1 tablet by mouth Daily., Disp: , Rfl:   •  furosemide (Lasix) 20 MG tablet, Take 1 tablet by mouth Daily., Disp: 30 tablet, Rfl: 0  •  ibuprofen (ADVIL,MOTRIN) 400 MG tablet, Take 2 tablets by mouth 2 (Two) Times a Day As Needed for Mild Pain., Disp: , Rfl:   •  lisinopril (PRINIVIL,ZESTRIL) 10 MG tablet, Take 1 tablet by mouth Every Night., Disp: 30 tablet, Rfl: 1  •  Magnesium Oxide 400 (240 Mg) MG tablet, Take 1 tablet by mouth 2 (Two) Times a Day., Disp: , Rfl:   •  omeprazole (priLOSEC) 40 MG capsule, Take 1 capsule by mouth Every Night., Disp: , Rfl:   •  pramipexole (MIRAPEX) 1 MG tablet, Take 1 tablet by mouth 2 (Two) Times a Day As Needed (pt states she takes 1mg at bedtime and 1mg at 0500)., Disp: , Rfl:   •  rOPINIRole (REQUIP) 2 MG tablet, Take 1 tablet by mouth Every Night. Take 1 hour before  bedtime., Disp: 30 tablet, Rfl: 1  •  albuterol (ACCUNEB) 1.25 MG/3ML nebulizer solution, , Disp: , Rfl:   •  albuterol sulfate  (90 Base) MCG/ACT inhaler, INHALE 2 PUFFS BY MOUTH EVERY 4 TO 6 HOURS AS NEEDED FOR COUGH, Disp: , Rfl:   •  buPROPion XL (Wellbutrin XL) 150 MG 24 hr tablet, Take 1 tablet by mouth Daily., Disp: 30 tablet, Rfl: 1  •  potassium chloride 10 MEQ CR tablet, Take 1 tablet by mouth 2 (Two) Times a Day., Disp: 30 tablet, Rfl: 0    The following portions of the patient's history were reviewed and updated as appropriate: allergies, current medications, past family history, past medical history, past social history, past surgical history and problem list.    Review of Systems   Constitutional: Negative.    Respiratory: Negative.    Cardiovascular: Positive for leg swelling.   Gastrointestinal: Negative.    Musculoskeletal: Negative.    Skin: Negative.    Neurological: Negative.    Psychiatric/Behavioral: Negative for suicidal ideas. The patient is nervous/anxious.         Depressed       Objective   Physical Exam  Constitutional:       Appearance: She is obese.   Cardiovascular:      Rate and Rhythm: Normal rate and regular rhythm.      Heart sounds: Normal heart sounds.   Pulmonary:      Effort: Pulmonary effort is normal.      Breath sounds: Normal breath sounds.   Abdominal:      General: Bowel sounds are normal.   Musculoskeletal:      Cervical back: Neck supple.      Right lower leg: Edema present.      Left lower leg: Edema present.   Skin:     General: Skin is warm.   Neurological:      Mental Status: She is alert and oriented to person, place, and time.   Psychiatric:         Behavior: Behavior normal.         All tests have been reviewed.    Assessment & Plan   Diagnoses and all orders for this visit:    Primary hypertension stable on medication below continue  -     Comprehensive Metabolic Panel  -     Lipid Panel  -     TSH  -     lisinopril (PRINIVIL,ZESTRIL) 10 MG tablet; Take 1  tablet by mouth Every Night.    Current moderate episode of major depressive disorder without prior episode and Wellbutrin  -     buPROPion XL (Wellbutrin XL) 150 MG 24 hr tablet; Take 1 tablet by mouth Daily.    Gastroesophageal reflux disease with esophagitis without hemorrhage stable    Impaired mobility and ADLs    Other emphysema check x-ray  -     XR Chest PA & Lateral    RLS (restless legs syndrome) discontinue Mirapex continue ropinirole magnesium for now  -     Magnesium  -     rOPINIRole (REQUIP) 2 MG tablet; Take 1 tablet by mouth Every Night. Take 1 hour before bedtime.    Arthritis pain shoulder knee hip discussed more next    Anemia, unspecified type check lab  -     CBC & Differential    Interstitial lung disease  -     CT Chest With & Without Contrast    History of tobacco use, presenting hazards to health    Bilateral leg edema  -     furosemide (Lasix) 20 MG tablet; Take 1 tablet by mouth Daily.  -     potassium chloride 10 MEQ CR tablet; Take 1 tablet by mouth 2 (Two) Times a Day.    Other orders  -     albuterol (ACCUNEB) 1.25 MG/3ML nebulizer solution  -     albuterol sulfate  (90 Base) MCG/ACT inhaler; INHALE 2 PUFFS BY MOUTH EVERY 4 TO 6 HOURS AS NEEDED FOR COUGH    3 weeks           Ct  anemia

## 2023-04-18 ENCOUNTER — TELEPHONE (OUTPATIENT)
Dept: INTERNAL MEDICINE | Facility: CLINIC | Age: 73
End: 2023-04-18

## 2023-04-18 DIAGNOSIS — G25.81 RLS (RESTLESS LEGS SYNDROME): ICD-10-CM

## 2023-04-18 RX ORDER — ROPINIROLE 3 MG/1
3 TABLET, FILM COATED ORAL NIGHTLY
Qty: 30 TABLET | Refills: 1 | Status: SHIPPED | OUTPATIENT
Start: 2023-04-18

## 2023-04-18 NOTE — TELEPHONE ENCOUNTER
Caller: Claudia Stokes    Relationship: Self    Best call back number:  [phone]    What is the best time to reach you: ANY    Who are you requesting to speak with (clinical staff, provider,  specific staff member): CLINICAL       What was the call regarding:     PATIENT WAITING ON US TO CALL TO SET UP BLOOD WORK, CT SCAN, AND X-RAY ON LUNGS    ALSO DOCTOR ELIZABETH ASKED PATIENT CUT BACK ON MEDICATION FOR RESTLESS LEG MEDICATION - TWO DIFFERENT MEDICATIONS    SYMPTOMS ARE WORSE SINCE CUTTING BACK ON MEDICATION.  SHE WOULD LIKE TO GO BACK TO REGULAR DOSAGE OF BOTH MEDICATIONS    SHE NEEDS TO GO BACK ON CYCLOBENZAPRINE  PRAMIPEXOLE    NEEDS REFILL ON CYCLOBENZAPRINE 5 MG    [facility] [id number] - [place] [address] AT [facility] [phone] [facility] [phone] FX       Do you require a callback: YES

## 2023-04-19 NOTE — TELEPHONE ENCOUNTER
Left VM advising patient of provider recommendations and instructed to have labs drawn and x-rays done downstairs.

## 2023-04-25 NOTE — TELEPHONE ENCOUNTER
Caller: Claudia Stokes    Relationship: Self    Best call back number: [phone]    Requested Prescriptions:   Requested Prescriptions     Pending Prescriptions Disp Refills   • ARIPiprazole (ABILIFY) 2 MG tablet       Sig: Take 1 tablet by mouth Daily.   CYCLOBENZAPRINE 5 MG TID AS NEEDED       Pharmacy where request should be sent: [facility] [id number] [address] AT [facility] [phone] [facility] [phone]      Last office visit with prescribing clinician: 4/11/2023   Last telemedicine visit with prescribing clinician: Visit date not found   Next office visit with prescribing clinician: Visit date not found     Additional details provided by patient:  PATIENT HAS 1 PILL LEFT.      Does the patient have less than a 3 day supply:  [x] Yes  [] No    Would you like a call back once the refill request has been completed: [] Yes [x] No    If the office needs to give you a call back, can they leave a voicemail: [] Yes [x] No    Jill Briceno   04/25/23 14:22 EDT

## 2023-05-01 NOTE — TELEPHONE ENCOUNTER
Rx Refill Note  Requested Prescriptions     Pending Prescriptions Disp Refills   • ARIPiprazole (ABILIFY) 2 MG tablet       Sig: Take 1 tablet by mouth Daily.      Last office visit with prescribing clinician: 4/11/2023  Next office visit with prescribing clinician: 5/23/23    Abilify comes from  a historical provider would you like to take over prescribing?     Pt also requesting Flexeril, pt stated she was getting from Dr. Linares and forgot to put on medication list    Please advise        Marlene Raymundo MA  05/01/23, 13:14 EDT

## 2023-05-03 RX ORDER — ARIPIPRAZOLE 2 MG/1
2 TABLET ORAL DAILY
Qty: 30 TABLET | Refills: 5 | Status: SHIPPED | OUTPATIENT
Start: 2023-05-03

## 2023-05-03 RX ORDER — CYCLOBENZAPRINE HCL 5 MG
5 TABLET ORAL 3 TIMES DAILY PRN
Qty: 30 TABLET | Refills: 0 | Status: SHIPPED | OUTPATIENT
Start: 2023-05-03

## 2023-05-07 DIAGNOSIS — R60.0 BILATERAL LEG EDEMA: ICD-10-CM

## 2023-05-08 RX ORDER — FUROSEMIDE 20 MG/1
TABLET ORAL
Qty: 30 TABLET | Refills: 0 | Status: SHIPPED | OUTPATIENT
Start: 2023-05-08

## 2023-05-08 NOTE — TELEPHONE ENCOUNTER
Rx Refill Note  Requested Prescriptions     Pending Prescriptions Disp Refills   • furosemide (LASIX) 20 MG tablet [Pharmacy Med Name: FUROSEMIDE 20 MG TABLET] 30 tablet 0     Sig: TAKE ONE TABLET BY MOUTH DAILY      Last office visit with prescribing clinician: 4/11/2023   Next office visit with prescribing clinician: 6/12/2023   {  Marlene Raymundo MA  05/08/23, 09:23 EDT

## 2023-05-12 ENCOUNTER — HOSPITAL ENCOUNTER (OUTPATIENT)
Dept: CT IMAGING | Facility: HOSPITAL | Age: 73
Discharge: HOME | End: 2023-05-12
Payer: MEDICARE

## 2023-05-12 PROCEDURE — 25510000001 IOPAMIDOL 61 % SOLUTION: Performed by: INTERNAL MEDICINE

## 2023-05-12 PROCEDURE — 71270 CT THORAX DX C-/C+: CPT

## 2023-05-12 RX ADMIN — IOPAMIDOL 100 ML: 612 INJECTION, SOLUTION INTRAVENOUS at 10:38

## 2023-05-13 DIAGNOSIS — I87.1 IVC (INFERIOR VENA CAVA OBSTRUCTION): Primary | ICD-10-CM

## 2023-05-13 LAB
ALBUMIN SERPL-MCNC: 4.6 G/DL (ref 3.5–5.2)
ALBUMIN/GLOB SERPL: 1.8 G/DL
ALP SERPL-CCNC: 125 U/L (ref 39–117)
ALT SERPL-CCNC: 28 U/L (ref 1–33)
AST SERPL-CCNC: 25 U/L (ref 1–32)
BASOPHILS # BLD AUTO: 0.03 10*3/MM3 (ref 0–0.2)
BASOPHILS NFR BLD AUTO: 0.4 % (ref 0–1.5)
BILIRUB SERPL-MCNC: 0.2 MG/DL (ref 0–1.2)
BUN SERPL-MCNC: 14 MG/DL (ref 8–23)
BUN/CREAT SERPL: 18.4 (ref 7–25)
CALCIUM SERPL-MCNC: 10.2 MG/DL (ref 8.6–10.5)
CHLORIDE SERPL-SCNC: 98 MMOL/L (ref 98–107)
CHOLEST SERPL-MCNC: 188 MG/DL (ref 0–200)
CO2 SERPL-SCNC: 36.8 MMOL/L (ref 22–29)
CREAT SERPL-MCNC: 0.76 MG/DL (ref 0.57–1)
EGFRCR SERPLBLD CKD-EPI 2021: 83.4 ML/MIN/1.73
EOSINOPHIL # BLD AUTO: 0.12 10*3/MM3 (ref 0–0.4)
EOSINOPHIL NFR BLD AUTO: 1.5 % (ref 0.3–6.2)
ERYTHROCYTE [DISTWIDTH] IN BLOOD BY AUTOMATED COUNT: 13.9 % (ref 12.3–15.4)
GLOBULIN SER CALC-MCNC: 2.5 GM/DL
GLUCOSE SERPL-MCNC: 113 MG/DL (ref 65–99)
HCT VFR BLD AUTO: 35.2 % (ref 34–46.6)
HDLC SERPL-MCNC: 68 MG/DL (ref 40–60)
HGB BLD-MCNC: 11 G/DL (ref 12–15.9)
IMM GRANULOCYTES # BLD AUTO: 0.03 10*3/MM3 (ref 0–0.05)
IMM GRANULOCYTES NFR BLD AUTO: 0.4 % (ref 0–0.5)
LDLC SERPL CALC-MCNC: 102 MG/DL (ref 0–100)
LYMPHOCYTES # BLD AUTO: 1.09 10*3/MM3 (ref 0.7–3.1)
LYMPHOCYTES NFR BLD AUTO: 13.7 % (ref 19.6–45.3)
MAGNESIUM SERPL-MCNC: 2.4 MG/DL (ref 1.6–2.4)
MCH RBC QN AUTO: 26.2 PG (ref 26.6–33)
MCHC RBC AUTO-ENTMCNC: 31.3 G/DL (ref 31.5–35.7)
MCV RBC AUTO: 83.8 FL (ref 79–97)
MONOCYTES # BLD AUTO: 0.47 10*3/MM3 (ref 0.1–0.9)
MONOCYTES NFR BLD AUTO: 5.9 % (ref 5–12)
NEUTROPHILS # BLD AUTO: 6.24 10*3/MM3 (ref 1.7–7)
NEUTROPHILS NFR BLD AUTO: 78.1 % (ref 42.7–76)
NRBC BLD AUTO-RTO: 0 /100 WBC (ref 0–0.2)
PLATELET # BLD AUTO: 300 10*3/MM3 (ref 140–450)
POTASSIUM SERPL-SCNC: 5.4 MMOL/L (ref 3.5–5.2)
PROT SERPL-MCNC: 7.1 G/DL (ref 6–8.5)
RBC # BLD AUTO: 4.2 10*6/MM3 (ref 3.77–5.28)
SODIUM SERPL-SCNC: 142 MMOL/L (ref 136–145)
TRIGL SERPL-MCNC: 104 MG/DL (ref 0–150)
TSH SERPL DL<=0.005 MIU/L-ACNC: 1.9 UIU/ML (ref 0.27–4.2)
VLDLC SERPL CALC-MCNC: 18 MG/DL (ref 5–40)
WBC # BLD AUTO: 7.98 10*3/MM3 (ref 3.4–10.8)

## 2023-05-15 ENCOUNTER — TELEPHONE (OUTPATIENT)
Dept: INTERNAL MEDICINE | Facility: CLINIC | Age: 73
End: 2023-05-15
Payer: MEDICARE

## 2023-05-15 NOTE — TELEPHONE ENCOUNTER
Spoke with patient.  Advised results have not been reviewed by PCP.  Patient will be contacted once results are reviewed.

## 2023-05-15 NOTE — TELEPHONE ENCOUNTER
Patient would like a nurse to call her back regarding ct scan she had done last week and go over her results with her.

## 2023-05-16 ENCOUNTER — TELEPHONE (OUTPATIENT)
Dept: INTERNAL MEDICINE | Facility: CLINIC | Age: 73
End: 2023-05-16

## 2023-05-16 DIAGNOSIS — I87.1 IVC OBSTRUCTION: Primary | ICD-10-CM

## 2023-05-16 NOTE — TELEPHONE ENCOUNTER
Spoke with patient,discussed lab results. Is concerned about the CT scan results. Will you review?

## 2023-05-16 NOTE — TELEPHONE ENCOUNTER
Caller: Claudia Stokes    Relationship: Self    Best call back number: [phone]    Caller requesting test results: PATIENT    What test was performed: CT AND BLOODWORK    When was the test performed: [id number]    Where was the test performed: [facility]    Additional notes: PLEASE ADVISE

## 2023-05-17 ENCOUNTER — TELEPHONE (OUTPATIENT)
Dept: INTERNAL MEDICINE | Facility: CLINIC | Age: 73
End: 2023-05-17
Payer: MEDICARE

## 2023-05-17 RX ORDER — ALBUTEROL SULFATE 90 UG/1
AEROSOL, METERED RESPIRATORY (INHALATION)
Qty: 18 G | Refills: 6 | Status: SHIPPED | OUTPATIENT
Start: 2023-05-17

## 2023-05-17 NOTE — TELEPHONE ENCOUNTER
Caller: Claudia Stokes    Relationship: Self    Best call back number: [phone]    What is the best time to reach you: ANYTIME    Who are you requesting to speak with (clinical staff, provider,  specific staff member): CLINICAL STAFF    What was the call regarding: PATIENT STATES THAT THEY KEEP SENDING HER THE NEBULIZER SOLUTION AND NOT THE INHALER AND SHE WOULD LIKE TO DISCUSS    Do you require a callback: YES

## 2023-05-17 NOTE — TELEPHONE ENCOUNTER
Rx Refill Note  Requested Prescriptions     Pending Prescriptions Disp Refills   • albuterol sulfate  (90 Base) MCG/ACT inhaler 18 g 6     Sig: Inhale 2 puffs by mouth every 4 to 6 hours as needed for cough      Last office visit with prescribing clinician: 4/11/2023   Last telemedicine visit with prescribing clinician: 5/16/2023   Next office visit with prescribing clinician: 6/12/2023                         Would you like a call back once the refill request has been completed: [] Yes [] No    If the office needs to give you a call back, can they leave a voicemail: [] Yes [] No    Paula Dowling LPN  05/17/23, 09:43 EDT

## 2023-05-23 ENCOUNTER — TELEPHONE (OUTPATIENT)
Dept: INTERNAL MEDICINE | Facility: CLINIC | Age: 73
End: 2023-05-23

## 2023-05-23 DIAGNOSIS — J43.8 OTHER EMPHYSEMA: Primary | ICD-10-CM

## 2023-05-23 NOTE — TELEPHONE ENCOUNTER
Caller: Claudia Stokes    Relationship: Self    Best call back number:    [phone]     What is the medical concern/diagnosis:     BASED ON CT SCAN RESULTS    What specialty or service is being requested:     CARDIOLOGIST    What is the provider, practice or medical service name:     AS RECOMMENDED BY DR JOHNSON    What is the office location:     [facility]    PATIENT STATED A REFERRAL FOR CARDIOLOGIST WAS TO BE PLACED A FEW WEEKS AGO    PATIENT STATED SHE HAS NOT HEARD FROM THE CARDIOLOGIST OFFICE FOR SCHEDULING AS OF YET    PLEASE ADVISE PATIENT WITH ANY UPDATES FOR SCHEDULING    REFERRAL COORDINATOR

## 2023-05-23 NOTE — TELEPHONE ENCOUNTER
Caller: Claudia Stokes    Relationship: Self    Best call back number:    [phone]    Requested Prescriptions:      TRELEGY INHALER    IT WAS NOTED MEDICATION WAS NOT INCLUDED ON PATIENT'S CHART    Pharmacy where request should be sent:      [place] [place]    TELEPHONE CONTACT:    [phone]    Last office visit with prescribing clinician: 4/11/2023   Last telemedicine visit with prescribing clinician: Visit date not found   Next office visit with prescribing clinician: 6/12/2023     Additional details provided by patient:     PATIENT STATED SHE HAS APPROXIMATELY (5) DAYS LEFT     Does the patient have less than a 3 day supply:  [] Yes  [x] No    Would you like a call back once the refill request has been completed: [] Yes [] No    If the office needs to give you a call back, can they leave a voicemail: [] Yes [] No    Ky Navarro Rep   05/23/23 11:43 EDT     NANY DESHPANDE

## 2023-05-31 RX ORDER — OMEPRAZOLE 40 MG/1
40 CAPSULE, DELAYED RELEASE ORAL NIGHTLY
Qty: 90 CAPSULE | Refills: 3 | Status: SHIPPED | OUTPATIENT
Start: 2023-05-31

## 2023-05-31 RX ORDER — CYCLOBENZAPRINE HCL 5 MG
5 TABLET ORAL 3 TIMES DAILY PRN
Qty: 30 TABLET | Refills: 0 | Status: SHIPPED | OUTPATIENT
Start: 2023-05-31

## 2023-05-31 NOTE — TELEPHONE ENCOUNTER
Rx Refill Note  Requested Prescriptions     Pending Prescriptions Disp Refills    cyclobenzaprine (FLEXERIL) 5 MG tablet 30 tablet 0     Sig: Take 1 tablet by mouth 3 (Three) Times a Day As Needed for Muscle Spasms.     Signed Prescriptions Disp Refills    omeprazole (priLOSEC) 40 MG capsule 90 capsule 3     Sig: Take 1 capsule by mouth Every Night.     Authorizing Provider: JOHN JOHNSON     Ordering User: LATRICE PALACIOS      Last office visit with prescribing clinician: 4/11/2023   Last telemedicine visit with prescribing clinician: Visit date not found   Next office visit with prescribing clinician: 6/12/2023                         Would you like a call back once the refill request has been completed: [] Yes [] No    If the office needs to give you a call back, can they leave a voicemail: [] Yes [] No    Latrice Palacios MA  05/31/23, 13:28 EDT

## 2023-05-31 NOTE — TELEPHONE ENCOUNTER
Caller: Claudia Stokes    Relationship: Self    Best call back number: [phone]    Requested Prescriptions:   Requested Prescriptions     Pending Prescriptions Disp Refills   • omeprazole (priLOSEC) 40 MG capsule       Sig: Take 1 capsule by mouth Every Night.   • cyclobenzaprine (FLEXERIL) 5 MG tablet 30 tablet 0     Sig: Take 1 tablet by mouth 3 (Three) Times a Day As Needed for Muscle Spasms.        Pharmacy where request should be sent: [facility] [id number] [address] AT [facility] [phone] [facility] [phone] [facility]     Last office visit with prescribing clinician: 4/11/2023   Last telemedicine visit with prescribing clinician: Visit date not found   Next office visit with prescribing clinician: 6/3/2023     Additional details provided by patient: ALMOST OUT    Does the patient have less than a 3 day supply:  [x] Yes  [] No    Would you like a call back once the refill request has been completed: [x] Yes [] No    If the office needs to give you a call back, can they leave a voicemail: [x] Yes [] No    Lila Red MA   05/31/23 12:45 EDT

## 2023-06-04 DIAGNOSIS — R60.0 BILATERAL LEG EDEMA: ICD-10-CM

## 2023-06-05 ENCOUNTER — HOSPITAL ENCOUNTER (INPATIENT)
Facility: HOSPITAL | Age: 73
LOS: 2 days | Discharge: HOME | End: 2023-06-07
Attending: EMERGENCY MEDICINE | Admitting: INTERNAL MEDICINE
Payer: MEDICARE

## 2023-06-05 ENCOUNTER — APPOINTMENT (OUTPATIENT)
Dept: GENERAL RADIOLOGY | Facility: HOSPITAL | Age: 73
End: 2023-06-05
Payer: MEDICARE

## 2023-06-05 ENCOUNTER — APPOINTMENT (OUTPATIENT)
Dept: CT IMAGING | Facility: HOSPITAL | Age: 73
End: 2023-06-05
Payer: MEDICARE

## 2023-06-05 DIAGNOSIS — Z78.9 IMPAIRED MOBILITY AND ADLS: Chronic | ICD-10-CM

## 2023-06-05 DIAGNOSIS — J44.1 COPD WITH ACUTE EXACERBATION: ICD-10-CM

## 2023-06-05 DIAGNOSIS — R41.0 CONFUSION: Primary | ICD-10-CM

## 2023-06-05 DIAGNOSIS — E87.1 HYPONATREMIA: ICD-10-CM

## 2023-06-05 DIAGNOSIS — J96.22 ACUTE ON CHRONIC RESPIRATORY FAILURE WITH HYPERCAPNIA: ICD-10-CM

## 2023-06-05 DIAGNOSIS — Z74.09 IMPAIRED MOBILITY AND ADLS: Chronic | ICD-10-CM

## 2023-06-05 LAB
A-A DO2: ABNORMAL
ALBUMIN SERPL-MCNC: 4.1 G/DL (ref 3.5–5.2)
ALBUMIN/GLOB SERPL: 1.3 G/DL
ALP SERPL-CCNC: 150 U/L (ref 39–117)
ALT SERPL W P-5'-P-CCNC: 39 U/L (ref 1–33)
ANION GAP SERPL CALCULATED.3IONS-SCNC: 8.3 MMOL/L (ref 5–15)
ANISOCYTOSIS BLD QL: NORMAL
ARTERIAL PATENCY WRIST A: POSITIVE
AST SERPL-CCNC: 33 U/L (ref 1–32)
ATMOSPHERIC PRESS: 732 MMHG
BACTERIA UR QL AUTO: ABNORMAL /HPF
BASE EXCESS BLDA CALC-SCNC: 5.6 MMOL/L (ref 0–2)
BASOPHILS # BLD AUTO: 0.04 10*3/MM3 (ref 0–0.2)
BASOPHILS NFR BLD AUTO: 0.5 % (ref 0–1.5)
BDY SITE: ABNORMAL
BILIRUB SERPL-MCNC: 0.3 MG/DL (ref 0–1.2)
BILIRUB UR QL STRIP: NEGATIVE
BUN SERPL-MCNC: 31 MG/DL (ref 8–23)
BUN/CREAT SERPL: 37.3 (ref 7–25)
CALCIUM SPEC-SCNC: 8.8 MG/DL (ref 8.6–10.5)
CHLORIDE SERPL-SCNC: 91 MMOL/L (ref 98–107)
CLARITY UR: CLEAR
CO2 SERPL-SCNC: 29.7 MMOL/L (ref 22–29)
COHGB MFR BLD: 1.3 % (ref 0–2)
COLOR UR: YELLOW
CREAT SERPL-MCNC: 0.83 MG/DL (ref 0.57–1)
DEPRECATED RDW RBC AUTO: 48.5 FL (ref 37–54)
EGFRCR SERPLBLD CKD-EPI 2021: 75 ML/MIN/1.73
EOSINOPHIL # BLD AUTO: 0.16 10*3/MM3 (ref 0–0.4)
EOSINOPHIL NFR BLD AUTO: 2.1 % (ref 0.3–6.2)
ERYTHROCYTE [DISTWIDTH] IN BLOOD BY AUTOMATED COUNT: 15.2 % (ref 12.3–15.4)
GAS FLOW AIRWAY: 3 LPM
GLOBULIN UR ELPH-MCNC: 3.2 GM/DL
GLUCOSE SERPL-MCNC: 112 MG/DL (ref 65–99)
GLUCOSE UR STRIP-MCNC: NEGATIVE MG/DL
HCO3 BLDA-SCNC: 33.5 MMOL/L (ref 22–28)
HCT VFR BLD AUTO: 35.3 % (ref 34–46.6)
HCT VFR BLD CALC: 31.6 %
HGB BLD-MCNC: 10.5 G/DL (ref 12–15.9)
HGB UR QL STRIP.AUTO: NEGATIVE
HOLD SPECIMEN: NORMAL
HOLD SPECIMEN: NORMAL
HYALINE CASTS UR QL AUTO: ABNORMAL /LPF
HYPOCHROMIA BLD QL: NORMAL
IMM GRANULOCYTES # BLD AUTO: 0.03 10*3/MM3 (ref 0–0.05)
IMM GRANULOCYTES NFR BLD AUTO: 0.4 % (ref 0–0.5)
KETONES UR QL STRIP: NEGATIVE
LEUKOCYTE ESTERASE UR QL STRIP.AUTO: ABNORMAL
LIPASE SERPL-CCNC: 47 U/L (ref 13–60)
LYMPHOCYTES # BLD AUTO: 1.33 10*3/MM3 (ref 0.7–3.1)
LYMPHOCYTES NFR BLD AUTO: 17.2 % (ref 19.6–45.3)
Lab: ABNORMAL
Lab: ABNORMAL
MCH RBC QN AUTO: 25.5 PG (ref 26.6–33)
MCHC RBC AUTO-ENTMCNC: 29.7 G/DL (ref 31.5–35.7)
MCV RBC AUTO: 85.9 FL (ref 79–97)
METHGB BLD QL: 0.6 % (ref 0–1.5)
MODALITY: ABNORMAL
MONOCYTES # BLD AUTO: 0.47 10*3/MM3 (ref 0.1–0.9)
MONOCYTES NFR BLD AUTO: 6.1 % (ref 5–12)
NEUTROPHILS NFR BLD AUTO: 5.69 10*3/MM3 (ref 1.7–7)
NEUTROPHILS NFR BLD AUTO: 73.7 % (ref 42.7–76)
NITRITE UR QL STRIP: NEGATIVE
NOTE: ABNORMAL
NOTIFIED BY: ABNORMAL
NOTIFIED WHO: ABNORMAL
NRBC BLD AUTO-RTO: 0 /100 WBC (ref 0–0.2)
OXYHGB MFR BLDV: 93 % (ref 94–99)
PCO2 BLDA: 67.5 MM HG (ref 35–45)
PCO2 TEMP ADJ BLD: ABNORMAL MM[HG]
PH BLDA: 7.3 PH UNITS (ref 7.35–7.45)
PH UR STRIP.AUTO: <=5 [PH] (ref 5–8)
PH, TEMP CORRECTED: ABNORMAL
PLATELET # BLD AUTO: 267 10*3/MM3 (ref 140–450)
PMV BLD AUTO: 8.8 FL (ref 6–12)
PO2 BLDA: 76 MM HG (ref 75–100)
PO2 TEMP ADJ BLD: ABNORMAL MM[HG]
POTASSIUM SERPL-SCNC: 5.2 MMOL/L (ref 3.5–5.2)
PROT SERPL-MCNC: 7.3 G/DL (ref 6–8.5)
PROT UR QL STRIP: NEGATIVE
RBC # BLD AUTO: 4.11 10*6/MM3 (ref 3.77–5.28)
RBC # UR STRIP: ABNORMAL /HPF
REF LAB TEST METHOD: ABNORMAL
SAO2 % BLDCOA: 94.8 % (ref 94–100)
SMALL PLATELETS BLD QL SMEAR: ADEQUATE
SODIUM SERPL-SCNC: 129 MMOL/L (ref 136–145)
SP GR UR STRIP: 1.02 (ref 1–1.03)
SQUAMOUS #/AREA URNS HPF: ABNORMAL /HPF
TROPONIN T SERPL HS-MCNC: 7 NG/L
UROBILINOGEN UR QL STRIP: ABNORMAL
VENTILATOR MODE: ABNORMAL
WBC # UR STRIP: ABNORMAL /HPF
WBC MORPH BLD: NORMAL
WBC NRBC COR # BLD: 7.72 10*3/MM3 (ref 3.4–10.8)
WHOLE BLOOD HOLD COAG: NORMAL
WHOLE BLOOD HOLD SPECIMEN: NORMAL

## 2023-06-05 PROCEDURE — 85007 BL SMEAR W/DIFF WBC COUNT: CPT | Performed by: EMERGENCY MEDICINE

## 2023-06-05 PROCEDURE — 83690 ASSAY OF LIPASE: CPT | Performed by: EMERGENCY MEDICINE

## 2023-06-05 PROCEDURE — 94640 AIRWAY INHALATION TREATMENT: CPT

## 2023-06-05 PROCEDURE — 85025 COMPLETE CBC W/AUTO DIFF WBC: CPT | Performed by: EMERGENCY MEDICINE

## 2023-06-05 PROCEDURE — 83605 ASSAY OF LACTIC ACID: CPT | Performed by: EMERGENCY MEDICINE

## 2023-06-05 PROCEDURE — 99285 EMERGENCY DEPT VISIT HI MDM: CPT

## 2023-06-05 PROCEDURE — 36600 WITHDRAWAL OF ARTERIAL BLOOD: CPT

## 2023-06-05 PROCEDURE — 81001 URINALYSIS AUTO W/SCOPE: CPT | Performed by: EMERGENCY MEDICINE

## 2023-06-05 PROCEDURE — 82375 ASSAY CARBOXYHB QUANT: CPT

## 2023-06-05 PROCEDURE — 94660 CPAP INITIATION&MGMT: CPT

## 2023-06-05 PROCEDURE — 94799 UNLISTED PULMONARY SVC/PX: CPT

## 2023-06-05 PROCEDURE — 70450 CT HEAD/BRAIN W/O DYE: CPT

## 2023-06-05 PROCEDURE — 36415 COLL VENOUS BLD VENIPUNCTURE: CPT

## 2023-06-05 PROCEDURE — 25010000002 METHYLPREDNISOLONE PER 125 MG: Performed by: EMERGENCY MEDICINE

## 2023-06-05 PROCEDURE — 71045 X-RAY EXAM CHEST 1 VIEW: CPT

## 2023-06-05 PROCEDURE — 80053 COMPREHEN METABOLIC PANEL: CPT | Performed by: EMERGENCY MEDICINE

## 2023-06-05 PROCEDURE — 82805 BLOOD GASES W/O2 SATURATION: CPT

## 2023-06-05 PROCEDURE — 84484 ASSAY OF TROPONIN QUANT: CPT | Performed by: EMERGENCY MEDICINE

## 2023-06-05 PROCEDURE — 83050 HGB METHEMOGLOBIN QUAN: CPT

## 2023-06-05 PROCEDURE — 93005 ELECTROCARDIOGRAM TRACING: CPT | Performed by: EMERGENCY MEDICINE

## 2023-06-05 PROCEDURE — 93005 ELECTROCARDIOGRAM TRACING: CPT

## 2023-06-05 RX ORDER — METHYLPREDNISOLONE SODIUM SUCCINATE 125 MG/2ML
125 INJECTION, POWDER, LYOPHILIZED, FOR SOLUTION INTRAMUSCULAR; INTRAVENOUS ONCE
Status: COMPLETED | OUTPATIENT
Start: 2023-06-05 | End: 2023-06-05

## 2023-06-05 RX ORDER — IPRATROPIUM BROMIDE AND ALBUTEROL SULFATE 2.5; .5 MG/3ML; MG/3ML
3 SOLUTION RESPIRATORY (INHALATION) ONCE
Status: COMPLETED | OUTPATIENT
Start: 2023-06-05 | End: 2023-06-05

## 2023-06-05 RX ORDER — CEFTRIAXONE 1 G/50ML
1 INJECTION, SOLUTION INTRAVENOUS ONCE
Status: COMPLETED | OUTPATIENT
Start: 2023-06-05 | End: 2023-06-06

## 2023-06-05 RX ORDER — SODIUM CHLORIDE 0.9 % (FLUSH) 0.9 %
10 SYRINGE (ML) INJECTION AS NEEDED
Status: DISCONTINUED | OUTPATIENT
Start: 2023-06-05 | End: 2023-06-07 | Stop reason: HOSPADM

## 2023-06-05 RX ADMIN — METHYLPREDNISOLONE SODIUM SUCCINATE 125 MG: 125 INJECTION, POWDER, FOR SOLUTION INTRAMUSCULAR; INTRAVENOUS at 22:25

## 2023-06-05 RX ADMIN — IPRATROPIUM BROMIDE AND ALBUTEROL SULFATE 3 ML: .5; 3 SOLUTION RESPIRATORY (INHALATION) at 22:08

## 2023-06-06 PROBLEM — E87.1 HYPONATREMIA: Status: ACTIVE | Noted: 2023-06-06

## 2023-06-06 PROBLEM — I48.91 NEW ONSET ATRIAL FIBRILLATION: Status: ACTIVE | Noted: 2023-06-06

## 2023-06-06 PROBLEM — J96.02 ACUTE RESPIRATORY FAILURE WITH HYPOXIA AND HYPERCAPNIA: Status: ACTIVE | Noted: 2023-03-21

## 2023-06-06 LAB
ANION GAP SERPL CALCULATED.3IONS-SCNC: 9.1 MMOL/L (ref 5–15)
BASOPHILS # BLD AUTO: 0.01 10*3/MM3 (ref 0–0.2)
BASOPHILS NFR BLD AUTO: 0.1 % (ref 0–1.5)
BUN SERPL-MCNC: 25 MG/DL (ref 8–23)
BUN/CREAT SERPL: 33.3 (ref 7–25)
CALCIUM SPEC-SCNC: 9 MG/DL (ref 8.6–10.5)
CHLORIDE SERPL-SCNC: 95 MMOL/L (ref 98–107)
CO2 SERPL-SCNC: 27.9 MMOL/L (ref 22–29)
CREAT SERPL-MCNC: 0.75 MG/DL (ref 0.57–1)
D-LACTATE SERPL-SCNC: 0.8 MMOL/L (ref 0.5–2)
DEPRECATED RDW RBC AUTO: 48.2 FL (ref 37–54)
EGFRCR SERPLBLD CKD-EPI 2021: 84.7 ML/MIN/1.73
EOSINOPHIL # BLD AUTO: 0 10*3/MM3 (ref 0–0.4)
EOSINOPHIL NFR BLD AUTO: 0 % (ref 0.3–6.2)
ERYTHROCYTE [DISTWIDTH] IN BLOOD BY AUTOMATED COUNT: 15.2 % (ref 12.3–15.4)
GLUCOSE SERPL-MCNC: 165 MG/DL (ref 65–99)
HCT VFR BLD AUTO: 36.4 % (ref 34–46.6)
HGB BLD-MCNC: 10.8 G/DL (ref 12–15.9)
HYPOCHROMIA BLD QL: NORMAL
IMM GRANULOCYTES # BLD AUTO: 0.04 10*3/MM3 (ref 0–0.05)
IMM GRANULOCYTES NFR BLD AUTO: 0.5 % (ref 0–0.5)
LYMPHOCYTES # BLD AUTO: 0.41 10*3/MM3 (ref 0.7–3.1)
LYMPHOCYTES NFR BLD AUTO: 5.3 % (ref 19.6–45.3)
MCH RBC QN AUTO: 25.5 PG (ref 26.6–33)
MCHC RBC AUTO-ENTMCNC: 29.7 G/DL (ref 31.5–35.7)
MCV RBC AUTO: 86.1 FL (ref 79–97)
MONOCYTES # BLD AUTO: 0.03 10*3/MM3 (ref 0.1–0.9)
MONOCYTES NFR BLD AUTO: 0.4 % (ref 5–12)
NEUTROPHILS NFR BLD AUTO: 7.29 10*3/MM3 (ref 1.7–7)
NEUTROPHILS NFR BLD AUTO: 93.7 % (ref 42.7–76)
NRBC BLD AUTO-RTO: 0 /100 WBC (ref 0–0.2)
PLATELET # BLD AUTO: 274 10*3/MM3 (ref 140–450)
PMV BLD AUTO: 9.3 FL (ref 6–12)
POTASSIUM SERPL-SCNC: 5.8 MMOL/L (ref 3.5–5.2)
RBC # BLD AUTO: 4.23 10*6/MM3 (ref 3.77–5.28)
SMALL PLATELETS BLD QL SMEAR: ADEQUATE
SODIUM SERPL-SCNC: 132 MMOL/L (ref 136–145)
WBC MORPH BLD: NORMAL
WBC NRBC COR # BLD: 7.78 10*3/MM3 (ref 3.4–10.8)

## 2023-06-06 PROCEDURE — 94799 UNLISTED PULMONARY SVC/PX: CPT

## 2023-06-06 PROCEDURE — 25010000002 LORAZEPAM PER 2 MG: Performed by: INTERNAL MEDICINE

## 2023-06-06 PROCEDURE — 87040 BLOOD CULTURE FOR BACTERIA: CPT | Performed by: EMERGENCY MEDICINE

## 2023-06-06 PROCEDURE — 99223 1ST HOSP IP/OBS HIGH 75: CPT | Performed by: STUDENT IN AN ORGANIZED HEALTH CARE EDUCATION/TRAINING PROGRAM

## 2023-06-06 PROCEDURE — 25010000002 FUROSEMIDE PER 20 MG: Performed by: INTERNAL MEDICINE

## 2023-06-06 PROCEDURE — 85025 COMPLETE CBC W/AUTO DIFF WBC: CPT | Performed by: STUDENT IN AN ORGANIZED HEALTH CARE EDUCATION/TRAINING PROGRAM

## 2023-06-06 PROCEDURE — 97165 OT EVAL LOW COMPLEX 30 MIN: CPT

## 2023-06-06 PROCEDURE — 25010000002 METHYLPREDNISOLONE PER 40 MG: Performed by: STUDENT IN AN ORGANIZED HEALTH CARE EDUCATION/TRAINING PROGRAM

## 2023-06-06 PROCEDURE — 80048 BASIC METABOLIC PNL TOTAL CA: CPT | Performed by: STUDENT IN AN ORGANIZED HEALTH CARE EDUCATION/TRAINING PROGRAM

## 2023-06-06 PROCEDURE — 97162 PT EVAL MOD COMPLEX 30 MIN: CPT

## 2023-06-06 PROCEDURE — 99222 1ST HOSP IP/OBS MODERATE 55: CPT | Performed by: INTERNAL MEDICINE

## 2023-06-06 PROCEDURE — 25010000002 CEFTRIAXONE SODIUM-DEXTROSE 1-3.74 GM-%(50ML) RECONSTITUTED SOLUTION: Performed by: EMERGENCY MEDICINE

## 2023-06-06 PROCEDURE — 85007 BL SMEAR W/DIFF WBC COUNT: CPT | Performed by: STUDENT IN AN ORGANIZED HEALTH CARE EDUCATION/TRAINING PROGRAM

## 2023-06-06 PROCEDURE — 25010000002 LORAZEPAM PER 2 MG: Performed by: STUDENT IN AN ORGANIZED HEALTH CARE EDUCATION/TRAINING PROGRAM

## 2023-06-06 RX ORDER — FUROSEMIDE 20 MG/1
TABLET ORAL
Qty: 90 TABLET | Refills: 3 | Status: SHIPPED | OUTPATIENT
Start: 2023-06-06

## 2023-06-06 RX ORDER — LORAZEPAM 2 MG/ML
1 INJECTION INTRAMUSCULAR ONCE
Status: COMPLETED | OUTPATIENT
Start: 2023-06-06 | End: 2023-06-06

## 2023-06-06 RX ORDER — NITROGLYCERIN 0.4 MG/1
0.4 TABLET SUBLINGUAL
Status: DISCONTINUED | OUTPATIENT
Start: 2023-06-06 | End: 2023-06-07 | Stop reason: HOSPADM

## 2023-06-06 RX ORDER — BUDESONIDE AND FORMOTEROL FUMARATE DIHYDRATE 160; 4.5 UG/1; UG/1
2 AEROSOL RESPIRATORY (INHALATION)
Status: DISCONTINUED | OUTPATIENT
Start: 2023-06-06 | End: 2023-06-07 | Stop reason: HOSPADM

## 2023-06-06 RX ORDER — FUROSEMIDE 10 MG/ML
40 INJECTION INTRAMUSCULAR; INTRAVENOUS ONCE
Status: COMPLETED | OUTPATIENT
Start: 2023-06-06 | End: 2023-06-06

## 2023-06-06 RX ORDER — POLYETHYLENE GLYCOL 3350 17 G/17G
17 POWDER, FOR SOLUTION ORAL DAILY PRN
Status: DISCONTINUED | OUTPATIENT
Start: 2023-06-06 | End: 2023-06-07 | Stop reason: HOSPADM

## 2023-06-06 RX ORDER — ONDANSETRON 2 MG/ML
4 INJECTION INTRAMUSCULAR; INTRAVENOUS EVERY 6 HOURS PRN
Status: DISCONTINUED | OUTPATIENT
Start: 2023-06-06 | End: 2023-06-07 | Stop reason: HOSPADM

## 2023-06-06 RX ORDER — AMOXICILLIN 250 MG
2 CAPSULE ORAL 2 TIMES DAILY
Status: DISCONTINUED | OUTPATIENT
Start: 2023-06-06 | End: 2023-06-07 | Stop reason: HOSPADM

## 2023-06-06 RX ORDER — FUROSEMIDE 20 MG/1
20 TABLET ORAL DAILY
Status: DISCONTINUED | OUTPATIENT
Start: 2023-06-06 | End: 2023-06-07

## 2023-06-06 RX ORDER — SODIUM CHLORIDE 9 MG/ML
40 INJECTION, SOLUTION INTRAVENOUS AS NEEDED
Status: DISCONTINUED | OUTPATIENT
Start: 2023-06-06 | End: 2023-06-07 | Stop reason: HOSPADM

## 2023-06-06 RX ORDER — SODIUM CHLORIDE 0.9 % (FLUSH) 0.9 %
10 SYRINGE (ML) INJECTION AS NEEDED
Status: DISCONTINUED | OUTPATIENT
Start: 2023-06-06 | End: 2023-06-07 | Stop reason: HOSPADM

## 2023-06-06 RX ORDER — SODIUM CHLORIDE 0.9 % (FLUSH) 0.9 %
10 SYRINGE (ML) INJECTION EVERY 12 HOURS SCHEDULED
Status: DISCONTINUED | OUTPATIENT
Start: 2023-06-06 | End: 2023-06-07 | Stop reason: HOSPADM

## 2023-06-06 RX ORDER — ACETAMINOPHEN 160 MG/5ML
650 SOLUTION ORAL EVERY 4 HOURS PRN
Status: DISCONTINUED | OUTPATIENT
Start: 2023-06-06 | End: 2023-06-07 | Stop reason: HOSPADM

## 2023-06-06 RX ORDER — BISACODYL 10 MG
10 SUPPOSITORY, RECTAL RECTAL DAILY PRN
Status: DISCONTINUED | OUTPATIENT
Start: 2023-06-06 | End: 2023-06-07 | Stop reason: HOSPADM

## 2023-06-06 RX ORDER — METHYLPREDNISOLONE SODIUM SUCCINATE 40 MG/ML
40 INJECTION, POWDER, LYOPHILIZED, FOR SOLUTION INTRAMUSCULAR; INTRAVENOUS EVERY 12 HOURS
Status: DISCONTINUED | OUTPATIENT
Start: 2023-06-06 | End: 2023-06-07 | Stop reason: HOSPADM

## 2023-06-06 RX ORDER — LORAZEPAM 2 MG/ML
2 INJECTION INTRAMUSCULAR EVERY 4 HOURS PRN
Status: DISCONTINUED | OUTPATIENT
Start: 2023-06-06 | End: 2023-06-06

## 2023-06-06 RX ORDER — LORAZEPAM 2 MG/ML
1 INJECTION INTRAMUSCULAR EVERY 6 HOURS PRN
Status: DISCONTINUED | OUTPATIENT
Start: 2023-06-06 | End: 2023-06-07 | Stop reason: HOSPADM

## 2023-06-06 RX ORDER — ENOXAPARIN SODIUM 100 MG/ML
40 INJECTION SUBCUTANEOUS DAILY
Status: DISCONTINUED | OUTPATIENT
Start: 2023-06-06 | End: 2023-06-06

## 2023-06-06 RX ORDER — AMIODARONE HYDROCHLORIDE 200 MG/1
200 TABLET ORAL
Status: DISCONTINUED | OUTPATIENT
Start: 2023-06-06 | End: 2023-06-07 | Stop reason: HOSPADM

## 2023-06-06 RX ORDER — IPRATROPIUM BROMIDE AND ALBUTEROL SULFATE 2.5; .5 MG/3ML; MG/3ML
3 SOLUTION RESPIRATORY (INHALATION) EVERY 4 HOURS PRN
Status: DISCONTINUED | OUTPATIENT
Start: 2023-06-06 | End: 2023-06-07 | Stop reason: HOSPADM

## 2023-06-06 RX ORDER — METOPROLOL SUCCINATE 50 MG/1
50 TABLET, EXTENDED RELEASE ORAL
Status: DISCONTINUED | OUTPATIENT
Start: 2023-06-06 | End: 2023-06-07 | Stop reason: HOSPADM

## 2023-06-06 RX ORDER — ALBUTEROL SULFATE 2.5 MG/3ML
2.5 SOLUTION RESPIRATORY (INHALATION) EVERY 6 HOURS PRN
Status: DISCONTINUED | OUTPATIENT
Start: 2023-06-06 | End: 2023-06-07 | Stop reason: HOSPADM

## 2023-06-06 RX ORDER — LORAZEPAM 2 MG/ML
2 INJECTION INTRAMUSCULAR EVERY 6 HOURS PRN
Status: DISCONTINUED | OUTPATIENT
Start: 2023-06-06 | End: 2023-06-06

## 2023-06-06 RX ORDER — ACETAMINOPHEN 325 MG/1
650 TABLET ORAL EVERY 4 HOURS PRN
Status: DISCONTINUED | OUTPATIENT
Start: 2023-06-06 | End: 2023-06-07 | Stop reason: HOSPADM

## 2023-06-06 RX ORDER — BISACODYL 5 MG/1
5 TABLET, DELAYED RELEASE ORAL DAILY PRN
Status: DISCONTINUED | OUTPATIENT
Start: 2023-06-06 | End: 2023-06-07 | Stop reason: HOSPADM

## 2023-06-06 RX ORDER — ACETAMINOPHEN 650 MG/1
650 SUPPOSITORY RECTAL EVERY 4 HOURS PRN
Status: DISCONTINUED | OUTPATIENT
Start: 2023-06-06 | End: 2023-06-07 | Stop reason: HOSPADM

## 2023-06-06 RX ADMIN — SODIUM CHLORIDE 1000 ML: 9 INJECTION, SOLUTION INTRAVENOUS at 00:14

## 2023-06-06 RX ADMIN — AMIODARONE HYDROCHLORIDE 200 MG: 200 TABLET ORAL at 12:53

## 2023-06-06 RX ADMIN — Medication 10 ML: at 08:59

## 2023-06-06 RX ADMIN — SENNOSIDES AND DOCUSATE SODIUM 2 TABLET: 50; 8.6 TABLET ORAL at 08:58

## 2023-06-06 RX ADMIN — METHYLPREDNISOLONE SODIUM SUCCINATE 40 MG: 40 INJECTION, POWDER, FOR SOLUTION INTRAMUSCULAR; INTRAVENOUS at 21:08

## 2023-06-06 RX ADMIN — METOPROLOL SUCCINATE 50 MG: 50 TABLET, EXTENDED RELEASE ORAL at 12:53

## 2023-06-06 RX ADMIN — BUDESONIDE AND FORMOTEROL FUMARATE DIHYDRATE 2 PUFF: 160; 4.5 AEROSOL RESPIRATORY (INHALATION) at 21:37

## 2023-06-06 RX ADMIN — LORAZEPAM 2 MG: 2 INJECTION INTRAMUSCULAR; INTRAVENOUS at 07:38

## 2023-06-06 RX ADMIN — METHYLPREDNISOLONE SODIUM SUCCINATE 40 MG: 40 INJECTION, POWDER, FOR SOLUTION INTRAMUSCULAR; INTRAVENOUS at 10:02

## 2023-06-06 RX ADMIN — FUROSEMIDE 20 MG: 20 TABLET ORAL at 08:58

## 2023-06-06 RX ADMIN — LORAZEPAM 1 MG: 2 INJECTION INTRAMUSCULAR; INTRAVENOUS at 03:40

## 2023-06-06 RX ADMIN — IPRATROPIUM BROMIDE AND ALBUTEROL SULFATE 3 ML: .5; 3 SOLUTION RESPIRATORY (INHALATION) at 19:09

## 2023-06-06 RX ADMIN — CEFTRIAXONE 1 G: 1 INJECTION, SOLUTION INTRAVENOUS at 00:14

## 2023-06-06 RX ADMIN — Medication 10 ML: at 21:08

## 2023-06-06 RX ADMIN — FUROSEMIDE 40 MG: 10 INJECTION, SOLUTION INTRAMUSCULAR; INTRAVENOUS at 11:06

## 2023-06-06 RX ADMIN — APIXABAN 5 MG: 5 TABLET, FILM COATED ORAL at 21:07

## 2023-06-06 RX ADMIN — APIXABAN 5 MG: 5 TABLET, FILM COATED ORAL at 08:58

## 2023-06-06 RX ADMIN — TIOTROPIUM BROMIDE INHALATION SPRAY 2 PUFF: 3.12 SPRAY, METERED RESPIRATORY (INHALATION) at 06:49

## 2023-06-06 NOTE — ED NOTES
Pt requesting medication for anxiety at this time. Attempted to call Dr. Kerley with no answer. Will reattempt.

## 2023-06-06 NOTE — THERAPY EVALUATION
Patient Name: Claudia Stokes  : 1950    MRN: [id number]                              Today's Date: 2023       Admit Date: 2023    Visit Dx:     ICD-10-CM ICD-9-CM   1. Confusion  R41.0 298.9   2. Hyponatremia  E87.1 276.1   3. Acute on chronic respiratory failure with hypercapnia  J96.22 518.84     Patient Active Problem List   Diagnosis    Acute respiratory failure with hypoxia and hypercapnia    COPD with acute exacerbation    Impaired mobility and ADLs    Other emphysema    RLS (restless legs syndrome)    Primary hypertension    Current moderate episode of major depressive disorder without prior episode    Arthritis pain    Gastroesophageal reflux disease with esophagitis without hemorrhage    Anemia    Interstitial lung disease    History of tobacco use, presenting hazards to health    Confusion    New onset atrial fibrillation    Hyponatremia     Past Medical History:   Diagnosis Date    COPD (chronic obstructive pulmonary disease)     Hypertension     Impaired functional mobility, balance, gait, and endurance      Past Surgical History:   Procedure Laterality Date    BREAST BIOPSY      VENA CAVA FILTER PLACEMENT        General Information       Row Name 23 1102          Physical Therapy Time and Intention    Document Type evaluation  -JR     Mode of Treatment physical therapy  -       Row Name 23 1102          General Information    Patient Profile Reviewed yes  -JR     Prior Level of Function independent:;all household mobility  -JR     Existing Precautions/Restrictions fall;oxygen therapy device and L/min  -JR     Barriers to Rehab none identified  -       Row Name 23 1102          Living Environment    People in Home child(yaa), adult  -JR       Row Name 23 1102          Home Main Entrance    Number of Stairs, Main Entrance two  -JR     Stair Railings, Main Entrance railing on right side (ascending)  -       Row Name 23 1102          Stairs Within Home,  "Primary    Stairs, Within Home, Primary flight to basement, but she can stay on the main floor  -JR     Number of Stairs, Within Home, Primary twelve  -       Row Name 06/06/23 1102          Cognition    Orientation Status (Cognition) oriented x 4  -       Row Name 06/06/23 1102          Safety Issues, Functional Mobility    Safety Issues Affecting Function (Mobility) safety precautions follow-through/compliance;awareness of need for assistance;insight into deficits/self-awareness  -     Impairments Affecting Function (Mobility) strength;endurance/activity tolerance;shortness of breath;balance;postural/trunk control;pain  -JR               User Key  (r) = Recorded By, (t) = Taken By, (c) = Cosigned By      Initials Name Provider Type    JR Vania Coates, PT Physical Therapist                   Mobility       Row Name 06/06/23 1128          Bed Mobility    Bed Mobility supine-sit;sit-supine  -     Supine-Sit [place] (Bed Mobility) minimum assist (75% patient effort)  -     Sit-Supine [place] (Bed Mobility) minimum assist (75% patient effort)  -     Assistive Device (Bed Mobility) head of bed elevated;bed rails  -       Row Name 06/06/23 1128          Sit-Stand Transfer    Sit-Stand [place] (Transfers) minimum assist (75% patient effort)  -     Assistive Device (Sit-Stand Transfers) walker, front-wheeled  we used [facility] x 2 staff at [facility]  -       Row Name 06/06/23 1128          Gait/Stairs (Locomotion)    [place] Level (Gait) minimum assist (75% patient effort)  -     Assistive Device (Gait) walker, front-wheeled  -     Distance in Feet (Gait) 2  -JR     Deviations/Abnormal Patterns (Gait) bilateral deviations;base of support, wide;alla decreased;festinating/shuffling;stride length decreased  -JR     Left Sided Gait Deviations weight shift ability decreased  -JR     Comment, (Gait/Stairs) bilateral knee pain reported and she has to \"stand for a few minutes and let her left leg " "get set\"  -JR               User Key  (r) = Recorded By, (t) = Taken By, (c) = Cosigned By      Initials Name Provider Type    Vania See PT Physical Therapist                   Obj/Interventions       Row Name 06/06/23 1128          Range of Motion Comprehensive    Comment, General Range of Motion BLE grossly WFL  -JR       Row Name 06/06/23 1128          Strength Comprehensive (MMT)    Comment, General Manual Muscle Testing (MMT) Assessment BLE grossly 3+/5  -JR       Row Name 06/06/23 1128          Balance    Balance Assessment sitting static balance;sitting dynamic balance;sit to stand dynamic balance;standing static balance;standing dynamic balance  -JR     Static Sitting Balance contact guard  -JR     Dynamic Sitting Balance contact guard;minimal assist  -JR     Position, Sitting Balance unsupported;sitting edge of bed  -JR     Sit to Stand Dynamic Balance minimal assist  -JR     Static Standing Balance minimal assist  -JR     Dynamic Standing Balance minimal assist  -JR     Position/Device Used, Standing Balance walker, rolling  -JR               User Key  (r) = Recorded By, (t) = Taken By, (c) = Cosigned By      Initials Name Provider Type    Vania See PT Physical Therapist                   Goals/Plan       Row Name 06/06/23 1128          Bed Mobility Goal 1 (PT)    Activity/Assistive Device (Bed Mobility Goal 1, PT) bed mobility activities, all  -JR     [place] Level/Cues Needed (Bed Mobility Goal 1, PT) contact guard required  -JR     Time Frame (Bed Mobility Goal 1, PT) short term goal (STG)  -JR     Progress/Outcomes (Bed Mobility Goal 1, PT) goal ongoing  -JR       Row Name 06/06/23 1128          Transfer Goal 1 (PT)    Activity/Assistive Device (Transfer Goal 1, PT) sit-to-stand/stand-to-sit;bed-to-chair/chair-to-bed;walker, rolling  -JR     [place] Level/Cues Needed (Transfer Goal 1, PT) contact guard required  -JR     Time Frame (Transfer Goal 1, PT) short term goal (STG)  -JR "     Progress/Outcome (Transfer Goal 1, PT) goal ongoing  -JR       Row Name 06/06/23 1128          Gait Training Goal 1 (PT)    Activity/Assistive Device (Gait Training Goal 1, PT) gait (walking locomotion);walker, rolling  -JR     [place] Level (Gait Training Goal 1, PT) contact guard required  -JR     Distance (Gait Training Goal 1, PT) 100  -JR     Time Frame (Gait Training Goal 1, PT) long term goal (LTG)  -JR     Progress/Outcome (Gait Training Goal 1, PT) goal ongoing  -JR       Row Name 06/06/23 1128          Patient Education Goal (PT)    Activity (Patient Education Goal, PT) Perform HEP x 15  -JR     [place]/Cues/Accuracy (Memory Goal 2, PT) demonstrates adequately  -JR     Time Frame (Patient Education Goal, PT) 3 days  -JR     Progress/Outcome (Patient Education Goal, PT) goal ongoing  -JR       Row Name 06/06/23 1128          Therapy Assessment/Plan (PT)    Planned Therapy Interventions (PT) strengthening;balance training;bed mobility training;transfer training;gait training;home exercise program;patient/family education  -JR               User Key  (r) = Recorded By, (t) = Taken By, (c) = Cosigned By      Initials Name Provider Type    JR Vania Coates, PT Physical Therapist                   Clinical Impression       Row Name 06/06/23 1128          Pain    Pretreatment Pain Rating 6/10  -JR     Posttreatment Pain Rating 4/10  -JR     Pain Location - Side/Orientation Bilateral  -JR     Pain Location - knee  -JR     Pain Intervention(s) Repositioned;Ambulation/increased activity  -JR     Additional Documentation Pain Scale: Numbers Pre/Post-Treatment (Group)  -       Row Name 06/06/23 1128          Plan of Care Review    Plan of Care Reviewed With patient  -JR     Progress no change  -JR     Outcome Evaluation Patient participates well in PT evaluation and demonstrates decreased overall mobility and decreased activity tolerance with SOA.  She requires minimal assistance for all mobility  including walking 2 feet with HHA of 2--she will use a RW and only need the assistance of 1.  She reports bilateral knee pain of 6/10 initially and 4/10 after therapy.  Patient is able to maintain oxygen saturation levels of at least 90% while wearing 5 LPM of oxygen.  She is expected to benefit from additional PT services while hospitalized and upon discharge to home with home health.  -       Row Name 06/06/23 1128          Therapy Assessment/Plan (PT)    Patient/Family Therapy Goals Statement (PT) Patient is eager to go home  -JR     Rehab Potential (PT) good, to achieve stated therapy goals  -     Criteria for Skilled Interventions Met (PT) yes;meets criteria;skilled treatment is necessary  -JR     Therapy Frequency (PT) 5 times/wk  -       Row Name 06/06/23 1128          Vital Signs    Pretreatment Heart Rate (beats/min) 121  -JR     Intratreatment Heart Rate (beats/min) 138  -JR     Posttreatment Heart Rate (beats/min) 124  -JR     Pre SpO2 (%) 94  -JR     O2 Delivery Pre Treatment supplemental O2  5 LPM  -JR     Intra SpO2 (%) 90  -JR     O2 Delivery Intra Treatment supplemental O2  5 LPM  -JR     Post SpO2 (%) 94  -JR     O2 Delivery Post Treatment supplemental O2  5 LPM  -JR     Pre Patient Position Supine  -JR     Intra Patient Position Standing  -JR     Post Patient Position Supine  -JR       Row Name 06/06/23 1128          Positioning and Restraints    Pre-Treatment Position in bed  -JR     Post Treatment Position bed  -JR     In Bed fowlers;call light within reach;encouraged to call for assist;notified nsg  -JR               User Key  (r) = Recorded By, (t) = Taken By, (c) = Cosigned By      Initials Name Provider Type    JR Vania Coates, PT Physical Therapist                   Outcome Measures       Row Name 06/06/23 1128          How much help from another person do you currently need...    Turning from your back to your side while in flat bed without using bedrails? 3  -JR     Moving from lying  on back to sitting on the side of a flat bed without bedrails? 3  -JR     Moving to and from a bed to a chair (including a wheelchair)? 3  -JR     Standing up from a chair using your arms (e.g., wheelchair, bedside chair)? 3  -JR     Climbing 3-5 steps with a railing? 2  -JR     To walk in hospital room? 3  -JR     AM-PAC 6 Clicks Score (PT) 17  -JR     Highest level of mobility 5 --> Static standing  -JR       Row Name 06/06/23 1128          Functional Assessment    Outcome Measure Options AM-PAC 6 Clicks Basic Mobility (PT)  -               User Key  (r) = Recorded By, (t) = Taken By, (c) = Cosigned By      Initials Name Provider Type    Vania See, PT Physical Therapist                                 Physical Therapy Education       Title: PT OT SLP Therapies (In Progress)       Topic: Physical Therapy (In Progress)       Point: Mobility training (Done)       Learning Progress Summary             Patient Acceptance, E,TB, VU by  at 6/6/2023 0864    Comment: Role of PT and POC                         Point: Home exercise program (Not Started)       Learner Progress:  Not documented in this visit.              Point: Body mechanics (Not Started)       Learner Progress:  Not documented in this visit.              Point: Precautions (Not Started)       Learner Progress:  Not documented in this visit.                              User Key       Initials Effective Dates Name Provider Type Discipline     03/23/22 -  Vania Coates, PT Physical Therapist PT                  PT Recommendation and Plan  Planned Therapy Interventions (PT): strengthening, balance training, bed mobility training, transfer training, gait training, home exercise program, patient/family education  Plan of Care Reviewed With: patient  Progress: no change  Outcome Evaluation: Patient participates well in PT evaluation and demonstrates decreased overall mobility and decreased activity tolerance with SOA.  She requires minimal assistance  for all mobility including walking 2 feet with HHA of 2--she will use a RW and only need the assistance of 1.  She reports bilateral knee pain of 6/10 initially and 4/10 after therapy.  Patient is able to maintain oxygen saturation levels of at least 90% while wearing 5 LPM of oxygen.  She is expected to benefit from additional PT services while hospitalized and upon discharge to home with home health.     Time Calculation:    PT Charges       Row Name 06/06/23 1549             Time Calculation    Start Time 1128  -JR      PT Received On 06/06/23  -JR      PT Goal Re-Cert Due Date 06/16/23  -JR         Untimed Charges    PT Eval/Re-eval Minutes 40  -JR         Total Minutes    Untimed Charges Total Minutes 40  -JR       Total Minutes 40  -JR                User Key  (r) = Recorded By, (t) = Taken By, (c) = Cosigned By      Initials Name Provider Type    Vania See, PT Physical Therapist                  Therapy Charges for Today       Code Description Service Date Service Provider Modifiers Qty    [id number] HC PT EVAL MOD COMPLEXITY 3 6/6/2023 Vania Coates, PT GP 1            PT G-Codes  Outcome Measure Options: AM-PAC 6 Clicks Basic Mobility (PT)  AM-PAC 6 Clicks Score (PT): 17  PT Discharge Summary  Anticipated Discharge Disposition (PT): home with home health    Vania Coates, PT  6/6/2023

## 2023-06-06 NOTE — H&P
[facility]      Name:  Claudia Stokes   Age:  73 y.o.  Sex:  female  :  1950  MRN:  [id number]   Visit Number:  [id number]  Admission Date:  2023  Date Of Service:  23  Primary Care Physician:  Troy Umaña MD    Chief Complaint:     Shortness of air, confusion, lightheadedness.    History Of Presenting Illness:      Patient is a 73-year-old woman patient is a 73-year-old woman with past medical history of COPD on 3 L baseline, hypertension, mood disorder, GERD, anemia.  Presented to [facility] ED on 2023 with concern for confusion, lightheadedness, shortness of air onset 2 days.  She said that she had to turn her oxygen at home up to 5 L.  Denied any fever, chest pain, abdominal pain, vomiting, diarrhea.    ED summary: Afebrile, vital signs stable on 3 L nasal cannula.  ABG respiratory acidosis pH 7.30, PCO2 67, PO2 76, bicarb 33.  She was put on BiPAP for the hypercapnia with improvement in mental status.  EKG atrial fibrillation, no ST elevations or depressions.  High-sensitivity troponin negative, proBNP not elevated, sodium 129, alk phos 150, AST 33, ALT 39.  No leukocytosis.  Blood cultures collected.  CXR cardiomegaly, likely pulmonary edema, chronic bibasilar opacities.  CT head negative.  She was provided Rocephin, DuoNeb, Solu-Medrol, 1 L normal saline bolus.    Review Of Systems:    All systems were reviewed and negative except as mentioned in history of presenting illness, assessment and plan.    Past Medical History: Patient  has a past medical history of COPD (chronic obstructive pulmonary disease) and Hypertension.    Past Surgical History: Patient  has a past surgical history that includes Vena cava filter placement and Breast biopsy.    Social History: Patient  reports that she has quit smoking. Her smoking use included cigarettes. She has a 25.00 pack-year smoking history. She has never used smokeless tobacco. She reports current  alcohol use. She reports that she does not use drugs.    Family History:  Patient's family history has been reviewed and found to be noncontributory.     Allergies:      Codeine, Sulfa antibiotics, and Tetracyclines & related    Home Medications:    Prior to Admission Medications       Prescriptions Last Dose Informant Patient Reported? Taking?    albuterol (ACCUNEB) 1.25 MG/3ML nebulizer solution   Yes No    albuterol sulfate  (90 Base) MCG/ACT inhaler   No No    Inhale 2 puffs by mouth every 4 to 6 hours as needed for cough    amLODIPine (NORVASC) 2.5 MG tablet   Yes No    Take 1 tablet by mouth Every Night.    ARIPiprazole (ABILIFY) 2 MG tablet   No No    Take 1 tablet by mouth Daily.    buPROPion XL (Wellbutrin XL) 150 MG 24 hr tablet   No No    Take 1 tablet by mouth Daily.    cyclobenzaprine (FLEXERIL) 5 MG tablet   No No    Take 1 tablet by mouth 3 (Three) Times a Day As Needed for Muscle Spasms.    escitalopram (LEXAPRO) 20 MG tablet   Yes No    Take 1 tablet by mouth Daily.    Fluticasone-Umeclidin-Vilant (TRELEGY) 100-62.5-25 MCG/ACT inhaler   No No    Inhale 1 puff Daily.    furosemide (LASIX) 20 MG tablet   No No    TAKE ONE TABLET BY MOUTH DAILY    ibuprofen (ADVIL,MOTRIN) 400 MG tablet   Yes No    Take 2 tablets by mouth 2 (Two) Times a Day As Needed for Mild Pain.    lisinopril (PRINIVIL,ZESTRIL) 10 MG tablet   No No    Take 1 tablet by mouth Every Night.    Magnesium Oxide 400 (240 Mg) MG tablet   Yes No    Take 1 tablet by mouth 2 (Two) Times a Day.    omeprazole (priLOSEC) 40 MG capsule   No No    Take 1 capsule by mouth Every Night.    potassium chloride 10 MEQ CR tablet   No No    Take 1 tablet by mouth 2 (Two) Times a Day.    pramipexole (MIRAPEX) 1 MG tablet   Yes No    Take 1 tablet by mouth 2 (Two) Times a Day As Needed (pt states she takes 1mg at bedtime and 1mg at 0500).    rOPINIRole (REQUIP) 3 MG tablet   No No    Take 1 tablet by mouth Every Night. Take 1 hour before bedtime.       "    ED Medications:    Medications   sodium chloride 0.9 % flush 10 mL (has no administration in time range)   ipratropium-albuterol (DUO-NEB) nebulizer solution 3 mL (3 mL Nebulization Given 6/5/23 2208)   methylPREDNISolone sodium succinate (SOLU-Medrol) injection 125 mg (125 mg Intravenous Given 6/5/23 2225)   sodium chloride 0.9 % bolus 1,000 mL (0 mL Intravenous Stopped 6/6/23 0129)   cefTRIAXone (ROCEPHIN) IVPB 1 g/50ml dextrose (premix) (0 g Intravenous Stopped 6/6/23 0129)     Vital Signs:  Temp:  [98.2 °F (36.8 °C)] 98.2 °F (36.8 °C)  Heart Rate:  [] 94  Resp:  [16] 16  BP: (107-157)/(56-99) 147/99        06/05/23 2040   Weight: 102 kg (225 lb)     Body mass index is 39.86 kg/m².    Physical Exam:     Most recent vital Signs: /99   Pulse 94   Temp 98.2 °F (36.8 °C) (Oral)   Resp 16   Ht 160 cm (63\")   Wt 102 kg (225 lb)   SpO2 98%   BMI 39.86 kg/m²     Physical Exam  Constitutional:       General: She is not in acute distress.     Appearance: She is not ill-appearing.   HENT:      Mouth/Throat:      Mouth: Mucous membranes are moist.   Eyes:      Extraocular Movements: Extraocular movements intact.   Cardiovascular:      Rate and Rhythm: Normal rate. Rhythm irregular.      Pulses: Normal pulses.      Heart sounds: Normal heart sounds.   Pulmonary:      Effort: Pulmonary effort is normal.      Comments: Moderately diminished all fields, expiratory wheezing present upper fields  Abdominal:      Palpations: Abdomen is soft.      Tenderness: There is no abdominal tenderness.   Musculoskeletal:      Right lower leg: Edema present.      Left lower leg: Edema present.   Skin:     General: Skin is warm.   Neurological:      General: No focal deficit present.      Mental Status: She is alert and oriented to person, place, and time.   Psychiatric:         Mood and Affect: Mood normal.         Thought Content: Thought content normal.       Laboratory data:    I have reviewed the labs done in the " emergency room.    Results from last 7 days   Lab Units 06/05/23 2057   SODIUM mmol/L 129*   POTASSIUM mmol/L 5.2   CHLORIDE mmol/L 91*   CO2 mmol/L 29.7*   BUN mg/dL 31*   CREATININE mg/dL 0.83   CALCIUM mg/dL 8.8   BILIRUBIN mg/dL 0.3   ALK PHOS U/L 150*   ALT (SGPT) U/L 39*   AST (SGOT) U/L 33*   GLUCOSE mg/dL 112*     Results from last 7 days   Lab Units 06/05/23 2057   WBC 10*3/mm3 7.72   HEMOGLOBIN g/dL 10.5*   HEMATOCRIT % 35.3   PLATELETS 10*3/mm3 267         Results from last 7 days   Lab Units 06/05/23 2057   HSTROP T ng/L 7             Results from last 7 days   Lab Units 06/05/23 2057   LIPASE U/L 47     Results from last 7 days   Lab Units 06/05/23  2153   PH, ARTERIAL pH units 7.304*   PO2 ART mm Hg 76.0   PCO2, ARTERIAL mm Hg 67.5*   HCO3 ART mmol/L 33.5*     Results from last 7 days   Lab Units 06/05/23  2115   COLOR UA  Yellow   GLUCOSE UA  Negative   KETONES UA  Negative   LEUKOCYTES UA  Small (1+)*   PH, URINE  <=5.0   BILIRUBIN UA  Negative   UROBILINOGEN UA  0.2 E.U./dL   RBC UA /HPF None Seen   WBC UA /HPF 0-2*       Pain Management Panel           No data to display                EKG:      Atrial fibrillation, no ST elevations or depressions.    Radiology:    CT Head Without Contrast    Result Date: 6/5/2023  FINAL REPORT TECHNIQUE: Axial CT images were performed through the head. Coronal reformatted images were submitted. This study was performed with techniques to keep radiation doses as low as reasonably achievable (ALARA). Individualized dose reduction techniques using automated exposure control or adjustment of mA and/or kV according to the patient's size were employed. CLINICAL HISTORY: dizziness  FATIGUE AND WEAKNES FINDINGS: The ventricles are normal in size.  There is no evidence of hemorrhage.  There is no mass or edema identified.  There is no abnormal extra-axial fluid seen.  The sinuses are well aerated.     No acute intracranial process. Authenticated and Electronically  Signed by Gokul Del Rosario MD on 06/05/2023 11:54:59 PM     Assessment/Plan:    Inpatient general floor admission 6/5/2023 with acute respiratory failure with hypoxia and hypercapnia secondary to COPD exacerbation also found to have new onset atrial fibrillation, and hyponatremia.    At time of admission patient comfortable in bed, pleasantly conversational, said that she was feeling a lot better from ED treatments.    Acute respiratory failure with hypoxia and hypercapnia, POA  COPD exacerbation, POA  Supplemental oxygen as needed to keep saturation above 88%.  Patient would like to not sleep with BiPAP, says she is willing to wear it some while she is awake to help with her CO2.  Solu-Medrol.  DuoNebs.    New onset atrial fibrillation, POA  Cardiology consultation, recommendations appreciated.  Eliquis.  [id number] at least 3.  Echocardiogram.    Hyponatremia, POA  Monitor response to the provided fluids.  Patient likely has had reduced p.o. intake with her acute illness, although she does have bilateral lower extremity pitting edema which is chronic.    Chronic:  COPD on 3 L baseline, hypertension, mood disorder, GERD, anemia    Continue home medications.    Risk Assessment: High  DVT Prophylaxis: Eliquis  Code Status: Full code  Diet: Heart healthy, fluid restriction    Advance Care Planning   ACP discussion was held with the patient during this visit. Patient does not have an advance directive, declines further assistance.           Brian Joseph Kerley, DO  06/06/23  02:07 EDT    Dictated utilizing Dragon dictation.

## 2023-06-06 NOTE — THERAPY EVALUATION
Patient Name: Claudia Stokes  : 1950    MRN: [id number]                              Today's Date: 2023       Admit Date: 2023    Visit Dx:     ICD-10-CM ICD-9-CM   1. Confusion  R41.0 298.9   2. Hyponatremia  E87.1 276.1   3. Acute on chronic respiratory failure with hypercapnia  J96.22 518.84     Patient Active Problem List   Diagnosis    Acute respiratory failure with hypoxia and hypercapnia    COPD with acute exacerbation    Impaired mobility and ADLs    Other emphysema    RLS (restless legs syndrome)    Primary hypertension    Current moderate episode of major depressive disorder without prior episode    Arthritis pain    Gastroesophageal reflux disease with esophagitis without hemorrhage    Anemia    Interstitial lung disease    History of tobacco use, presenting hazards to health    Confusion    New onset atrial fibrillation    Hyponatremia     Past Medical History:   Diagnosis Date    COPD (chronic obstructive pulmonary disease)     Hypertension     Impaired functional mobility, balance, gait, and endurance      Past Surgical History:   Procedure Laterality Date    BREAST BIOPSY      VENA CAVA FILTER PLACEMENT        General Information       Row Name 23 1536          OT Time and Intention    Document Type evaluation  -     Mode of Treatment occupational therapy  -       Row Name 23 1536          General Information    Patient Profile Reviewed yes  -     Prior Level of Function independent:;ADL's;all household mobility  -     Existing Precautions/Restrictions fall;oxygen therapy device and L/min  -       Row Name 23 1536          Occupational Profile    Reason for Services/Referral (Occupational Profile) ADL decline  -       Row Name 23 1536          Living Environment    People in Home child(yaa), adult  -       Row Name 23 1536          Home Main Entrance    Number of Stairs, Main Entrance two  -     Stair Railings, Main Entrance railing on right  side (ascending)  -[facility] Name 06/06/23 1536          Stairs Within Home, Primary    Stairs, Within Home, Primary has a flight of stairs to the basement, but is able to stay on the main level  -[facility] Name 06/06/23 1536          Cognition    Orientation Status (Cognition) oriented x 4  -[facility] Name 06/06/23 1536          Safety Issues, Functional Mobility    Safety Issues Affecting Function (Mobility) awareness of need for assistance;insight into deficits/self-awareness;safety precaution awareness;safety precautions follow-through/compliance  -     Impairments Affecting Function (Mobility) strength;endurance/activity tolerance;shortness of breath;balance;postural/trunk control;pain  -               User Key  (r) = Recorded By, (t) = Taken By, (c) = Cosigned By      Initials Name Provider Type     Nadine Asencio Occupational Therapist                     Mobility/ADL's       [facility] Name 06/06/23 1537          Bed Mobility    Bed Mobility supine-sit;sit-supine  -     Supine-Sit [place] (Bed Mobility) minimum assist (75% patient effort)  -     Sit-Supine [place] (Bed Mobility) minimum assist (75% patient effort)  -     Assistive Device (Bed Mobility) head of bed elevated;bed rails  -[facility] Name 06/06/23 1537          Sit-Stand Transfer    Sit-Stand [place] (Transfers) minimum assist (75% patient effort)  -     Assistive Device (Sit-Stand Transfers) --  HHA of 2 and gait belt  -[facility] Name 06/06/23 1537          Functional Mobility    Functional Mobility- Ind. Level minimum assist (75% patient effort);2 person assist required  -     Functional Mobility- Device other (see comments)  HHA of 2 and gait belt  -     Functional Mobility-Distance (Feet) 2  -     Functional Mobility- Safety Issues supplemental O2  -[facility] Name 06/06/23 1537          Activities of Daily Living    BADL Assessment/Intervention bathing;upper body dressing;lower body  dressing;grooming;feeding;toileting  -AH       Row Name 06/06/23 1537          Bathing Assessment/Intervention    [place] Level (Bathing) moderate assist (50% patient effort)  -AH       Row Name 06/06/23 1537          Upper Body Dressing Assessment/Training    [place] Level (Upper Body Dressing) set up  -AH       Row Name 06/06/23 1537          Lower Body Dressing Assessment/Training    [place] Level (Lower Body Dressing) minimum assist (75% patient effort)  -AH       Row Name 06/06/23 1537          Grooming Assessment/Training    [place] Level (Grooming) set up  -AH       Row Name 06/06/23 1537          Self-Feeding Assessment/Training    [place] Level (Feeding) set up  -AH       Row Name 06/06/23 1537          Toileting Assessment/Training    [place] Level (Toileting) moderate assist (50% patient effort)  -               User Key  (r) = Recorded By, (t) = Taken By, (c) = Cosigned By      Initials Name Provider Type     Nadine Asencio Occupational Therapist                   Obj/Interventions       Row Name 06/06/23 1545          Sensory Assessment (Somatosensory)    Sensory Assessment (Somatosensory) sensation intact  -AH       Row Name 06/06/23 1545          Vision Assessment/Intervention    Visual Impairment/Limitations WFL;corrective lenses full-time  -AH       Row Name 06/06/23 1545          Range of Motion Comprehensive    General Range of Motion bilateral upper extremity ROM WFL  -AH       Row Name 06/06/23 1545          Strength Comprehensive (MMT)    Comment, General Manual Muscle Testing (MMT) Assessment BUE 4/5  -               User Key  (r) = Recorded By, (t) = Taken By, (c) = Cosigned By      Initials Name Provider Type     Nadine Asencio Occupational Therapist                   Goals/Plan       Row Name 06/06/23 1600          Bed Mobility Goal 1 (OT)    Activity/Assistive Device (Bed Mobility Goal 1, OT) bed mobility activities, all  -     [place]  Level/Cues Needed (Bed Mobility Goal 1, OT) contact guard required  -     Time Frame (Bed Mobility Goal 1, OT) by discharge  -     Progress/Outcomes (Bed Mobility Goal 1, OT) goal ongoing  -       Row Name 06/06/23 1600          Transfer Goal 1 (OT)    Activity/Assistive Device (Transfer Goal 1, OT) sit-to-stand/stand-to-sit;walker, rolling  -     [place] Level/Cues Needed (Transfer Goal 1, OT) contact guard required  -AH     Time Frame (Transfer Goal 1, OT) long term goal (LTG);5 days  -AH     Progress/Outcome (Transfer Goal 1, OT) goal ongoing  -       Row Name 06/06/23 1600          Bathing Goal 1 (OT)    Activity/Device (Bathing Goal 1, OT) bathing skills, all  -     [place] Level/Cues Needed (Bathing Goal 1, OT) set-up required  -     Time Frame (Bathing Goal 1, OT) by discharge  -     Progress/Outcomes (Bathing Goal 1, OT) goal ongoing  -[facility] Name 06/06/23 1600          Dressing Goal 1 (OT)    Activity/Device (Dressing Goal 1, OT) lower body dressing  -     [place]/Cues Needed (Dressing Goal 1, OT) contact guard required  -     Time Frame (Dressing Goal 1, OT) long term goal (LTG);5 days  -     Progress/Outcome (Dressing Goal 1, OT) goal ongoing  -       Row Name 06/06/23 1600          Toileting Goal 1 (OT)    Activity/Device (Toileting Goal 1, OT) adjust/manage clothing;perform perineal hygiene  -     [place] Level/Cues Needed (Toileting Goal 1, OT) supervision required  -     Time Frame (Toileting Goal 1, OT) by discharge  -     Progress/Outcome (Toileting Goal 1, OT) goal ongoing  -       Row Name 06/06/23 1600          Strength Goal 1 (OT)    Strength Goal 1 (OT) Pt will perform UB strengthening ex using theraband for resistance.  -     Time Frame (Strength Goal 1, OT) by discharge  -     Progress/Outcome (Strength Goal 1, OT) goal ongoing  -       Row Name 06/06/23 1600          Therapy Assessment/Plan (OT)    Planned Therapy Interventions  (OT) activity tolerance training;BADL retraining;patient/caregiver education/training;transfer/mobility retraining;strengthening exercise  -               User Key  (r) = Recorded By, (t) = Taken By, (c) = Cosigned By      Initials Name Provider Type     Nadine Asencio Occupational Therapist                   Clinical Impression       [facility] Name 06/06/23 1545          Pain Assessment    Pretreatment Pain Rating 6/10  -     Posttreatment Pain Rating 4/10  -     Pain Location - Side/Orientation Bilateral  -     Pain Location - knee  -     Pain Intervention(s) Repositioned;Ambulation/increased activity  -AH       Row Name 06/06/23 1545          Plan of Care Review    Plan of Care Reviewed With patient  -     Progress no change  -     Outcome Evaluation Pt seen for OT evaluation today.  Pt presents with weakness, decreased endurance and decreased independence with self care and functional mobility tasks.  Pt able to sit eob with min assist, stood with min assist and walked 2' to head of bed with min assist of 2/HHA of 2 and gait belt.  Pt is expected to benefit from skilled OT to improve her strength and independence with ADL tasks.  -[facility] Name 06/06/23 1545          Therapy Assessment/Plan (OT)    Patient/Family Therapy Goal Statement (OT) d/c home  -     Rehab Potential (OT) good, to achieve stated therapy goals  -     Criteria for Skilled Therapeutic Interventions Met (OT) yes;skilled treatment is necessary  -     Therapy Frequency (OT) 3 times/wk  -[facility] Name 06/06/23 1545          Therapy Plan Review/Discharge Plan (OT)    Anticipated Discharge Disposition (OT) home with home health;inpatient rehabilitation facility  -[facility] Name 06/06/23 1545          Vital Signs    Pretreatment Heart Rate (beats/min) 121  -     Intratreatment Heart Rate (beats/min) 138  -     Posttreatment Heart Rate (beats/min) 124  -     Pre SpO2 (%) 94  -     O2 Delivery Pre Treatment  supplemental O2  5L  -AH     Intra SpO2 (%) 90  -AH     O2 Delivery Intra Treatment supplemental O2  -AH     Post SpO2 (%) 94  -AH     O2 Delivery Post Treatment supplemental O2  -AH       Row Name 06/06/23 1545          Positioning and Restraints    Pre-Treatment Position in bed  -AH     Post Treatment Position bed  -AH     In Bed call light within reach;encouraged to call for assist;notified [facility]  -               User Key  (r) = Recorded By, (t) = Taken By, (c) = Cosigned By      Initials Name Provider Type    Nadine Reynoso Occupational Therapist                   Outcome Measures       Row Name 06/06/23 1602          How much help from another is currently needed...    Putting on and taking off regular lower body clothing? 3  -AH     Bathing (including washing, rinsing, and drying) 2  -AH     Toileting (which includes using toilet bed pan or urinal) 2  -AH     Putting on and taking off regular upper body clothing 3  -AH     Taking care of personal grooming (such as brushing teeth) 3  -AH     Eating meals 3  -     AM-PAC 6 Clicks Score (OT) 16  -       Row Name 06/06/23 1128          How much help from another person do you currently need...    Turning from your back to your side while in flat bed without using bedrails? 3  -JR     Moving from lying on back to sitting on the side of a flat bed without bedrails? 3  -JR     Moving to and from a bed to a chair (including a wheelchair)? 3  -JR     Standing up from a chair using your arms (e.g., wheelchair, bedside chair)? 3  -JR     Climbing 3-5 steps with a railing? 2  -JR     To walk in hospital room? 3  -JR     AM-PAC 6 Clicks Score (PT) 17  -JR     Highest level of mobility 5 --> Static standing  -JR       Row Name 06/06/23 1602 06/06/23 1128       Functional Assessment    Outcome Measure [facility] AM-PAC 6 Clicks Daily Activity (OT)  - AM-PAC 6 Clicks Basic Mobility (PT)  -              User Key  (r) = Recorded By, (t) = Taken By, (c) = Cosigned By       Initials Name Provider Type     Nadine Asencio Occupational Therapist    Vania See, PT Physical Therapist                    Occupational Therapy Education       Title: PT OT SLP Therapies (In Progress)       Topic: Occupational Therapy (In Progress)       Point: ADL training (Done)       Description:   Instruct learner(s) on proper safety adaptation and remediation techniques during self care or transfers.   Instruct in proper use of assistive devices.                  Learning Progress Summary             Patient Acceptance, E,TB, VU by  at 6/6/2023 1110    Comment: Role of OT/POC                         Point: Home exercise program (Not Started)       Description:   Instruct learner(s) on appropriate technique for monitoring, assisting and/or progressing therapeutic exercises/activities.                  Learner Progress:  Not documented in this visit.              Point: Precautions (Not Started)       Description:   Instruct learner(s) on prescribed precautions during self-care and functional transfers.                  Learner Progress:  Not documented in this visit.              Point: Body mechanics (Not Started)       Description:   Instruct learner(s) on proper positioning and spine alignment during self-care, functional mobility activities and/or exercises.                  Learner Progress:  Not documented in this visit.                              User Key       Initials Effective Dates Name Provider Type Discipline     06/16/21 -  Nadine Asencio Occupational Therapist OT                  OT Recommendation and Plan  Planned Therapy Interventions (OT): activity tolerance training, BADL retraining, patient/caregiver education/training, transfer/mobility retraining, strengthening exercise  Therapy Frequency (OT): 3 times/wk  Plan of Care Review  Plan of Care Reviewed With: patient  Progress: no change  Outcome Evaluation: Pt seen for OT evaluation today.  Pt presents with weakness,  decreased endurance and decreased independence with self care and functional mobility tasks.  Pt able to sit eob with min assist, stood with min assist and walked 2' to head of bed with min assist of 2/HHA of 2 and gait belt.  Pt is expected to benefit from skilled OT to improve her strength and independence with ADL tasks.     Time Calculation:    Time Calculation- OT       Row Name 06/06/23 1605             Time Calculation- OT    OT Start Time 1127  -      OT Received On 06/06/23  -      OT Goal Re-Cert Due Date 06/16/23  -         Untimed Charges    OT Eval/Re-eval Minutes 43  -AH         Total Minutes    Untimed Charges Total Minutes 43  -AH       Total Minutes 43  -AH                User Key  (r) = Recorded By, (t) = Taken By, (c) = Cosigned By      Initials Name Provider Type    Nadine Reynoso Occupational Therapist                  Therapy Charges for Today       Code Description Service Date Service Provider Modifiers Qty    [id number] HC OT EVAL LOW COMPLEXITY 3 6/6/2023 Nadine Asencio GO 1                 Nadine Asencio  6/6/2023

## 2023-06-06 NOTE — PAYOR COMM NOTE
"TO:BC  FROM:CELIA GÓMEZ, RN PHONE [phone]  IN CLINICALS REF# [id number]    Deisi Alvarez (73 y.o. Female)       Date of Birth   1950    Social Security Number       Address   [address] [place] [id number]    Home Phone   [phone]    MRN   [id number]       [place]   None    Marital Status   Single                            Admission Date   6/5/23    Admission Type   Emergency    Admitting Provider       Attending Provider   Javad Abebe DO    Department, Room/Bed   [facility] Emergency Department, 14/14       Discharge Date       Discharge Disposition       Discharge Destination                                 Attending Provider: Javad Abebe DO    Allergies: Codeine, Sulfa Antibiotics, Tetracyclines & Related    Isolation: None   Infection: None   Code Status: CPR    Ht: 160 cm (63\")   Wt: 102 kg (225 lb)    Admission Cmt: None   Principal Problem: Confusion [R41.0]                   Active Insurance as of 6/5/2023       Primary Coverage       Payor Plan Insurance Group Employer/Plan Group    ANTHEM MEDICARE REPLACEMENT ANTHEM MEDICARE ADVANTAGE [id number]       Payor Plan Address Payor Plan Phone Number Payor Plan Fax Number Effective Dates    [address] [phone]  11/1/2022 - None Entered    [facility] [id number]         Subscriber Name Subscriber Birth Date Member ID       DEISI ALVAREZ 1950 [id number]               Secondary Coverage       Payor Plan Insurance Group Employer/Plan Group    ANTHEM MEDICAID ANTHEM MEDICAID [id number]       Payor Plan Address Payor Plan Phone Number Payor Plan Fax Number Effective Dates    [address] [address] [phone]  11/1/2022 - None Entered    [facility] [phone]         Subscriber Name Subscriber Birth Date Member ID       DEISI ALVAREZ 1950 [id number]                     Emergency Contacts        (Rel.) Home Phone Work Phone Mobile Phone    DES DUVAL (Daughter) " [phone] -- --    Rodrigo Chong (Son) -- -- [phone]                 History & Physical        Kerley, Brian Joseph, DO at 23 0207            [facility]   HISTORY AND PHYSICAL      Name:  Claudia Stokes   Age:  73 y.o.  Sex:  female  :  1950  MRN:  [id number]   Visit Number:  [id number]  Admission Date:  2023  Date Of Service:  23  Primary Care Physician:  Troy Umaña MD    Chief Complaint:     Shortness of air, confusion, lightheadedness.    History Of Presenting Illness:      Patient is a 73-year-old woman patient is a 73-year-old woman with past medical history of COPD on 3 L baseline, hypertension, mood disorder, GERD, anemia.  Presented to [facility] ED on 2023 with concern for confusion, lightheadedness, shortness of air onset 2 days.  She said that she had to turn her oxygen at home up to 5 L.  Denied any fever, chest pain, abdominal pain, vomiting, diarrhea.    ED summary: Afebrile, vital signs stable on 3 L nasal cannula.  ABG respiratory acidosis pH 7.30, PCO2 67, PO2 76, bicarb 33.  She was put on BiPAP for the hypercapnia with improvement in mental status.  EKG atrial fibrillation, no ST elevations or depressions.  High-sensitivity troponin negative, proBNP not elevated, sodium 129, alk phos 150, AST 33, ALT 39.  No leukocytosis.  Blood cultures collected.  CXR cardiomegaly, likely pulmonary edema, chronic bibasilar opacities.  CT head negative.  She was provided Rocephin, DuoNeb, Solu-Medrol, 1 L normal saline bolus.    Review Of Systems:    All systems were reviewed and negative except as mentioned in history of presenting illness, assessment and plan.    Past Medical History: Patient  has a past medical history of COPD (chronic obstructive pulmonary disease) and Hypertension.    Past Surgical History: Patient  has a past surgical history that includes Vena cava filter placement and Breast biopsy.    Social History: Patient  reports that she has  quit smoking. Her smoking use included cigarettes. She has a 25.00 pack-year smoking history. She has never used smokeless tobacco. She reports current alcohol use. She reports that she does not use drugs.    Family History:  Patient's family history has been reviewed and found to be noncontributory.     Allergies:      Codeine, Sulfa antibiotics, and Tetracyclines & related    Home Medications:    Prior to Admission Medications       Prescriptions Last Dose Informant Patient Reported? Taking?    albuterol (ACCUNEB) 1.25 MG/3ML nebulizer solution   Yes No    albuterol sulfate  (90 Base) MCG/ACT inhaler   No No    Inhale 2 puffs by mouth every 4 to 6 hours as needed for cough    amLODIPine (NORVASC) 2.5 MG tablet   Yes No    Take 1 tablet by mouth Every Night.    ARIPiprazole (ABILIFY) 2 MG tablet   No No    Take 1 tablet by mouth Daily.    buPROPion XL (Wellbutrin XL) 150 MG 24 hr tablet   No No    Take 1 tablet by mouth Daily.    cyclobenzaprine (FLEXERIL) 5 MG tablet   No No    Take 1 tablet by mouth 3 (Three) Times a Day As Needed for Muscle Spasms.    escitalopram (LEXAPRO) 20 MG tablet   Yes No    Take 1 tablet by mouth Daily.    Fluticasone-Umeclidin-Vilant (TRELEGY) 100-62.5-25 MCG/ACT inhaler   No No    Inhale 1 puff Daily.    furosemide (LASIX) 20 MG tablet   No No    TAKE ONE TABLET BY MOUTH DAILY    ibuprofen (ADVIL,MOTRIN) 400 MG tablet   Yes No    Take 2 tablets by mouth 2 (Two) Times a Day As Needed for Mild Pain.    lisinopril (PRINIVIL,ZESTRIL) 10 MG tablet   No No    Take 1 tablet by mouth Every Night.    Magnesium Oxide 400 (240 Mg) MG tablet   Yes No    Take 1 tablet by mouth 2 (Two) Times a Day.    omeprazole (priLOSEC) 40 MG capsule   No No    Take 1 capsule by mouth Every Night.    potassium chloride 10 MEQ CR tablet   No No    Take 1 tablet by mouth 2 (Two) Times a Day.    pramipexole (MIRAPEX) 1 MG tablet   Yes No    Take 1 tablet by mouth 2 (Two) Times a Day As Needed (pt states she  "takes 1mg at bedtime and 1mg at 0500).    rOPINIRole (REQUIP) 3 MG tablet   No No    Take 1 tablet by mouth Every Night. Take 1 hour before bedtime.          ED Medications:    Medications   sodium chloride 0.9 % flush 10 mL (has no administration in time range)   ipratropium-albuterol (DUO-NEB) nebulizer solution 3 mL (3 mL Nebulization Given 6/5/23 2208)   methylPREDNISolone sodium succinate (SOLU-Medrol) injection 125 mg (125 mg Intravenous Given 6/5/23 2225)   sodium chloride 0.9 % bolus 1,000 mL (0 mL Intravenous Stopped 6/6/23 0129)   cefTRIAXone (ROCEPHIN) IVPB 1 g/50ml dextrose (premix) (0 g Intravenous Stopped 6/6/23 0129)     Vital Signs:  Temp:  [98.2 °F (36.8 °C)] 98.2 °F (36.8 °C)  Heart Rate:  [] 94  Resp:  [16] 16  BP: (107-157)/(56-99) 147/99        06/05/23 2040   Weight: 102 kg (225 lb)     Body mass index is 39.86 kg/m².    Physical Exam:     Most recent vital Signs: /99   Pulse 94   Temp 98.2 °F (36.8 °C) (Oral)   Resp 16   Ht 160 cm (63\")   Wt 102 kg (225 lb)   SpO2 98%   BMI 39.86 kg/m²     Physical Exam  Constitutional:       General: She is not in acute distress.     Appearance: She is not ill-appearing.   HENT:      Mouth/Throat:      Mouth: Mucous membranes are moist.   Eyes:      Extraocular Movements: Extraocular movements intact.   Cardiovascular:      Rate and Rhythm: Normal rate. Rhythm irregular.      Pulses: Normal pulses.      Heart sounds: Normal heart sounds.   Pulmonary:      Effort: Pulmonary effort is normal.      Comments: Moderately diminished all fields, expiratory wheezing present upper fields  Abdominal:      Palpations: Abdomen is soft.      Tenderness: There is no abdominal tenderness.   Musculoskeletal:      Right lower leg: Edema present.      Left lower leg: Edema present.   Skin:     General: Skin is warm.   Neurological:      General: No focal deficit present.      Mental Status: She is alert and oriented to person, place, and time. "   Psychiatric:         Mood and Affect: Mood normal.         Thought Content: Thought content normal.       Laboratory data:    I have reviewed the labs done in the emergency room.    Results from last 7 days   Lab Units 06/05/23 2057   SODIUM mmol/L 129*   POTASSIUM mmol/L 5.2   CHLORIDE mmol/L 91*   CO2 mmol/L 29.7*   BUN mg/dL 31*   CREATININE mg/dL 0.83   CALCIUM mg/dL 8.8   BILIRUBIN mg/dL 0.3   ALK PHOS U/L 150*   ALT (SGPT) U/L 39*   AST (SGOT) U/L 33*   GLUCOSE mg/dL 112*     Results from last 7 days   Lab Units 06/05/23 2057   WBC 10*3/mm3 7.72   HEMOGLOBIN g/dL 10.5*   HEMATOCRIT % 35.3   PLATELETS 10*3/mm3 267         Results from last 7 days   Lab Units 06/05/23 2057   HSTROP T ng/L 7             Results from last 7 days   Lab Units 06/05/23 2057   LIPASE U/L 47     Results from last 7 days   Lab Units 06/05/23  2153   PH, ARTERIAL pH units 7.304*   PO2 ART mm Hg 76.0   PCO2, ARTERIAL mm Hg 67.5*   HCO3 ART mmol/L 33.5*     Results from last 7 days   Lab Units 06/05/23 2115   COLOR UA  Yellow   GLUCOSE UA  Negative   KETONES UA  Negative   LEUKOCYTES UA  Small (1+)*   PH, URINE  <=5.0   BILIRUBIN UA  Negative   UROBILINOGEN UA  0.2 E.U./dL   RBC UA /HPF None Seen   WBC UA /HPF 0-2*       Pain Management Panel           No data to display                EKG:      Atrial fibrillation, no ST elevations or depressions.    Radiology:    CT Head Without Contrast    Result Date: 6/5/2023  FINAL REPORT TECHNIQUE: Axial CT images were performed through the head. Coronal reformatted images were submitted. This study was performed with techniques to keep radiation doses as low as reasonably achievable (ALARA). Individualized dose reduction techniques using automated exposure control or adjustment of mA and/or kV according to the patient's size were employed. CLINICAL HISTORY: dizziness  FATIGUE AND WEAKNES FINDINGS: The ventricles are normal in size.  There is no evidence of hemorrhage.  There is no mass or  edema identified.  There is no abnormal extra-axial fluid seen.  The sinuses are well aerated.     No acute intracranial process. Authenticated and Electronically Signed by Gokul Del Rosario MD on 06/05/2023 11:54:59 PM     Assessment/Plan:    Inpatient general floor admission 6/5/2023 with acute respiratory failure with hypoxia and hypercapnia secondary to COPD exacerbation also found to have new onset atrial fibrillation, and hyponatremia.    At time of admission patient comfortable in bed, pleasantly conversational, said that she was feeling a lot better from ED treatments.    Acute respiratory failure with hypoxia and hypercapnia, POA  COPD exacerbation, POA  Supplemental oxygen as needed to keep saturation above 88%.  Patient would like to not sleep with BiPAP, says she is willing to wear it some while she is awake to help with her CO2.  Solu-Medrol.  DuoNebs.    New onset atrial fibrillation, POA  Cardiology consultation, recommendations appreciated.  Eliquis.  [id number] at least 3.  Echocardiogram.    Hyponatremia, POA  Monitor response to the provided fluids.  Patient likely has had reduced p.o. intake with her acute illness, although she does have bilateral lower extremity pitting edema which is chronic.    Chronic:  COPD on 3 L baseline, hypertension, mood disorder, GERD, anemia    Continue home medications.    Risk Assessment: High  DVT Prophylaxis: Eliquis  Code Status: Full code  Diet: Heart healthy, fluid restriction    Advance Care Planning   ACP discussion was held with the patient during this visit. Patient does not have an advance directive, declines further assistance.           Brian Joseph Kerley, DO  06/06/23  02:07 EDT    Dictated utilizing Dragon dictation.    Electronically signed by Kerley, Brian Joseph, DO at 06/06/23 0308          Emergency Department Notes        Krish Serna MD at 06/05/23 4939            HPI: Claudia Stokes is a 73 y.o. female who presents to the emergency  department complaining of episodes of confusion, lightheadedness.  Patient states that last days she has felt generally weak, has had some lightheadedness when she stands up and walks.  Apparently family reported that she has been confused, she denies this.  She does worry that her CO2 is high.  She also complains of a slight increased cough.  No fevers, vomiting, chest pain.      REVIEW OF SYSTEMS: All other systems reviewed and are negative     PAST MEDICAL HISTORY:   Past Medical History:   Diagnosis Date    COPD (chronic obstructive pulmonary disease)     Hypertension         FAMILY HISTORY:   History reviewed. No pertinent family history.     SOCIAL HISTORY:   Social History     Socioeconomic History    Marital status: Single   Tobacco Use    Smoking status: Former     Packs/day: 1.00     Years: 25.00     Pack years: 25.00     Types: Cigarettes    Smokeless tobacco: Never   Vaping Use    Vaping Use: Never used   Substance and Sexual Activity    Alcohol use: Yes     Comment: occassional    Drug use: Never        SURGICAL HISTORY:   Past Surgical History:   Procedure Laterality Date    BREAST BIOPSY      VENA CAVA FILTER PLACEMENT          ALLERGIES: Codeine, Sulfa antibiotics, and Tetracyclines & related       PHYSICAL EXAM:   VITAL SIGNS:   Vitals:    06/05/23 2245   BP: 135/81   Pulse: 102   Resp:    Temp:    SpO2: 99%      CONSTITUTIONAL: Awake, well appearing, nontoxic   HENT: Atraumatic, normocephalic, oral mucosa moist, airway patent. Nares patent without drainage. External ears normal.   EYES: Conjunctivae clear, EOMI, PERRL   NECK: Trachea midline, nontender, supple   CARDIOVASCULAR: Irregularly irregular, rate 80s to 100s  PULMONARY/CHEST: Normal work of breathing.  Scattered wheezes throughout  ABDOMINAL: Nondistended, soft, nontender, no rebound or guarding.  NEUROLOGIC: Nonfocal, moves all four extremities, no gross sensory or motor deficits.   EXTREMITIES: No clubbing, cyanosis, or edema   SKIN:  Warm, Dry, No erythema, No rash       ED COURSE / MEDICAL DECISION MAKING:     Claudia Stokes is a 73 y.o. female who presents to the emergency department for evaluation of episodes of confusion, lightheadedness, cough.  Well-developed, well-nourished elderly lady in no distress with exam above.  Her vital signs are normal.  Her oxygen saturation is normal on her home O2 at 93%.  Her exam is notable for some scattered wheezes and her being in atrial fibrillation which she does not have a history of.  Will obtain labs, EKG, ABG and chest x-ray.  Will give symptomatic treatment.  Disposition pending.    Differential diagnosis includes COPD, hypercapnia, ICH, dehydration, electrolyte derangement among other etiologies.    EKG interpreted by me: atrial fibrillation, rate 83, no acute ST/T changes, this is an abnormal EKG    Lab work notable for hyponatremia, hypercapnia and acidosis.  Patient placed on BiPAP and resting comfortably.  Head CT negative.  Chest x-ray with possible right lower lobe infiltrate.  Given antibiotics.  Discussed with Dr. Kerley for admission.    35 minutes of critical care provided. This time excludes other billable procedures. Time does include preparation of documents, medical consultations, review of old records, and direct bedside care. Patient is at high risk for life-threatening deterioration due to hypercapnic respiratory failure requiring NIPPV.       Final diagnoses:   Confusion   Hyponatremia   Acute on chronic respiratory failure with hypercapnia        Krish Serna MD  06/05/23 2342      Electronically signed by Krish Serna MD at 06/05/23 2342       Chelle Jacobo, RN at 06/05/23 2312          Pt up to bedside commode at this time.     Electronically signed by Chelle Jacobo, JULIA at 06/05/23 2312       Chelle Jacobo, RN at 06/06/23 0129          Pt requesting medication for anxiety at this time. Attempted to call Dr. Kerley with no answer. Will reattempt.      Electronically signed by Chelle Jacobo, RN at 06/06/23 0130       Chelle Jacobo, RN at 06/06/23 0713          Dr. Abebe made aware of pt's anxiety level. New orders received.     Electronically signed by Chelle Jacobo RN at 06/06/23 0713       Aleah Torres, RN at 06/06/23 0824          Pt ate 75% of breakfast tray    Electronically signed by Aleah Torres, RN at 06/06/23 1111       Vital Signs (last day)       Date/Time Temp Temp src Pulse Resp BP Patient Position SpO2    06/06/23 1107 -- -- 115 -- -- -- 93    06/06/23 1106 -- -- 124 -- -- -- 94    06/06/23 1013 -- -- 131 -- -- -- 92    06/06/23 1008 -- -- 115 -- -- -- 93    06/06/23 0943 -- -- 134 -- -- -- 95    06/06/23 0900 -- -- 115 -- 153/93 -- 91    06/06/23 0845 -- -- 122 -- -- -- 87    06/06/23 0742 -- -- 120 -- -- -- 93    06/06/23 0702 -- -- 105 -- 151/96 -- 96    06/06/23 0643 -- -- 115 -- 137/84 -- 95    06/06/23 0532 -- -- -- -- -- -- 95    06/06/23 0200 -- -- -- -- 142/86 -- --    06/06/23 0045 -- -- -- -- 147/99 -- 98    06/05/23 2345 -- -- -- -- 146/95 -- 94    06/05/23 2340 -- -- 94 -- -- -- 96    06/05/23 2335 -- -- 106 -- -- -- 97    06/05/23 2330 -- -- 92 -- 135/80 -- 96    06/05/23 2325 -- -- 98 -- -- -- 97    06/05/23 2320 -- -- 100 -- 157/91 -- 96    06/05/23 2245 -- -- 102 -- 135/81 -- 99    06/05/23 2216 -- -- 106 -- -- -- 100    06/05/23 2130 -- -- 93 -- 107/75 -- 93    06/05/23 2040 98.2 (36.8) Oral 92 16 111/56 Sitting 92          Current Facility-Administered Medications   Medication Dose Route Frequency Provider Last Rate Last Admin    acetaminophen (TYLENOL) tablet 650 mg  650 mg Oral Q4H PRN Kerley, Brian Joseph, DO        Or    acetaminophen (TYLENOL) 160 MG/5ML solution 650 mg  650 mg Oral Q4H PRN Kerley, Brian Joseph, DO        Or    acetaminophen (TYLENOL) suppository 650 mg  650 mg Rectal Q4H PRN Kerley, Brian Joseph, DO        amiodarone (PACERONE) tablet 200 mg  200 mg Oral Q24H Rio Best MD         apixaban (ELIQUIS) tablet 5 mg  5 mg Oral Q12H Kerley, Brian Joseph, DO   5 mg at 06/06/23 0858    sennosides-docusate (PERICOLACE) 8.6-50 MG per tablet 2 tablet  2 tablet Oral BID Kerley, Brian Joseph, DO   2 tablet at 06/06/23 0858    And    polyethylene glycol (MIRALAX) packet 17 g  17 g Oral Daily PRN Kerley, Brian Joseph, DO        And    bisacodyl (DULCOLAX) EC tablet 5 mg  5 mg Oral Daily PRN Kerley, Brian Joseph, DO        And    bisacodyl (DULCOLAX) suppository 10 mg  10 mg Rectal Daily PRN Kerley, Brian Joseph, DO        budesonide-formoterol (SYMBICORT) 160-4.5 MCG/ACT inhaler 2 puff  2 puff Inhalation BID - RT Kerley, Brian Joseph, DO        And    tiotropium (SPIRIVA RESPIMAT) 2.5 mcg/act aerosol solution inhaler  2 puff Inhalation Daily - RT Kerley, Brian Joseph, DO   2 puff at 06/06/23 0649    Calcium Replacement - Follow Nurse / BPA Driven Protocol   Does not apply PRN Kerley, Brian Joseph, DO        furosemide (LASIX) tablet 20 mg  20 mg Oral Daily Kerley, Brian Joseph, DO   20 mg at 06/06/23 0858    ipratropium-albuterol (DUO-NEB) nebulizer solution 3 mL  3 mL Nebulization Q4H PRN Kerley, Brian Joseph, DO        LORazepam (ATIVAN) injection 2 mg  2 mg Intravenous Q4H PRN Javad Abebe DO   2 mg at 06/06/23 0738    Magnesium Standard Dose Replacement - Follow Nurse / BPA Driven Protocol   Does not apply PRN Kerley, Brian Joseph, DO        methylPREDNISolone sodium succinate (SOLU-Medrol) injection 40 mg  40 mg Intravenous Q12H Kerley, Brian Joseph, DO   40 mg at 06/06/23 1002    metoprolol succinate XL (TOPROL-XL) 24 hr tablet 50 mg  50 mg Oral Q24H Rio Best MD        nitroglycerin (NITROSTAT) SL tablet 0.4 mg  0.4 mg Sublingual Q5 Min PRN Kerley, Brian Joseph, DO        ondansetron (ZOFRAN) injection 4 mg  4 mg Intravenous Q6H PRN Kerley, Brian Joseph, DO        Phosphorus Replacement - Follow Nurse / BPA Driven Protocol   Does not apply PRN Kerley, Brian Joseph, DO        Potassium  Replacement - Follow Nurse / BPA Driven Protocol   Does not apply PRN Kerley, Brian Joseph,         sodium chloride 0.9 % flush 10 mL  10 mL Intravenous PRN Kerley, Brian Joseph, DO        sodium chloride 0.9 % flush 10 mL  10 mL Intravenous Q12H Kerley, Brian Joseph, DO   10 mL at 06/06/23 0859    sodium chloride 0.9 % flush 10 mL  10 mL Intravenous PRN Kerley, Brian Joseph, DO        sodium chloride 0.9 % infusion 40 mL  40 mL Intravenous PRN Kerley, Brian Joseph, DO         Current Outpatient Medications   Medication Sig Dispense Refill    albuterol (ACCUNEB) 1.25 MG/3ML nebulizer solution       albuterol sulfate  (90 Base) MCG/ACT inhaler Inhale 2 puffs by mouth every 4 to 6 hours as needed for cough 18 g 6    amLODIPine (NORVASC) 2.5 MG tablet Take 1 tablet by mouth Every Night.      ARIPiprazole (ABILIFY) 2 MG tablet Take 1 tablet by mouth Daily. 30 tablet 5    buPROPion XL (Wellbutrin XL) 150 MG 24 hr tablet Take 1 tablet by mouth Daily. 30 tablet 1    cyclobenzaprine (FLEXERIL) 5 MG tablet Take 1 tablet by mouth 3 (Three) Times a Day As Needed for Muscle Spasms. 30 tablet 0    escitalopram (LEXAPRO) 20 MG tablet Take 1 tablet by mouth Daily.      Fluticasone-Umeclidin-Vilant (TRELEGY) 100-62.5-25 MCG/ACT inhaler Inhale 1 puff Daily. 60 each 5    furosemide (LASIX) 20 MG tablet TAKE ONE TABLET BY MOUTH DAILY 90 tablet 3    ibuprofen (ADVIL,MOTRIN) 400 MG tablet Take 2 tablets by mouth 2 (Two) Times a Day As Needed for Mild Pain.      lisinopril (PRINIVIL,ZESTRIL) 10 MG tablet Take 1 tablet by mouth Every Night. 30 tablet 1    Magnesium Oxide 400 (240 Mg) MG tablet Take 1 tablet by mouth 2 (Two) Times a Day.      omeprazole (priLOSEC) 40 MG capsule Take 1 capsule by mouth Every Night. 90 capsule 3    potassium chloride 10 MEQ CR tablet Take 1 tablet by mouth 2 (Two) Times a Day. 30 tablet 0    pramipexole (MIRAPEX) 1 MG tablet Take 1 tablet by mouth 2 (Two) Times a Day As Needed (pt states she takes  1mg at bedtime and 1mg at 0500).      rOPINIRole (REQUIP) 3 MG tablet Take 1 tablet by mouth Every Night. Take 1 hour before bedtime. 30 tablet 1     Lab Results (last 24 hours)       Procedure Component Value Units Date/Time    Basic Metabolic Panel [[id number]]  (Abnormal) Collected: 06/06/23 0610    Specimen: Blood Updated: 06/06/23 0634     Glucose 165 mg/dL      BUN 25 mg/dL      Creatinine 0.75 mg/dL      Sodium 132 mmol/L      Potassium 5.8 mmol/L      Chloride 95 mmol/L      CO2 27.9 mmol/L      Calcium 9.0 mg/dL      BUN/Creatinine Ratio 33.3     Anion Gap 9.1 mmol/L      eGFR 84.7 mL/min/1.73     Narrative:      GFR Normal >60  Chronic Kidney Disease <60  Kidney Failure <15    The GFR formula is only valid for adults with stable renal function between ages 18 and 70.    Scan Slide [[id number]] Collected: 06/06/23 0610    Specimen: Blood Updated: 06/06/23 0632     Hypochromia Slight/1+     WBC Morphology Normal     Platelet Estimate Adequate    CBC Auto Differential [[id number]]  (Abnormal) Collected: 06/06/23 0610    Specimen: Blood Updated: 06/06/23 0628     WBC 7.78 10*3/mm3      RBC 4.23 10*6/mm3      Hemoglobin 10.8 g/dL      Hematocrit 36.4 %      MCV 86.1 fL      MCH 25.5 pg      MCHC 29.7 g/dL      RDW 15.2 %      RDW-SD 48.2 fl      MPV 9.3 fL      Platelets 274 10*3/mm3      Neutrophil % 93.7 %      Lymphocyte % 5.3 %      Monocyte % 0.4 %      Eosinophil % 0.0 %      Basophil % 0.1 %      Immature Grans % 0.5 %      Neutrophils, Absolute 7.29 10*3/mm3      Lymphocytes, Absolute 0.41 10*3/mm3      Monocytes, Absolute 0.03 10*3/mm3      Eosinophils, Absolute 0.00 10*3/mm3      Basophils, Absolute 0.01 10*3/mm3      Immature Grans, Absolute 0.04 10*3/mm3      nRBC 0.0 /100 WBC     Blood Culture - Blood, Arm, Left [[id number]] Collected: 06/06/23 0005    Specimen: Blood from Arm, Left Updated: 06/06/23 0026    Blood Culture - Blood, Arm, Right [[id number]] Collected: 06/06/23 0010    Specimen: Blood from  Arm, Right Updated: 06/06/23 0026    Lactic Acid, Plasma [[id number]]  (Normal) Collected: 06/05/23 2057    Specimen: Blood Updated: 06/06/23 0006     Lactate 0.8 mmol/L     Single High Sensitivity Troponin T [[id number]]  (Normal) Collected: 06/05/23 2057    Specimen: Blood Updated: 06/05/23 2202     HS Troponin T 7 ng/L     Narrative:      High Sensitive Troponin T Reference Range:  <10.0 ng/L- Negative Female for AMI  <15.0 ng/L- Negative Male for AMI  >=10 - Abnormal Female indicating possible myocardial injury.  >=15 - Abnormal Male indicating possible myocardial injury.   Clinicians would have to utilize clinical acumen, EKG, Troponin, and serial changes to determine if it is an Acute Myocardial Infarction or myocardial injury due to an underlying chronic condition.         [facility] [[id number]] Collected: 06/05/23 2057    Specimen: Blood Updated: 06/05/23 2201    Narrative:      The following orders were created for panel order [facility].  Procedure                               Abnormality         Status                     ---------                               -----------         ------                     Green Top (Gel)[[id number]]                                  Final result               Lavender Top[[id number]]                                     Final result               Gold Top - SST[[id number]]                                   Final result               Light Blue Top[[id number]]                                   Final result                 Please view results for these tests on the individual orders.    Gold Top - SST [[id number]] Collected: 06/05/23 2057    Specimen: Blood Updated: 06/05/23 2201     Extra Tube Hold for add-ons.     Comment: Auto resulted.       Green Top (Gel) [[id number]] Collected: 06/05/23 2057    Specimen: Blood Updated: 06/05/23 2200     Extra Tube Hold for add-ons.     Comment: Auto resulted.       Lavender Top [[id number]] Collected: 06/05/23 2057    Specimen: Blood  Updated: 06/05/23 2200     Extra Tube hold for add-on     Comment: Auto resulted       Light Blue Top [[id number]] Collected: 06/05/23 2057    Specimen: Blood Updated: 06/05/23 2200     Extra Tube Hold for add-ons.     Comment: Auto resulted       Comprehensive Metabolic Panel [[id number]]  (Abnormal) Collected: 06/05/23 2057    Specimen: Blood Updated: 06/05/23 2200     Glucose 112 mg/dL      BUN 31 mg/dL      Creatinine 0.83 mg/dL      Sodium 129 mmol/L      Potassium 5.2 mmol/L      Chloride 91 mmol/L      CO2 29.7 mmol/L      Calcium 8.8 mg/dL      Total Protein 7.3 g/dL      Albumin 4.1 g/dL      ALT (SGPT) 39 U/L      AST (SGOT) 33 U/L      Alkaline Phosphatase 150 U/L      Total Bilirubin 0.3 mg/dL      Globulin 3.2 gm/dL      A/G Ratio 1.3 g/dL      BUN/Creatinine Ratio 37.3     Anion Gap 8.3 mmol/L      eGFR 75.0 mL/min/1.73     Narrative:      GFR Normal >60  Chronic Kidney Disease <60  Kidney Failure <15    The GFR formula is only valid for adults with stable renal function between ages 18 and 70.    Lipase [[id number]]  (Normal) Collected: 06/05/23 2057    Specimen: Blood Updated: 06/05/23 2200     Lipase 47 U/L     CBC & Differential [[id number]]  (Abnormal) Collected: 06/05/23 2057    Specimen: Blood Updated: 06/05/23 2155    Narrative:      The following orders were created for panel order CBC & Differential.  Procedure                               Abnormality         Status                     ---------                               -----------         ------                     CBC Auto Differential[[id number]]        Abnormal            Final result               Scan Slide[[id number]]                                       Final result                 Please view results for these tests on the individual orders.    Scan Slide [[id number]] Collected: 06/05/23 2057    Specimen: Blood Updated: 06/05/23 2155     Anisocytosis Slight/1+     Hypochromia Slight/1+     WBC Morphology Normal     Platelet Estimate  Adequate    Blood Gas, Arterial With Co-Ox [[id number]]  (Abnormal) Collected: 06/05/23 2153    Specimen: Arterial Blood Updated: 06/05/23 2153     Site Right Radial     Reddy's Test Positive     pH, Arterial 7.304 pH units      Comment: 84 Value below reference range        pCO2, Arterial 67.5 mm Hg      Comment: 86 Value above critical limit        pO2, Arterial 76.0 mm Hg      Comment: 84 Value below reference range        HCO3, Arterial 33.5 mmol/L      Comment: 83 Value above reference range        Base Excess, Arterial 5.6 mmol/L      Comment: 83 Value above reference range        O2 Saturation, Arterial 94.8 %      Hematocrit, Blood Gas 31.6 %      Comment: 84 Value below reference range        Oxyhemoglobin 93.0 %      Comment: 84 Value below reference range        Methemoglobin 0.60 %      Carboxyhemoglobin 1.3 %      A-a DO2 --     Comment: UNABLE TO CALCULATE        Barometric Pressure for Blood Gas 732 mmHg      Modality Nasal Cannula     Flow Rate 3.0 lpm      Ventilator Mode NA     Note --     Notified Who MD     Notified By [id number]     Notified Time 06/05/2023 21:55     Collected by [id number]     Comment: Meter: [id number]     :  [id number]        pH, Temp Corrected --     pCO2, Temperature Corrected --     pO2, Temperature Corrected --    CBC Auto Differential [[id number]]  (Abnormal) Collected: 06/05/23 2057    Specimen: Blood Updated: 06/05/23 2146     WBC 7.72 10*3/mm3      RBC 4.11 10*6/mm3      Hemoglobin 10.5 g/dL      Hematocrit 35.3 %      MCV 85.9 fL      MCH 25.5 pg      MCHC 29.7 g/dL      RDW 15.2 %      RDW-SD 48.5 fl      MPV 8.8 fL      Platelets 267 10*3/mm3      Neutrophil % 73.7 %      Lymphocyte % 17.2 %      Monocyte % 6.1 %      Eosinophil % 2.1 %      Basophil % 0.5 %      Immature Grans % 0.4 %      Neutrophils, Absolute 5.69 10*3/mm3      Lymphocytes, Absolute 1.33 10*3/mm3      Monocytes, Absolute 0.47 10*3/mm3      Eosinophils, Absolute 0.16 10*3/mm3      Basophils,  Absolute 0.04 10*3/mm3      Immature Grans, Absolute 0.03 10*3/mm3      nRBC 0.0 /100 WBC     Urinalysis, Microscopic Only - Urine, Clean Catch [[id number]]  (Abnormal) Collected: 06/05/23 2115    Specimen: Urine, Clean Catch Updated: 06/05/23 2132     RBC, UA None Seen /HPF      WBC, UA 0-2 /HPF      Bacteria, UA Trace /HPF      Squamous Epithelial Cells, UA 3-6 /HPF      Hyaline Casts, UA None Seen /LPF      Methodology Manual Light Microscopy    Urinalysis With Microscopic If Indicated (No Culture) - Urine, Clean Catch [[id number]]  (Abnormal) Collected: 06/05/23 2115    Specimen: Urine, Clean Catch Updated: 06/05/23 2127     Color, UA Yellow     Appearance, UA Clear     pH, UA <=5.0     Specific Gravity, UA 1.017     Glucose, UA Negative     Ketones, UA Negative     Bilirubin, UA Negative     Blood, UA Negative     Protein, UA Negative     Leuk Esterase, UA Small (1+)     Nitrite, UA Negative     Urobilinogen, UA 0.2 E.U./dL          Physician Progress Notes (last 24 hours)  Notes from 06/05/23 1220 through 06/06/23 1220   No notes of this type exist for this encounter.

## 2023-06-06 NOTE — CASE MANAGEMENT/SOCIAL WORK
Discharge Planning Assessment  [facility]     Patient Name: Claudia Stokes  MRN: [id number]  Today's Date: 2023    Admit Date: 2023    Plan: Pt plans to return home at d/c.  Pt's son will provide d/c transport.   Discharge Needs Assessment       Row Name 23 1616       Living Environment    People in Home child(yaa), adult    Name(s) of People in Home Son Candelario and his gf Crystal    Current Living Arrangements home    Duration at Residence 3 months    Potentially Unsafe Housing Conditions none    Primary Care Provided by self    Provides Primary Care For no one    Family Caregiver if Needed none    Able to Return to Prior Arrangements yes       Resource/Environmental Concerns    Resource/Environmental Concerns none    Transportation Concerns none       Transition Planning    Patient/Family Anticipates Transition to home with family    Patient/Family Anticipated Services at Transition none    Transportation Anticipated family or friend will provide       Discharge Needs Assessment    Readmission Within the Last 30 Days no previous admission in last 30 days    Equipment Currently Used at Home oxygen;pulse ox;rollator    Concerns to be Addressed denies needs/concerns at this time    Equipment Needed After Discharge wheelchair, manual                   Discharge Plan       Row Name 23 9916       Plan    Plan Pt plans to return home at d/c.  Pt's son will provide d/c transport.    Plan Comments Pt verified name, , address, and phone number.  Pts PCP is Dr Umaña and she uses [facility].  Pt states that she has lived at the current address for approx 3 months and she lives there with her son and his gf.  Pt advised her son will provide transport at d/c.  Pt states that she is usually on 3 L NC at home and is independent with ADLs. DME at home is rollator, O2 and pulso ox. Pt uses [facility] for O2 needs.  Pt stated that she does have a wheelchair but it is too little and requested a wider one.   Pt  declines LW/ POA. Pt is not a .  Pt is currently on 6 L O2. Pt denies any steps into the house or stairs when she gets inside.  Pt  doens't have any other issues or concerns for CM at this time.  CM will continue to  follow for d/c needs / concerns.    Final Discharge Disposition Code 01 - home or self-care                  Continued Care and Services - Admitted Since 6/5/2023    Coordination has not been started for this encounter.       Selected Continued Care - Prior Encounters Includes continued care and service providers with selected services from prior encounters from 3/7/2023 to 6/6/2023      Discharged on 3/24/2023 Admission date: 3/21/2023 - Discharge disposition: Home or Self Care      Durable Medical Equipment       Service Provider Selected Services Address Phone Fax Patient Preferred    [facility] [address] [place], [place] [address]ATE DR MCCAULEY [address] [place] [id number] [phone] --       Internal Comment last updated by Rosenda Vega RN 3/23/2023 1430    Rollator walker  Pt utilized [facility] in [place]/service transfer to [place]                                        Demographic Summary       Row Name 06/06/23 1615       General Information    Admission Type inpatient    Arrived From emergency department    Referral Source emergency department    Reason for Consult discharge planning    Preferred Language English       Contact Information    Permission Granted to Share Info With family/designee    Contact Information Obtained for                    Functional Status       Row Name 06/06/23 1616       Functional Status    Usual Activity Tolerance moderate    Current Activity Tolerance moderate       Physical Activity    On average, how many days per week do you engage in moderate to strenuous exercise (like a brisk walk)? 0 days    On average, how many minutes do you engage in exercise at this level? 0 min    Number of minutes of exercise per week 0        Functional Status, IADL    Medications independent    Meal Preparation independent    Housekeeping independent    Laundry independent    Shopping independent       Mental Status Summary    Recent Changes in Mental Status/Cognitive Functioning no changes       Employment/    Employment Status disabled                   Psychosocial    No documentation.                  Abuse/Neglect    No documentation.                  Legal    No documentation.                  Substance Abuse    No documentation.                  Patient Forms    No documentation.                     Lissette Batista

## 2023-06-06 NOTE — CONSULTS
[facility]-Cardiology Consult Note    Referring Provider: Mis  Reason for Consultation: Newly recognized atrial fibrillation    Patient Care Team:  Troy Umaña MD as PCP - General (Internal Medicine)    Chief complaint : Altered mental status    Subjective:    History of present illness: This is a 73-year-old female patient brought to the emergency room for confusion and altered mental status.  On cardiac monitoring she was noted to be in atrial fibrillation.  She has no prior known history of atrial fibrillation.  She has a history of hypertension but no documented history of diabetes.  She is a reformed smoker.  There is no history of coronary artery disease or other vascular disease.  She has no personal history of known stroke or TIA.  She has no known history of valvular heart disease or congestive heart failure.  She is currently unable to answer any questions, but is awake.    Review of Systems   ROS-unable to obtain    History  Past Medical History:   Diagnosis Date    COPD (chronic obstructive pulmonary disease)     Hypertension    ,   Past Surgical History:   Procedure Laterality Date    BREAST BIOPSY      VENA CAVA FILTER PLACEMENT     , History reviewed. No pertinent family history.,   Social History     Tobacco Use    Smoking status: Former     Packs/day: 1.00     Years: 25.00     Pack years: 25.00     Types: Cigarettes    Smokeless tobacco: Never   Vaping Use    Vaping Use: Never used   Substance Use Topics    Alcohol use: Yes     Comment: occassional    Drug use: Never   , (Not in a hospital admission)   and Allergies:  Codeine, Sulfa antibiotics, and Tetracyclines & related    Objective:    Vital Sign Min/Max for last 24 hours  Temp  Min: 98.2 °F (36.8 °C)  Max: 98.2 °F (36.8 °C)   BP  Min: 107/75  Max: 157/91   Pulse  Min: 92  Max: 134   Resp  Min: 16  Max: 16   SpO2  Min: 87 %  Max: 100 %   Flow (L/min)  Min: 3  Max: 6   Weight  Min: 102 kg (225 lb)  Max: 102 kg (225 lb)     Flowsheet Rows   "    Flowsheet Row First Filed Value   Admission Height 160 cm (63\") Documented at 06/05/2023 2040   Admission Weight 102 kg (225 lb) Documented at 06/05/2023 2040                 Physical Exam:   Vitals and nursing note reviewed.   Constitutional:       Appearance: Healthy appearance. Not in distress.   Neck:      Vascular: No JVR. JVD normal.   Pulmonary:      Effort: Pulmonary effort is normal.      Breath sounds: Normal breath sounds. No wheezing. No rhonchi. No rales.   Chest:      Chest wall: Not tender to palpatation.   Cardiovascular:      PMI at left midclavicular line. Normal rate. Irregularly irregular rhythm. Normal S1. Normal S2.       Murmurs: There is no murmur.      No gallop.  No click. No rub.   Pulses:     Intact distal pulses.   Edema:     Peripheral edema absent.   Abdominal:      General: Bowel sounds are normal.      Palpations: Abdomen is soft.      Tenderness: There is no abdominal tenderness.   Musculoskeletal: Normal range of motion.         General: No tenderness. Skin:     General: Skin is warm and dry.   Neurological:      General: No focal deficit present.      Mental Status: Alert.      Comments: No response to verbal questioning       Results Review:   I reviewed the patient's new clinical results.  Results from last 7 days   Lab Units 06/06/23  0610 06/05/23 2057   WBC 10*3/mm3 7.78 7.72   HEMOGLOBIN g/dL 10.8* 10.5*   HEMATOCRIT % 36.4 35.3   PLATELETS 10*3/mm3 274 267     Results from last 7 days   Lab Units 06/06/23  0610 06/05/23 2057   SODIUM mmol/L 132* 129*   POTASSIUM mmol/L 5.8* 5.2   CHLORIDE mmol/L 95* 91*   CO2 mmol/L 27.9 29.7*   BUN mg/dL 25* 31*   CREATININE mg/dL 0.75 0.83   GLUCOSE mg/dL 165* 112*   CALCIUM mg/dL 9.0 8.8     Lab Results   Lab Value Date/Time    TROPONINT 7 06/05/2023 2057    TROPONINT <6 03/21/2023 1942    TROPONINT <6 03/21/2023 1716    TROPONINT <6 03/21/2023 1716             Assessment/Plan:      Confusion    Acute respiratory failure with " hypoxia and hypercapnia    COPD with acute exacerbation    New onset atrial fibrillation    Hyponatremia      I recommend Eliquis 5 mg orally twice daily, amiodarone 200 mg orally once per day beta-blocker therapy for rate control.    I discussed the patient's findings and my recommendations with patient    Rio Best MD  06/06/23  11:33 EDT

## 2023-06-06 NOTE — PLAN OF CARE
Goal Outcome Evaluation:  Plan of Care Reviewed With: patient        Progress: no change  Outcome Evaluation: Pt seen for OT evaluation today.  Pt presents with weakness, decreased endurance and decreased independence with self care and functional mobility tasks.  Pt able to sit eob with min assist, stood with min assist and walked 2' to head of bed with min assist of 2/HHA of 2 and gait belt.  Pt is expected to benefit from skilled OT to improve her strength and independence with ADL tasks.

## 2023-06-06 NOTE — TELEPHONE ENCOUNTER
Rx Refill Note  Requested Prescriptions     Pending Prescriptions Disp Refills    furosemide (LASIX) 20 MG tablet [Pharmacy Med Name: FUROSEMIDE 20 MG TABLET] 90 tablet 3     Sig: TAKE ONE TABLET BY MOUTH DAILY      Last office visit with prescribing clinician: 4/11/2023   Last telemedicine visit with prescribing clinician: Visit date not found   Next office visit with prescribing clinician: 6/12/2023                         Would you like a call back once the refill request has been completed: [] Yes [] No    If the office needs to give you a call back, can they leave a voicemail: [] Yes [] No    Latrice Gill MA  06/06/23, 09:58 EDT

## 2023-06-06 NOTE — PLAN OF CARE
Goal Outcome Evaluation:  Plan of Care Reviewed With: patient        Progress: no change  Outcome Evaluation: Patient participates well in PT evaluation and demonstrates decreased overall mobility and decreased activity tolerance with SOA.  She requires minimal assistance for all mobility including walking 2 feet with HHA of 2--she will use a RW and only need the assistance of 1.  She reports bilateral knee pain of 6/10 initially and 4/10 after therapy.  Patient is able to maintain oxygen saturation levels of at least 90% while wearing 5 LPM of oxygen.  She is expected to benefit from additional PT services while hospitalized and upon discharge to home with home health.

## 2023-06-06 NOTE — ED PROVIDER NOTES
HPI: Claudia Stokes is a 73 y.o. female who presents to the emergency department complaining of episodes of confusion, lightheadedness.  Patient states that last days she has felt generally weak, has had some lightheadedness when she stands up and walks.  Apparently family reported that she has been confused, she denies this.  She does worry that her CO2 is high.  She also complains of a slight increased cough.  No fevers, vomiting, chest pain.      REVIEW OF SYSTEMS: All other systems reviewed and are negative     PAST MEDICAL HISTORY:   Past Medical History:   Diagnosis Date    COPD (chronic obstructive pulmonary disease)     Hypertension         FAMILY HISTORY:   History reviewed. No pertinent family history.     SOCIAL HISTORY:   Social History     Socioeconomic History    Marital status: Single   Tobacco Use    Smoking status: Former     Packs/day: 1.00     Years: 25.00     Pack years: 25.00     Types: Cigarettes    Smokeless tobacco: Never   Vaping Use    Vaping Use: Never used   Substance and Sexual Activity    Alcohol use: Yes     Comment: occassional    Drug use: Never        SURGICAL HISTORY:   Past Surgical History:   Procedure Laterality Date    BREAST BIOPSY      VENA CAVA FILTER PLACEMENT          ALLERGIES: Codeine, Sulfa antibiotics, and Tetracyclines & related       PHYSICAL EXAM:   VITAL SIGNS:   Vitals:    06/05/23 2245   BP: 135/81   Pulse: 102   Resp:    Temp:    SpO2: 99%      CONSTITUTIONAL: Awake, well appearing, nontoxic   HENT: Atraumatic, normocephalic, oral mucosa moist, airway patent. Nares patent without drainage. External ears normal.   EYES: Conjunctivae clear, EOMI, PERRL   NECK: Trachea midline, nontender, supple   CARDIOVASCULAR: Irregularly irregular, rate 80s to 100s  PULMONARY/CHEST: Normal work of breathing.  Scattered wheezes throughout  ABDOMINAL: Nondistended, soft, nontender, no rebound or guarding.  NEUROLOGIC: Nonfocal, moves all four extremities, no gross sensory or  motor deficits.   EXTREMITIES: No clubbing, cyanosis, or edema   SKIN: Warm, Dry, No erythema, No rash       ED COURSE / MEDICAL DECISION MAKING:     Claudia Stokes is a 73 y.o. female who presents to the emergency department for evaluation of episodes of confusion, lightheadedness, cough.  Well-developed, well-nourished elderly lady in no distress with exam above.  Her vital signs are normal.  Her oxygen saturation is normal on her home O2 at 93%.  Her exam is notable for some scattered wheezes and her being in atrial fibrillation which she does not have a history of.  Will obtain labs, EKG, ABG and chest x-ray.  Will give symptomatic treatment.  Disposition pending.    Differential diagnosis includes COPD, hypercapnia, ICH, dehydration, electrolyte derangement among other etiologies.    EKG interpreted by me: atrial fibrillation, rate 83, no acute ST/T changes, this is an abnormal EKG    Lab work notable for hyponatremia, hypercapnia and acidosis.  Patient placed on BiPAP and resting comfortably.  Head CT negative.  Chest x-ray with possible right lower lobe infiltrate.  Given antibiotics.  Discussed with Dr. Kerley for admission.    35 minutes of critical care provided. This time excludes other billable procedures. Time does include preparation of documents, medical consultations, review of old records, and direct bedside care. Patient is at high risk for life-threatening deterioration due to hypercapnic respiratory failure requiring NIPPV.       Final diagnoses:   Confusion   Hyponatremia   Acute on chronic respiratory failure with hypercapnia        Krish Serna MD  06/05/23 7737

## 2023-06-07 ENCOUNTER — READMISSION MANAGEMENT (OUTPATIENT)
Dept: CALL CENTER | Facility: HOSPITAL | Age: 73
End: 2023-06-07
Payer: MEDICARE

## 2023-06-07 VITALS
TEMPERATURE: 97.5 F | DIASTOLIC BLOOD PRESSURE: 89 MMHG | SYSTOLIC BLOOD PRESSURE: 143 MMHG | HEIGHT: 63 IN | BODY MASS INDEX: 45.62 KG/M2 | RESPIRATION RATE: 18 BRPM | WEIGHT: 257.5 LBS | OXYGEN SATURATION: 90 % | HEART RATE: 95 BPM

## 2023-06-07 DIAGNOSIS — I10 PRIMARY HYPERTENSION: ICD-10-CM

## 2023-06-07 DIAGNOSIS — F32.1 CURRENT MODERATE EPISODE OF MAJOR DEPRESSIVE DISORDER WITHOUT PRIOR EPISODE: ICD-10-CM

## 2023-06-07 LAB
ANION GAP SERPL CALCULATED.3IONS-SCNC: 9.4 MMOL/L (ref 5–15)
BASOPHILS # BLD AUTO: 0.01 10*3/MM3 (ref 0–0.2)
BASOPHILS NFR BLD AUTO: 0.1 % (ref 0–1.5)
BUN SERPL-MCNC: 25 MG/DL (ref 8–23)
BUN/CREAT SERPL: 33.8 (ref 7–25)
CALCIUM SPEC-SCNC: 9.5 MG/DL (ref 8.6–10.5)
CHLORIDE SERPL-SCNC: 93 MMOL/L (ref 98–107)
CO2 SERPL-SCNC: 33.6 MMOL/L (ref 22–29)
CREAT SERPL-MCNC: 0.74 MG/DL (ref 0.57–1)
DEPRECATED RDW RBC AUTO: 46.7 FL (ref 37–54)
EGFRCR SERPLBLD CKD-EPI 2021: 86.1 ML/MIN/1.73
EOSINOPHIL # BLD AUTO: 0 10*3/MM3 (ref 0–0.4)
EOSINOPHIL NFR BLD AUTO: 0 % (ref 0.3–6.2)
ERYTHROCYTE [DISTWIDTH] IN BLOOD BY AUTOMATED COUNT: 15.1 % (ref 12.3–15.4)
GLUCOSE SERPL-MCNC: 148 MG/DL (ref 65–99)
HCT VFR BLD AUTO: 34.8 % (ref 34–46.6)
HGB BLD-MCNC: 10.4 G/DL (ref 12–15.9)
HYPOCHROMIA BLD QL: NORMAL
IMM GRANULOCYTES # BLD AUTO: 0.05 10*3/MM3 (ref 0–0.05)
IMM GRANULOCYTES NFR BLD AUTO: 0.5 % (ref 0–0.5)
LYMPHOCYTES # BLD AUTO: 0.44 10*3/MM3 (ref 0.7–3.1)
LYMPHOCYTES NFR BLD AUTO: 4.3 % (ref 19.6–45.3)
MCH RBC QN AUTO: 25.5 PG (ref 26.6–33)
MCHC RBC AUTO-ENTMCNC: 29.9 G/DL (ref 31.5–35.7)
MCV RBC AUTO: 85.3 FL (ref 79–97)
MONOCYTES # BLD AUTO: 0.6 10*3/MM3 (ref 0.1–0.9)
MONOCYTES NFR BLD AUTO: 5.8 % (ref 5–12)
NEUTROPHILS NFR BLD AUTO: 89.3 % (ref 42.7–76)
NEUTROPHILS NFR BLD AUTO: 9.18 10*3/MM3 (ref 1.7–7)
NRBC BLD AUTO-RTO: 0 /100 WBC (ref 0–0.2)
PLATELET # BLD AUTO: 259 10*3/MM3 (ref 140–450)
PMV BLD AUTO: 9.4 FL (ref 6–12)
POTASSIUM SERPL-SCNC: 4.7 MMOL/L (ref 3.5–5.2)
RBC # BLD AUTO: 4.08 10*6/MM3 (ref 3.77–5.28)
SMALL PLATELETS BLD QL SMEAR: ADEQUATE
SODIUM SERPL-SCNC: 136 MMOL/L (ref 136–145)
WBC MORPH BLD: NORMAL
WBC NRBC COR # BLD: 10.28 10*3/MM3 (ref 3.4–10.8)

## 2023-06-07 PROCEDURE — 94761 N-INVAS EAR/PLS OXIMETRY MLT: CPT

## 2023-06-07 PROCEDURE — 97535 SELF CARE MNGMENT TRAINING: CPT

## 2023-06-07 PROCEDURE — 94799 UNLISTED PULMONARY SVC/PX: CPT

## 2023-06-07 PROCEDURE — 85007 BL SMEAR W/DIFF WBC COUNT: CPT | Performed by: STUDENT IN AN ORGANIZED HEALTH CARE EDUCATION/TRAINING PROGRAM

## 2023-06-07 PROCEDURE — 85025 COMPLETE CBC W/AUTO DIFF WBC: CPT | Performed by: STUDENT IN AN ORGANIZED HEALTH CARE EDUCATION/TRAINING PROGRAM

## 2023-06-07 PROCEDURE — 25010000002 METHYLPREDNISOLONE PER 40 MG: Performed by: STUDENT IN AN ORGANIZED HEALTH CARE EDUCATION/TRAINING PROGRAM

## 2023-06-07 PROCEDURE — 94664 DEMO&/EVAL PT USE INHALER: CPT

## 2023-06-07 PROCEDURE — 99239 HOSP IP/OBS DSCHRG MGMT >30: CPT | Performed by: INTERNAL MEDICINE

## 2023-06-07 PROCEDURE — 80048 BASIC METABOLIC PNL TOTAL CA: CPT | Performed by: STUDENT IN AN ORGANIZED HEALTH CARE EDUCATION/TRAINING PROGRAM

## 2023-06-07 RX ORDER — AMIODARONE HYDROCHLORIDE 200 MG/1
200 TABLET ORAL
Qty: 30 TABLET | Refills: 0 | Status: SHIPPED | OUTPATIENT
Start: 2023-06-08 | End: 2023-07-08

## 2023-06-07 RX ORDER — FUROSEMIDE 20 MG/1
20 TABLET ORAL DAILY
Status: DISCONTINUED | OUTPATIENT
Start: 2023-06-08 | End: 2023-06-07 | Stop reason: HOSPADM

## 2023-06-07 RX ORDER — PREDNISONE 20 MG/1
40 TABLET ORAL DAILY
Qty: 10 TABLET | Refills: 0 | Status: SHIPPED | OUTPATIENT
Start: 2023-06-07 | End: 2023-06-12

## 2023-06-07 RX ADMIN — APIXABAN 5 MG: 5 TABLET, FILM COATED ORAL at 09:04

## 2023-06-07 RX ADMIN — METOPROLOL SUCCINATE 50 MG: 50 TABLET, EXTENDED RELEASE ORAL at 09:04

## 2023-06-07 RX ADMIN — MICONAZOLE NITRATE 1 APPLICATION: 20 CREAM TOPICAL at 09:03

## 2023-06-07 RX ADMIN — ALBUTEROL SULFATE 2.5 MG: 2.5 SOLUTION RESPIRATORY (INHALATION) at 02:38

## 2023-06-07 RX ADMIN — IPRATROPIUM BROMIDE AND ALBUTEROL SULFATE 3 ML: .5; 3 SOLUTION RESPIRATORY (INHALATION) at 12:06

## 2023-06-07 RX ADMIN — AMIODARONE HYDROCHLORIDE 200 MG: 200 TABLET ORAL at 09:04

## 2023-06-07 RX ADMIN — IPRATROPIUM BROMIDE AND ALBUTEROL SULFATE 3 ML: .5; 3 SOLUTION RESPIRATORY (INHALATION) at 02:37

## 2023-06-07 RX ADMIN — METHYLPREDNISOLONE SODIUM SUCCINATE 40 MG: 40 INJECTION, POWDER, FOR SOLUTION INTRAMUSCULAR; INTRAVENOUS at 09:05

## 2023-06-07 RX ADMIN — MICONAZOLE NITRATE: 2 POWDER TOPICAL at 09:03

## 2023-06-07 RX ADMIN — FUROSEMIDE 20 MG: 20 TABLET ORAL at 09:04

## 2023-06-07 RX ADMIN — Medication 10 ML: at 09:04

## 2023-06-07 RX ADMIN — TIOTROPIUM BROMIDE INHALATION SPRAY 2 PUFF: 3.12 SPRAY, METERED RESPIRATORY (INHALATION) at 07:14

## 2023-06-07 RX ADMIN — ACETAMINOPHEN 650 MG: 325 TABLET, FILM COATED ORAL at 03:58

## 2023-06-07 RX ADMIN — BUDESONIDE AND FORMOTEROL FUMARATE DIHYDRATE 2 PUFF: 160; 4.5 AEROSOL RESPIRATORY (INHALATION) at 07:14

## 2023-06-07 NOTE — CASE MANAGEMENT/SOCIAL WORK
Pt request wc stating the one she has at home is old and very narrow. [employer] ( her DME ) has not supplied her with one. Secure chat sent to provider with request.

## 2023-06-07 NOTE — PLAN OF CARE
Goal Outcome Evaluation:  Plan of Care Reviewed With: patient        Progress: improving  Outcome Evaluation: OT tx completed. Patient sitting EOB upon therapist entering. Patient performed sit to stad and tf to BSC with CGA. Performed toileting task with min A to shane pull up. Patient performed tf from BSC to chair CGA. S/U with lunch tray. O2 ranged 90, 86, 90 on 3L O2. Patient S/U with lunch. Notifed RN patient requesting breathing treatment.

## 2023-06-07 NOTE — THERAPY TREATMENT NOTE
Patient Name: Claudia Stokes  : 1950    MRN: [id number]                              Today's Date: 2023       Admit Date: 2023    Visit Dx:     ICD-10-CM ICD-9-CM   1. Confusion  R41.0 298.9   2. Hyponatremia  E87.1 276.1   3. Acute on chronic respiratory failure with hypercapnia  J96.22 518.84   4. Impaired mobility and ADLs  Z74.09 V49.89    Z78.9    5. COPD with acute exacerbation  J44.1 491.21     Patient Active Problem List   Diagnosis    Acute respiratory failure with hypoxia and hypercapnia    COPD with acute exacerbation    Impaired mobility and ADLs    Other emphysema    RLS (restless legs syndrome)    Primary hypertension    Current moderate episode of major depressive disorder without prior episode    Arthritis pain    Gastroesophageal reflux disease with esophagitis without hemorrhage    Anemia    Interstitial lung disease    History of tobacco use, presenting hazards to health    Confusion    New onset atrial fibrillation    Hyponatremia     Past Medical History:   Diagnosis Date    COPD (chronic obstructive pulmonary disease)     Hypertension     Impaired functional mobility, balance, gait, and endurance      Past Surgical History:   Procedure Laterality Date    BREAST BIOPSY      VENA CAVA FILTER PLACEMENT        General Information       Row Name 23 [facility]          OT Time and Intention    Document Type therapy note (daily note)  -SD     Mode of Treatment occupational therapy  -SD       Row Name 23 1231          General Information    Patient Profile Reviewed yes  -SD               User Key  (r) = Recorded By, (t) = Taken By, (c) = Cosigned By      Initials Name Provider Type    SD Kaleigh Zaragoza OT Occupational Therapist                     Mobility/ADL's       Row Name 23 1233          Bed Mobility    Comment, (Bed Mobility) sitting EOB  -SD       Row Name 23 1233          Transfers    Transfers sit-stand transfer;toilet transfer;bed-chair transfer  -SD        Row Name 06/07/23 1233          Bed-Chair Transfer    Bed-Chair [place] (Transfers) contact guard  -SD       Row Name 06/07/23 1233          Sit-Stand Transfer    Sit-Stand [place] (Transfers) contact guard  -SD       Row Name 06/07/23 1233          Toilet Transfer    Type (Toilet Transfer) sit-stand;stand-sit  -SD     [place] Level (Toilet Transfer) contact guard  -SD     Assistive Device (Toilet Transfer) commode, bedside without drop arms  -SD               User Key  (r) = Recorded By, (t) = Taken By, (c) = Cosigned By      Initials Name Provider Type    Kaleigh Olvera OT Occupational Therapist                   Obj/Interventions    No documentation.                  Goals/Plan    No documentation.                  Clinical Impression       Row Name 06/07/23 1236          Pain Assessment    Pretreatment Pain Rating 6/10  -SD     Posttreatment Pain Rating 6/10  -SD     Pain Location - Side/Orientation Left  -SD     Pain Location - knee  -SD     Pain Intervention(s) Repositioned;Ambulation/increased activity  -SD       Row Name 06/07/23 1236          Plan of Care Review    Plan of Care Reviewed With patient  -SD     Progress improving  -SD     Outcome Evaluation OT tx completed. Patient sitting EOB upon therapist entering. Patient performed sit to stad and tf to BSC with CGA. Performed toileting task with min A to shane pull up. Patient performed tf from BSC to chair CGA. S/U with lunch tray. O2 ranged 90, 86, 90 on 3L O2. Patient S/U with lunch. Notifed RN patient requesting breathing treatment.  -SD       Row Name 06/07/23 1236          Vital Signs    Pre SpO2 (%) 90  -SD     O2 Delivery Pre Treatment supplemental O2  -SD     Intra SpO2 (%) 86  -SD     O2 Delivery Intra Treatment supplemental O2  -SD     Post SpO2 (%) 90  -SD     O2 Delivery Post Treatment supplemental O2  -SD       Row Name 06/07/23 1236          Positioning and Restraints    Pre-Treatment Position in bed  -SD     Post  Treatment Position chair  -SD     In Chair sitting;call light within reach;encouraged to call for assist;notified nsg  -SD               User Key  (r) = Recorded By, (t) = Taken By, (c) = Cosigned By      Initials Name Provider Type    Kaleigh Olvera OT Occupational Therapist                   Outcome Measures       Row Name 06/07/23 1240          How much help from another is currently needed...    Putting on and taking off regular lower body clothing? 3  -SD     Bathing (including washing, rinsing, and drying) 2  -SD     Toileting (which includes using toilet bed pan or urinal) 3  -SD     Putting on and taking off regular upper body clothing 3  -SD     Taking care of personal grooming (such as brushing teeth) 3  -SD     Eating meals 3  -SD     AM-PAC 6 Clicks Score (OT) 17  -SD       Row Name 06/07/23 0800          How much help from another person do you currently need...    Turning from your back to your side while in flat bed without using bedrails? 3  -LA     Moving from lying on back to sitting on the side of a flat bed without bedrails? 3  -LA     Moving to and from a bed to a chair (including a wheelchair)? 3  -LA     Standing up from a chair using your arms (e.g., wheelchair, bedside chair)? 3  -LA     Climbing 3-5 steps with a railing? 2  -LA     To walk in hospital room? 3  -LA     AM-PAC 6 Clicks Score (PT) 17  -LA     Highest level of mobility 5 --> Static standing  -LA       Row Name 06/07/23 1240          Functional Assessment    Outcome Measure Options AM-PAC 6 Clicks Daily Activity (OT)  -SD               User Key  (r) = Recorded By, (t) = Taken By, (c) = Cosigned By      Initials Name Provider Type    Kaleigh Olvera OT Occupational Therapist    Katiuska Arana, RN Registered Nurse                    Occupational Therapy Education       Title: PT OT SLP Therapies (Resolved)       Topic: Occupational Therapy (Resolved)       Point: ADL training (Resolved)       Description:   Instruct  learner(s) on proper safety adaptation and remediation techniques during self care or transfers.   Instruct in proper use of assistive devices.                  Learning Progress Summary             Patient Acceptance, E,TB, VU by  at 6/6/2023 6643    Comment: Role of OT/POC                         Point: Home exercise program (Resolved)       Description:   Instruct learner(s) on appropriate technique for monitoring, assisting and/or progressing therapeutic exercises/activities.                  Learner Progress:  Not documented in this visit.              Point: Precautions (Resolved)       Description:   Instruct learner(s) on prescribed precautions during self-care and functional transfers.                  Learner Progress:  Not documented in this visit.              Point: Body mechanics (Resolved)       Description:   Instruct learner(s) on proper positioning and spine alignment during self-care, functional mobility activities and/or exercises.                  Learner Progress:  Not documented in this visit.                              User Key       Initials Effective Dates Name Provider Type Discipline     06/16/21 -  Nadine Asencio Occupational Therapist OT                  OT Recommendation and Plan     Plan of Care Review  Plan of Care Reviewed With: patient  Progress: improving  Outcome Evaluation: OT tx completed. Patient sitting EOB upon therapist entering. Patient performed sit to stad and tf to BSC with CGA. Performed toileting task with min A to shane pull up. Patient performed tf from BSC to chair CGA. S/U with lunch tray. O2 ranged 90, 86, 90 on 3L O2. Patient S/U with lunch. Notifed RN patient requesting breathing treatment.     Time Calculation:    Time Calculation- OT       Row Name 06/07/23 1240             Time Calculation- OT    OT Start Time 1133  -SD      OT Stop Time 1145  -SD      OT Time Calculation (min) 12 min  -SD      OT Received On 06/07/23  -SD      OT Goal Re-Cert Due Date  06/16/23  -SD         Timed Charges    68982 - OT Self Care/Mgmt Minutes 12  -SD         Total Minutes    Timed Charges Total Minutes 12  -SD       Total Minutes 12  -SD                User Key  (r) = Recorded By, (t) = Taken By, (c) = Cosigned By      Initials Name Provider Type    Kaleigh Olvera OT Occupational Therapist                  Therapy Charges for Today       Code Description Service Date Service Provider Modifiers Qty    [id number] HC OT SELF CARE/MGMT/TRAIN EA 15 MIN 6/7/2023 Kaleigh Zaragoza OT GO 1                 Kaleigh Zaragoza OT  6/7/2023

## 2023-06-07 NOTE — CASE MANAGEMENT/SOCIAL WORK
Discharge Planning Assessment  [facility]     Patient Name: Claudia Stokes  MRN: [id number]  Today's Date: 6/7/2023    Admit Date: 6/5/2023    Plan: Pt plans to return home at d/c.  Pt's son will provide d/c transport.   Discharge Needs Assessment    No documentation.                  Discharge Plan       Row Name 06/07/23 1352       Plan    Final Discharge Disposition Code 01 - home or self-care                  Continued Care and Services - Admitted Since 6/5/2023       Durable Medical Equipment Coordination complete.      Service Provider Request Status Selected Services Address Phone Fax Patient Preferred    [facility] [place]  Selected Durable Medical Equipment [address] [place] [address] [phone] --       Internal Comment last updated by Rosenda Vega RN 6/7/2023 1350                                 Selected Continued Care - Prior Encounters Includes continued care and service providers with selected services from prior encounters from 3/7/2023 to 6/7/2023      Discharged on 3/24/2023 Admission date: 3/21/2023 - Discharge disposition: Home or Self Care      Durable Medical Equipment       Service Provider Selected Services Address Phone Fax Patient Preferred    [facility] [place] Durable Medical Equipment [address] [address] [phone] --       Internal Comment last updated by Rosenda Vega RN 3/23/2023 1430    Rollator walker  Pt utilized [facility] in [place]/service transfer to [place]                                     Expected Discharge Date and Time       Expected Discharge Date Expected Discharge Time    Jun 7, 2023            Demographic Summary    No documentation.                  Functional Status    No documentation.                  Psychosocial    No documentation.                  Abuse/Neglect    No documentation.                  Legal    No documentation.                  Substance Abuse    No  documentation.                  Patient Forms    No documentation.                     Rosenda Vega RN

## 2023-06-07 NOTE — OUTREACH NOTE
Prep Survey      Flowsheet Row Responses   [facility] patient discharged from? [place]   Is LACE score < 7 ? No   Eligibility [facility]   Date of Admission 06/05/23   Date of Discharge 06/07/23   Discharge Disposition Home or Self Care   Discharge diagnosis Confusion   Does the patient have one of the following disease processes/diagnoses(primary or secondary)? Other   Does the patient have Home health ordered? No   Is there a DME ordered? Yes   What DME was ordered? [facility] - wheelchair   Prep survey completed? Yes            Vania FENG - Registered Nurse

## 2023-06-07 NOTE — PLAN OF CARE
Goal Outcome Evaluation:  Plan of Care Reviewed With: (P) patient        Progress: (P) no change  Outcome Evaluation: (P) No acute event over night. patient remains oriented times 4 VSS continue to monitor

## 2023-06-08 ENCOUNTER — TRANSITIONAL CARE MANAGEMENT TELEPHONE ENCOUNTER (OUTPATIENT)
Dept: CALL CENTER | Facility: HOSPITAL | Age: 73
End: 2023-06-08
Payer: MEDICARE

## 2023-06-08 RX ORDER — BUPROPION HYDROCHLORIDE 150 MG/1
TABLET ORAL
Qty: 90 TABLET | Refills: 3 | OUTPATIENT
Start: 2023-06-08

## 2023-06-08 RX ORDER — LISINOPRIL 10 MG/1
TABLET ORAL
Qty: 90 TABLET | Refills: 3 | Status: SHIPPED | OUTPATIENT
Start: 2023-06-08

## 2023-06-08 NOTE — PAYOR COMM NOTE
"To:  Marley  From: Kimberly Blas RN  Phone: [phone]  Fax: [phone]  NPI: [id number]  TIN: [id number]  Member ID: [id number]  MRN: [id number]    Deisi Alvarez (73 y.o. Female)       Date of Birth   1950    Social Security Number       Address   [address] [place], [place] [address]    Home Phone   [phone]    MRN   [id number]       Muslim   None    Marital Status   Single                            Admission Date   6/5/23    Admission Type   Emergency    Admitting Provider   Javad Abebe DO    Attending Provider       Department, Room/Bed   [facility] TELEMETRY 4, 425/1       Discharge Date   6/7/2023    Discharge Disposition   Home or Self Care    Discharge Destination                                 Attending Provider: (none)   Allergies: Codeine, Sulfa Antibiotics, Tetracyclines & Related    Isolation: None   Infection: None   Code Status: Prior    Ht: 160 cm (63\")   Wt: 117 kg (257 lb 8 oz)    Admission Cmt: None   Principal Problem: Confusion [R41.0]                   Active Insurance as of 6/5/2023       Primary Coverage       Payor Plan Insurance Group Employer/Plan Group    ANTHEM MEDICARE REPLACEMENT ANTHEM MEDICARE ADVANTAGE [id number]       Payor Plan Address Payor Plan Phone Number Payor Plan Fax Number Effective Dates    [address] [phone]  11/1/2022 - None Entered    [facility] [address]         Subscriber Name Subscriber Birth Date Member ID       DEISI ALVAREZ 1950 [id number]               Secondary Coverage       Payor Plan Insurance Group Employer/Plan Group    ANTHEM MEDICAID ANTHEM MEDICAID [id number]       Payor Plan Address Payor Plan Phone Number Payor Plan Fax Number Effective Dates    [address] [address] [phone]  11/1/2022 - None Entered    [facility] [address]         Subscriber Name Subscriber Birth Date Member ID       DEISI ALVAREZ 1950 [id number]                     Emergency Contacts        (Rel.) Home " Phone Work Phone Mobile Phone    DES DUVAL (Daughter) [phone] -- --    Rodrigo Chong (Son) -- -- [phone]                 Discharge Summary        MisJavad mckeon  at 23 1402              [facility]   DISCHARGE SUMMARY      Name:  Claudia Stokes   Age:  73 y.o.  Sex:  female  :  1950  MRN:  [id number]   Visit Number:  [id number]    Admission Date:  2023  Date of Discharge:  2023  Primary Care Physician:  Troy Umaña MD        Discharge Diagnoses:     Acute on chronic hypoxic hypercapnic respiratory failure  COPD exacerbation  New onset A-fib    Problem List:     Active Hospital Problems    Diagnosis  POA    **Confusion [R41.0]  Yes    New onset atrial fibrillation [I48.91]  Yes    Hyponatremia [E87.1]  Yes    Acute respiratory failure with hypoxia and hypercapnia [J96.01, J96.02]  Yes    COPD with acute exacerbation [J44.1]  Yes      Resolved Hospital Problems   No resolved problems to display.     Presenting Problem:    Chief Complaint   Patient presents with    Fatigue      Consults:     Consulting Physician(s)               None            Procedures Performed:        History of presenting illness/Hospital Course:    Patient is a 73-year-old woman patient is a 73-year-old woman with past medical history of COPD on 3 L baseline, hypertension, mood disorder, GERD, anemia.  Presented to [facility] ED on 2023 with concern for confusion, lightheadedness, shortness of air onset 2 days.  She said that she had to turn her oxygen at home up to 5 L.   ED summary: Afebrile, vital signs stable on 3 L nasal cannula.  ABG respiratory acidosis pH 7.30, PCO2 67, PO2 76, bicarb 33.  She was put on BiPAP for the hypercapnia with improvement in mental status.  EKG atrial fibrillation, no ST elevations or depressions.  High-sensitivity troponin negative, proBNP not elevated, sodium 129, alk phos 150, AST 33, ALT 39.  No leukocytosis.  Blood cultures collected.  CXR  cardiomegaly, likely pulmonary edema, chronic bibasilar opacities.  CT head negative.   Patient was treated with Solu-Medrol DuoNebs and incentive spirometer.  No indication for antibiotics.  The following morning she was feeling much better, oxygen stable on home requirement and she was asking to go home.  Patient discharged with prescription for prednisone burst.    Additionally, cardiology consultation obtained for new onset A-fib suspect precipitated by COPD exacerbation.  [id number] score of 3.  2D echocardiogram unremarkable.  Patient discharged on anticoagulation with Eliquis.  She was also started on amiodarone per cardiology recommendations.    Patient scheduled for outpatient follow-up.  Requested to have her primary physician refer her to cardiologist.  She was also referred to COPD disease state management clinic.    Patient's mobility limitation impairs ability to participate in one or more of the following activities such as toileting, feeding, dressing, grooming and bathing. Patient's mobility limitation cannot be resolved by use of cane or walker. Patient is able to safely use a manual wheelchair. Patient's functional mobility deficit can be resolved by use of manual wheelchair. Patient can maneuver the chair independently or there is a caregiver to assist the patient with maneuvering. Patient has the strength to maneuver the wheelchair or patient has a caregiver that can propel the chair.       Vital Signs:    Temp:  [97.5 °F (36.4 °C)] 97.5 °F (36.4 °C)  Heart Rate:  [95] 95  Resp:  [18] 18  BP: (143)/(89) 143/89    Physical Exam:    General Appearance:  Alert and cooperative.    Head:  Atraumatic and normocephalic.   Eyes: Conjunctivae and sclerae normal, no icterus. No pallor.   Ears:  Ears with no abnormalities noted.   Throat: No oral lesions, no thrush, oral mucosa moist.   Neck: Supple, trachea midline, no thyromegaly.       Lungs:   Breath sounds heard bilaterally equally.  No crackles or  wheezing.    Heart:  Normal S1 and S2, no murmur, no gallop, no rub. No JVD.   Abdomen:   Normal bowel sounds, no masses, no organomegaly. Soft, nontender, nondistended, no rebound tenderness.   Extremities: Supple, no edema, no cyanosis, no clubbing.   Pulses: Pulses palpable bilaterally.   Skin: No bleeding or rash.   Neurologic: Alert and oriented x 3. No facial asymmetry. Moves all four limbs. No tremors.     Pertinent Lab Results:     Results from last 7 days   Lab Units 06/07/23 0622 06/06/23 0610 06/05/23 2057   SODIUM mmol/L 136 132* 129*   POTASSIUM mmol/L 4.7 5.8* 5.2   CHLORIDE mmol/L 93* 95* 91*   CO2 mmol/L 33.6* 27.9 29.7*   BUN mg/dL 25* 25* 31*   CREATININE mg/dL 0.74 0.75 0.83   CALCIUM mg/dL 9.5 9.0 8.8   BILIRUBIN mg/dL  --   --  0.3   ALK PHOS U/L  --   --  150*   ALT (SGPT) U/L  --   --  39*   AST (SGOT) U/L  --   --  33*   GLUCOSE mg/dL 148* 165* 112*     Results from last 7 days   Lab Units 06/07/23 0622 06/06/23 0610 06/05/23 2057   WBC 10*3/mm3 10.28 7.78 7.72   HEMOGLOBIN g/dL 10.4* 10.8* 10.5*   HEMATOCRIT % 34.8 36.4 35.3   PLATELETS 10*3/mm3 259 274 267         Results from last 7 days   Lab Units 06/05/23 2057   HSTROP T ng/L 7             Results from last 7 days   Lab Units 06/05/23 2057   LIPASE U/L 47     Results from last 7 days   Lab Units 06/05/23 2153   PH, ARTERIAL pH units 7.304*   PO2 ART mm Hg 76.0   PCO2, ARTERIAL mm Hg 67.5*   HCO3 ART mmol/L 33.5*     Results from last 7 days   Lab Units 06/06/23  0010 06/06/23  0005   BLOODCX  No growth at 2 days No growth at 2 days       Pertinent Radiology Results:    Imaging Results (All)       Procedure Component Value Units Date/Time    XR Chest 1 View [[id number]] Collected: 06/06/23 0739     Updated: 06/06/23 0801    Narrative:      CLINICAL INDICATION:    cough     EXAMINATION TECHNIQUE:   XR CHEST 1 VW-     COMPARISON:  Radiographs 03/21/2023. CT chest 05/12/2023.     FINDINGS:  Mild cardiomegaly. Bilateral perihilar  vascular engorgement and mild  interstitial edema. Trace-to-small bilateral pleural effusions. No  pneumothorax. Background emphysema. Prominent pulmonary lissa. No acute  osseous or soft tissue abnormality. No free air under hemidiaphragms.       Impression:      Findings are concerning for cardiogenic pulmonary edema/congestive heart  failure. Superimposed airspace disease cannot be excluded.           Images personally reviewed, interpreted and dictated by LYNSEY Awad.                This report was signed and finalized on 6/6/2023 7:59 AM by LYNSEY Awad.    CT Head Without Contrast [[id number]] Collected: 06/05/23 2354     Updated: 06/05/23 2356    Narrative:      FINAL REPORT    TECHNIQUE:  Axial CT images were performed through the head. Coronal  reformatted images were submitted. This study was performed with  techniques to keep radiation doses as low as reasonably  achievable (ALARA). Individualized dose reduction techniques  using automated exposure control or adjustment of mA and/or kV  according to the patient's size were employed.    CLINICAL HISTORY:  dizziness  FATIGUE AND WEAKNES    FINDINGS:  The ventricles are normal in size.  There is no evidence of  hemorrhage.  There is no mass or edema identified.  There is no  abnormal extra-axial fluid seen.  The sinuses are well aerated.      Impression:      No acute intracranial process.    Authenticated and Electronically Signed by Gokul Del Rosario MD  on 06/05/2023 11:54:59 PM            Echo:    Results for orders placed during the hospital encounter of 03/21/23    Adult Transthoracic Echo Complete With Contrast if Necessary Per Protocol    Interpretation Summary  1.  Normal left ventricular size and systolic function, LVEF 65-70%.  2.  Mild concentric LVH.  3.  Normal LV diastolic filling pattern.  4.  Normal right ventricular size and systolic function.  5.  Normal left atrial volume index.  6.  No significant valvular  abnormalities.    Condition on Discharge:      Stable.    Code status during the hospital stay:    Code Status and Medical Interventions:   Ordered at: 06/06/23 0304     Code Status (Patient has no pulse and is not breathing):    CPR (Attempt to Resuscitate)     Medical Interventions (Patient has pulse or is breathing):    Full Support     Discharge Disposition:    Home or Self Care    Discharge Medications:       Discharge Medications        New Medications        Instructions Start Date   amiodarone 200 MG tablet  Commonly known as: PACERONE   200 mg, Oral, Every 24 Hours Scheduled      Eliquis 5 MG tablet tablet  Generic drug: apixaban   5 mg, Oral, Every 12 Hours Scheduled      predniSONE 20 MG tablet  Commonly known as: DELTASONE   40 mg, Oral, Daily             Continue These Medications        Instructions Start Date   amLODIPine 2.5 MG tablet  Commonly known as: NORVASC   2.5 mg, Oral, Nightly      ARIPiprazole 2 MG tablet  Commonly known as: ABILIFY   2 mg, Oral, Daily      cyclobenzaprine 5 MG tablet  Commonly known as: FLEXERIL   5 mg, Oral, 3 Times Daily PRN      escitalopram 20 MG tablet  Commonly known as: LEXAPRO   20 mg, Oral, Daily      Fluticasone-Umeclidin-Vilant 100-62.5-25 MCG/ACT inhaler  Commonly known as: TRELEGY   1 puff, Inhalation, Daily - RT      furosemide 20 MG tablet  Commonly known as: LASIX   TAKE ONE TABLET BY MOUTH DAILY      ibuprofen 400 MG tablet  Commonly known as: ADVIL,MOTRIN   800 mg, Oral, 2 Times Daily PRN      lisinopril 10 MG tablet  Commonly known as: PRINIVIL,ZESTRIL   10 mg, Oral, Nightly      Magnesium Oxide 400 (240 Mg) MG tablet   400 mg, Oral, 2 Times Daily      omeprazole 40 MG capsule  Commonly known as: priLOSEC   40 mg, Oral, Nightly      potassium chloride 10 MEQ CR tablet   10 mEq, Oral, 2 Times Daily      pramipexole 1 MG tablet  Commonly known as: MIRAPEX   1 mg, Oral, 2 Times Daily PRN      rOPINIRole 3 MG tablet  Commonly known as: REQUIP   3 mg, Oral,  Nightly, Take 1 hour before bedtime.             ASK your doctor about these medications        Instructions Start Date   albuterol 1.25 MG/3ML nebulizer solution  Commonly known as: ACCUNEB  Ask about: Which instructions should I use?   1 ampule, Nebulization, Every 6 Hours PRN      albuterol sulfate  (90 Base) MCG/ACT inhaler  Commonly known as: PROVENTIL HFA;VENTOLIN HFA;PROAIR HFA  Ask about: Which instructions should I use?   Inhale 2 puffs by mouth every 4 to 6 hours as needed for cough      albuterol 1.25 MG/3ML nebulizer solution  Commonly known as: ACCUNEB  Ask about: Which instructions should I use?   Inhale contents of 1 vial (3mL) through a nebulizer Every 6 (Six) Hours As Needed.             Discharge Diet:     Diet Instructions       Diet: Cardiac Diets, Diabetic Diets; Healthy Heart (2-3 Na+); Regular Texture (IDDSI 7); Thin (IDDSI 0); Consistent Carbohydrate      Discharge Diet:  Cardiac Diets  Diabetic Diets       Cardiac Diet: Healthy Heart (2-3 Na+)    Texture: Regular Texture (IDDSI 7)    Fluid Consistency: Thin (IDDSI 0)    Diabetic Diet: Consistent Carbohydrate          Activity at Discharge:     Activity Instructions       Activity as Tolerated            Follow-up Appointments:    Additional Instructions for the Follow-ups that You Need to Schedule       Ambulatory Referral to Disease State Management   As directed      To dept: [facility] [[id number]]    What program(s) are you referring for?: COPD    Follow-up needed: Yes                Contact information for follow-up providers       Troy Umaña MD .    Specialty: Internal Medicine  Why: Hospital Follow up on Monday June 12, 2023 at 11:00AM  Contact information:  [address] [address]  [phone]                       Contact information for after-discharge care       Durable Medical Equipment       [facility] .    Service: Durable Medical Equipment  Contact information:  [address]  [facility] [address]  [phone]                                 Future Appointments   Date Time Provider Department Center   6/12/2023 11:00 AM Troy Umaña MD [facility]     Test Results Pending at Discharge:    Pending Labs       Order Current Status    Blood Culture - Blood, Arm, Left Preliminary result    Blood Culture - Blood, Arm, Right Preliminary result               Javad Abebe DO  06/08/23  09:27 EDT    Time: I spent >30 minutes on this discharge activity which included: face-to-face encounter with the patient, reviewing the data in the system, coordination of the care with the nursing staff as well as consultants, documentation, and entering orders.     Dictated utilizing Dragon dictation.        Electronically signed by Javad Abebe DO at 06/08/23 1014

## 2023-06-08 NOTE — OUTREACH NOTE
Call Center TCM Note      Flowsheet Row Responses   [facility] patient discharged from? [facility]   Does the patient have one of the following disease processes/diagnoses(primary or secondary)? Other   TCM attempt successful? Yes   Call start time 1230   Call end time 1231   Discharge diagnosis Confusion   Meds reviewed with patient/caregiver? Yes   Is the patient having any side effects they believe may be caused by any medication additions or changes? No   Does the patient have all medications ordered at discharge? Yes   Is the patient taking all medications as directed (includes completed medication regime)? Yes   Comments 6/12/2023 11:00 AM   Does the patient have an appointment with their PCP within 7 days of discharge? Yes   Has home health visited the patient within 72 hours of discharge? N/A   Psychosocial issues? No   Did the patient receive a copy of their discharge instructions? Yes   Nursing interventions Reviewed instructions with patient   What is the patient's perception of their health status since discharge? Improving   TCM call completed? Yes   Call end time 1231            Carina Chong RN    6/8/2023, 12:32 EDT

## 2023-06-08 NOTE — DISCHARGE SUMMARY
[facility]   DISCHARGE SUMMARY      Name:  Claudia Stokes   Age:  73 y.o.  Sex:  female  :  1950  MRN:  [id number]   Visit Number:  [id number]    Admission Date:  2023  Date of Discharge:  2023  Primary Care Physician:  Troy Umaña MD        Discharge Diagnoses:     Acute on chronic hypoxic hypercapnic respiratory failure  COPD exacerbation  New onset A-fib    Problem List:     Active Hospital Problems    Diagnosis  POA    **Confusion [R41.0]  Yes    New onset atrial fibrillation [I48.91]  Yes    Hyponatremia [E87.1]  Yes    Acute respiratory failure with hypoxia and hypercapnia [J96.01, J96.02]  Yes    COPD with acute exacerbation [J44.1]  Yes      Resolved Hospital Problems   No resolved problems to display.     Presenting Problem:    Chief Complaint   Patient presents with    Fatigue      Consults:     Consulting Physician(s)               None            Procedures Performed:        History of presenting illness/Hospital Course:    Patient is a 73-year-old woman patient is a 73-year-old woman with past medical history of COPD on 3 L baseline, hypertension, mood disorder, GERD, anemia.  Presented to [facility] ED on 2023 with concern for confusion, lightheadedness, shortness of air onset 2 days.  She said that she had to turn her oxygen at home up to 5 L.   ED summary: Afebrile, vital signs stable on 3 L nasal cannula.  ABG respiratory acidosis pH 7.30, PCO2 67, PO2 76, bicarb 33.  She was put on BiPAP for the hypercapnia with improvement in mental status.  EKG atrial fibrillation, no ST elevations or depressions.  High-sensitivity troponin negative, proBNP not elevated, sodium 129, alk phos 150, AST 33, ALT 39.  No leukocytosis.  Blood cultures collected.  CXR cardiomegaly, likely pulmonary edema, chronic bibasilar opacities.  CT head negative.   Patient was treated with Solu-Medrol DuoNebs and incentive spirometer.  No indication for antibiotics.  The following  morning she was feeling much better, oxygen stable on home requirement and she was asking to go home.  Patient discharged with prescription for prednisone burst.    Additionally, cardiology consultation obtained for new onset A-fib suspect precipitated by COPD exacerbation.  [id number] score of 3.  2D echocardiogram unremarkable.  Patient discharged on anticoagulation with Eliquis.  She was also started on amiodarone per cardiology recommendations.    Patient scheduled for outpatient follow-up.  Requested to have her primary physician refer her to cardiologist.  She was also referred to COPD disease state management clinic.    Patient's mobility limitation impairs ability to participate in one or more of the following activities such as toileting, feeding, dressing, grooming and bathing. Patient's mobility limitation cannot be resolved by use of cane or walker. Patient is able to safely use a manual wheelchair. Patient's functional mobility deficit can be resolved by use of manual wheelchair. Patient can maneuver the chair independently or there is a caregiver to assist the patient with maneuvering. Patient has the strength to maneuver the wheelchair or patient has a caregiver that can propel the chair.       Vital Signs:    Temp:  [97.5 °F (36.4 °C)] 97.5 °F (36.4 °C)  Heart Rate:  [95] 95  Resp:  [18] 18  BP: (143)/(89) 143/89    Physical Exam:    General Appearance:  Alert and cooperative.    Head:  Atraumatic and normocephalic.   Eyes: Conjunctivae and sclerae normal, no icterus. No pallor.   Ears:  Ears with no abnormalities noted.   Throat: No oral lesions, no thrush, oral mucosa moist.   Neck: Supple, trachea midline, no thyromegaly.       Lungs:   Breath sounds heard bilaterally equally.  No crackles or wheezing.    Heart:  Normal S1 and S2, no murmur, no gallop, no rub. No JVD.   Abdomen:   Normal bowel sounds, no masses, no organomegaly. Soft, nontender, nondistended, no rebound tenderness.    Extremities: Supple, no edema, no cyanosis, no clubbing.   Pulses: Pulses palpable bilaterally.   Skin: No bleeding or rash.   Neurologic: Alert and oriented x 3. No facial asymmetry. Moves all four limbs. No tremors.     Pertinent Lab Results:     Results from last 7 days   Lab Units 06/07/23 0622 06/06/23 0610 06/05/23 2057   SODIUM mmol/L 136 132* 129*   POTASSIUM mmol/L 4.7 5.8* 5.2   CHLORIDE mmol/L 93* 95* 91*   CO2 mmol/L 33.6* 27.9 29.7*   BUN mg/dL 25* 25* 31*   CREATININE mg/dL 0.74 0.75 0.83   CALCIUM mg/dL 9.5 9.0 8.8   BILIRUBIN mg/dL  --   --  0.3   ALK PHOS U/L  --   --  150*   ALT (SGPT) U/L  --   --  39*   AST (SGOT) U/L  --   --  33*   GLUCOSE mg/dL 148* 165* 112*     Results from last 7 days   Lab Units 06/07/23 0622 06/06/23 0610 06/05/23 2057   WBC 10*3/mm3 10.28 7.78 7.72   HEMOGLOBIN g/dL 10.4* 10.8* 10.5*   HEMATOCRIT % 34.8 36.4 35.3   PLATELETS 10*3/mm3 259 274 267         Results from last 7 days   Lab Units 06/05/23 2057   HSTROP T ng/L 7             Results from last 7 days   Lab Units 06/05/23 2057   LIPASE U/L 47     Results from last 7 days   Lab Units 06/05/23  2153   PH, ARTERIAL pH units 7.304*   PO2 ART mm Hg 76.0   PCO2, ARTERIAL mm Hg 67.5*   HCO3 ART mmol/L 33.5*     Results from last 7 days   Lab Units 06/06/23  0010 06/06/23  0005   BLOODCX  No growth at 2 days No growth at 2 days       Pertinent Radiology Results:    Imaging Results (All)       Procedure Component Value Units Date/Time    XR Chest 1 View [[id number]] Collected: 06/06/23 0739     Updated: 06/06/23 0801    Narrative:      CLINICAL INDICATION:    cough     EXAMINATION TECHNIQUE:   XR CHEST 1 VW-     COMPARISON:  Radiographs 03/21/2023. CT chest 05/12/2023.     FINDINGS:  Mild cardiomegaly. Bilateral perihilar vascular engorgement and mild  interstitial edema. Trace-to-small bilateral pleural effusions. No  pneumothorax. Background emphysema. Prominent pulmonary lissa. No acute  osseous or soft tissue  abnormality. No free air under hemidiaphragms.       Impression:      Findings are concerning for cardiogenic pulmonary edema/congestive heart  failure. Superimposed airspace disease cannot be excluded.           Images personally reviewed, interpreted and dictated by LYNSEY Awad.                This report was signed and finalized on 6/6/2023 7:59 AM by LYNSEY Awad.    CT Head Without Contrast [[id number]] Collected: 06/05/23 2354     Updated: 06/05/23 2356    Narrative:      FINAL REPORT    TECHNIQUE:  Axial CT images were performed through the head. Coronal  reformatted images were submitted. This study was performed with  techniques to keep radiation doses as low as reasonably  achievable (ALARA). Individualized dose reduction techniques  using automated exposure control or adjustment of mA and/or kV  according to the patient's size were employed.    CLINICAL HISTORY:  dizziness  FATIGUE AND WEAKNES    FINDINGS:  The ventricles are normal in size.  There is no evidence of  hemorrhage.  There is no mass or edema identified.  There is no  abnormal extra-axial fluid seen.  The sinuses are well aerated.      Impression:      No acute intracranial process.    Authenticated and Electronically Signed by Gokul Del Rosario MD  on 06/05/2023 11:54:59 PM            Echo:    Results for orders placed during the hospital encounter of 03/21/23    Adult Transthoracic Echo Complete With Contrast if Necessary Per Protocol    Interpretation Summary  1.  Normal left ventricular size and systolic function, LVEF 65-70%.  2.  Mild concentric LVH.  3.  Normal LV diastolic filling pattern.  4.  Normal right ventricular size and systolic function.  5.  Normal left atrial volume index.  6.  No significant valvular abnormalities.    Condition on Discharge:      Stable.    Code status during the hospital stay:    Code Status and Medical Interventions:   Ordered at: 06/06/23 0304     Code Status (Patient has no pulse and  is not breathing):    CPR (Attempt to Resuscitate)     Medical Interventions (Patient has pulse or is breathing):    Full Support     Discharge Disposition:    Home or Self Care    Discharge Medications:       Discharge Medications        New Medications        Instructions Start Date   amiodarone 200 MG tablet  Commonly known as: PACERONE   200 mg, Oral, Every 24 Hours Scheduled      Eliquis 5 MG tablet tablet  Generic drug: apixaban   5 mg, Oral, Every 12 Hours Scheduled      predniSONE 20 MG tablet  Commonly known as: DELTASONE   40 mg, Oral, Daily             Continue These Medications        Instructions Start Date   amLODIPine 2.5 MG tablet  Commonly known as: NORVASC   2.5 mg, Oral, Nightly      ARIPiprazole 2 MG tablet  Commonly known as: ABILIFY   2 mg, Oral, Daily      cyclobenzaprine 5 MG tablet  Commonly known as: FLEXERIL   5 mg, Oral, 3 Times Daily PRN      escitalopram 20 MG tablet  Commonly known as: LEXAPRO   20 mg, Oral, Daily      Fluticasone-Umeclidin-Vilant 100-62.5-25 MCG/ACT inhaler  Commonly known as: TRELEGY   1 puff, Inhalation, Daily - RT      furosemide 20 MG tablet  Commonly known as: LASIX   TAKE ONE TABLET BY MOUTH DAILY      ibuprofen 400 MG tablet  Commonly known as: ADVIL,MOTRIN   800 mg, Oral, 2 Times Daily PRN      lisinopril 10 MG tablet  Commonly known as: PRINIVIL,ZESTRIL   10 mg, Oral, Nightly      Magnesium Oxide 400 (240 Mg) MG tablet   400 mg, Oral, 2 Times Daily      omeprazole 40 MG capsule  Commonly known as: priLOSEC   40 mg, Oral, Nightly      potassium chloride 10 MEQ CR tablet   10 mEq, Oral, 2 Times Daily      pramipexole 1 MG tablet  Commonly known as: MIRAPEX   1 mg, Oral, 2 Times Daily PRN      rOPINIRole 3 MG tablet  Commonly known as: REQUIP   3 mg, Oral, Nightly, Take 1 hour before bedtime.             ASK your doctor about these medications        Instructions Start Date   albuterol 1.25 MG/3ML nebulizer solution  Commonly known as: ACCUNEB  Ask about: Which  instructions should I use?   1 ampule, Nebulization, Every 6 Hours PRN      albuterol sulfate  (90 Base) MCG/ACT inhaler  Commonly known as: PROVENTIL HFA;VENTOLIN HFA;PROAIR HFA  Ask about: Which instructions should I use?   Inhale 2 puffs by mouth every 4 to 6 hours as needed for cough      albuterol 1.25 MG/3ML nebulizer solution  Commonly known as: ACCUNEB  Ask about: Which instructions should I use?   Inhale contents of 1 vial (3mL) through a nebulizer Every 6 (Six) Hours As Needed.             Discharge Diet:     Diet Instructions       Diet: Cardiac Diets, Diabetic Diets; Healthy Heart (2-3 Na+); Regular Texture (IDDSI 7); Thin (IDDSI 0); Consistent Carbohydrate      Discharge Diet:  Cardiac Diets  Diabetic Diets       Cardiac Diet: Healthy Heart (2-3 Na+)    Texture: Regular Texture (IDDSI 7)    Fluid Consistency: Thin (IDDSI 0)    Diabetic Diet: Consistent Carbohydrate          Activity at Discharge:     Activity Instructions       Activity as Tolerated            Follow-up Appointments:    Additional Instructions for the Follow-ups that You Need to Schedule       Ambulatory Referral to Disease State Management   As directed      To dept: [facility] [[id number]]    What program(s) are you referring for?: COPD    Follow-up needed: Yes                Contact information for follow-up providers       Troy Umaña MD .    Specialty: Internal Medicine  Why: Hospital Follow up on Monday June 12, 2023 at 11:00AM  Contact information:  [address]  [facility] [address]  [phone]                       Contact information for after-discharge care       Durable Medical Equipment       [facility] .    Service: Durable Medical Equipment  Contact information:  [address]  [facility] [address]  [phone]                                 Future Appointments   Date Time Provider Department Center   6/12/2023 11:00 AM Troy Umaña MD [facility]     Test  Results Pending at Discharge:    Pending Labs       Order Current Status    Blood Culture - Blood, Arm, Left Preliminary result    Blood Culture - Blood, Arm, Right Preliminary result               Javad Abebe DO  06/08/23  09:27 EDT    Time: I spent >30 minutes on this discharge activity which included: face-to-face encounter with the patient, reviewing the data in the system, coordination of the care with the nursing staff as well as consultants, documentation, and entering orders.     Dictated utilizing Dragon dictation.

## 2023-06-08 NOTE — OUTREACH NOTE
Call Center TCM Note      Flowsheet Row Responses   [facility] patient discharged from? [facility]   Does the patient have one of the following disease processes/diagnoses(primary or secondary)? Other   TCM attempt successful? No  [Rodrigo]   Unsuccessful attempts Attempt 1            Carina Chong RN    6/8/2023, 12:04 EDT

## 2023-06-09 NOTE — TELEPHONE ENCOUNTER
Caller: Claudia Stokes    Relationship: Self    Best call back number:  [phone]    Requested Prescriptions:   Requested Prescriptions      No prescriptions requested or ordered in this encounter        escitalopram (LEXAPRO) 20 MG tablet     Pharmacy where request should be sent: [facility] [id number] [facility] [address] AT [facility] [phone] [facility] [phone] FX     Last office visit with prescribing clinician: 4/11/2023   Last telemedicine visit with prescribing clinician: Visit date not found   Next office visit with prescribing clinician: 6/12/2023     Additional details provided by patient:     Does the patient have less than a 3 day supply:  [x] Yes  [] No    Would you like a call back once the refill request has been completed: [] Yes [x] No    If the office needs to give you a call back, can they leave a voicemail: [] Yes [] No

## 2023-06-10 NOTE — TELEPHONE ENCOUNTER
Rx Refill Note  Requested Prescriptions     Pending Prescriptions Disp Refills    escitalopram (LEXAPRO) 20 MG tablet       Sig: Take 1 tablet by mouth Daily.      Last office visit with prescribing clinician: 4/11/2023  Next office visit with prescribing clinician: 6/12/2023     Comes from a historical provider would you like to take over prescribing?    {    Marlene Raymundo MA  06/10/23, 09:22 EDT

## 2023-06-11 LAB
BACTERIA SPEC AEROBE CULT: NORMAL
BACTERIA SPEC AEROBE CULT: NORMAL

## 2023-06-11 RX ORDER — ESCITALOPRAM OXALATE 20 MG/1
20 TABLET ORAL DAILY
Qty: 30 TABLET | Refills: 3 | Status: CANCELLED | OUTPATIENT
Start: 2023-06-11

## 2023-06-12 ENCOUNTER — APPOINTMENT (OUTPATIENT)
Dept: GENERAL RADIOLOGY | Facility: HOSPITAL | Age: 73
End: 2023-06-12
Payer: MEDICARE

## 2023-06-12 ENCOUNTER — OFFICE VISIT (OUTPATIENT)
Dept: INTERNAL MEDICINE | Facility: CLINIC | Age: 73
End: 2023-06-12
Payer: MEDICARE

## 2023-06-12 ENCOUNTER — HOSPITAL ENCOUNTER (EMERGENCY)
Facility: HOSPITAL | Age: 73
Discharge: HOME | End: 2023-06-12
Attending: EMERGENCY MEDICINE | Admitting: EMERGENCY MEDICINE
Payer: MEDICARE

## 2023-06-12 VITALS
TEMPERATURE: 97.4 F | WEIGHT: 225 LBS | OXYGEN SATURATION: 98 % | RESPIRATION RATE: 20 BRPM | SYSTOLIC BLOOD PRESSURE: 160 MMHG | BODY MASS INDEX: 39.87 KG/M2 | DIASTOLIC BLOOD PRESSURE: 103 MMHG | HEART RATE: 92 BPM | HEIGHT: 63 IN

## 2023-06-12 VITALS
SYSTOLIC BLOOD PRESSURE: 140 MMHG | HEART RATE: 99 BPM | DIASTOLIC BLOOD PRESSURE: 86 MMHG | WEIGHT: 225 LBS | TEMPERATURE: 97.5 F | HEIGHT: 63 IN | OXYGEN SATURATION: 88 % | BODY MASS INDEX: 39.87 KG/M2

## 2023-06-12 DIAGNOSIS — M17.0 PRIMARY OSTEOARTHRITIS OF BOTH KNEES: ICD-10-CM

## 2023-06-12 DIAGNOSIS — G25.81 RLS (RESTLESS LEGS SYNDROME): ICD-10-CM

## 2023-06-12 DIAGNOSIS — J44.1 COPD WITH ACUTE EXACERBATION: ICD-10-CM

## 2023-06-12 DIAGNOSIS — D64.9 ANEMIA, UNSPECIFIED TYPE: ICD-10-CM

## 2023-06-12 DIAGNOSIS — Z78.9 IMPAIRED MOBILITY AND ADLS: Chronic | ICD-10-CM

## 2023-06-12 DIAGNOSIS — I48.19 PERSISTENT ATRIAL FIBRILLATION: ICD-10-CM

## 2023-06-12 DIAGNOSIS — I10 PRIMARY HYPERTENSION: ICD-10-CM

## 2023-06-12 DIAGNOSIS — E87.1 HYPONATREMIA: ICD-10-CM

## 2023-06-12 DIAGNOSIS — F41.8 DEPRESSION WITH ANXIETY: ICD-10-CM

## 2023-06-12 DIAGNOSIS — R00.2 PALPITATION: ICD-10-CM

## 2023-06-12 DIAGNOSIS — I48.0 PAROXYSMAL ATRIAL FIBRILLATION: Primary | ICD-10-CM

## 2023-06-12 DIAGNOSIS — Z74.09 IMPAIRED MOBILITY AND ADLS: Chronic | ICD-10-CM

## 2023-06-12 DIAGNOSIS — I48.91 NEW ONSET ATRIAL FIBRILLATION: Primary | ICD-10-CM

## 2023-06-12 LAB
ALBUMIN SERPL-MCNC: 4.7 G/DL (ref 3.5–5.2)
ALBUMIN/GLOB SERPL: 1.6 G/DL
ALP SERPL-CCNC: 142 U/L (ref 39–117)
ALT SERPL W P-5'-P-CCNC: 98 U/L (ref 1–33)
ANION GAP SERPL CALCULATED.3IONS-SCNC: 9.2 MMOL/L (ref 5–15)
ANISOCYTOSIS BLD QL: NORMAL
AST SERPL-CCNC: 57 U/L (ref 1–32)
BASOPHILS # BLD AUTO: 0.03 10*3/MM3 (ref 0–0.2)
BASOPHILS NFR BLD AUTO: 0.2 % (ref 0–1.5)
BILIRUB SERPL-MCNC: 0.5 MG/DL (ref 0–1.2)
BUN SERPL-MCNC: 16 MG/DL (ref 8–23)
BUN/CREAT SERPL: 21.1 (ref 7–25)
CALCIUM SPEC-SCNC: 9.5 MG/DL (ref 8.6–10.5)
CHLORIDE SERPL-SCNC: 92 MMOL/L (ref 98–107)
CO2 SERPL-SCNC: 37.8 MMOL/L (ref 22–29)
CREAT SERPL-MCNC: 0.76 MG/DL (ref 0.57–1)
DEPRECATED RDW RBC AUTO: 48.7 FL (ref 37–54)
EGFRCR SERPLBLD CKD-EPI 2021: 83.4 ML/MIN/1.73
ELLIPTOCYTES BLD QL SMEAR: NORMAL
EOSINOPHIL # BLD AUTO: 0 10*3/MM3 (ref 0–0.4)
EOSINOPHIL NFR BLD AUTO: 0 % (ref 0.3–6.2)
ERYTHROCYTE [DISTWIDTH] IN BLOOD BY AUTOMATED COUNT: 15.5 % (ref 12.3–15.4)
GLOBULIN UR ELPH-MCNC: 2.9 GM/DL
GLUCOSE SERPL-MCNC: 135 MG/DL (ref 65–99)
HCT VFR BLD AUTO: 41.1 % (ref 34–46.6)
HGB BLD-MCNC: 12.2 G/DL (ref 12–15.9)
HOLD SPECIMEN: NORMAL
HOLD SPECIMEN: NORMAL
HYPOCHROMIA BLD QL: NORMAL
IMM GRANULOCYTES # BLD AUTO: 0.09 10*3/MM3 (ref 0–0.05)
IMM GRANULOCYTES NFR BLD AUTO: 0.7 % (ref 0–0.5)
LYMPHOCYTES # BLD AUTO: 0.5 10*3/MM3 (ref 0.7–3.1)
LYMPHOCYTES NFR BLD AUTO: 3.8 % (ref 19.6–45.3)
MAGNESIUM SERPL-MCNC: 2.3 MG/DL (ref 1.6–2.4)
MCH RBC QN AUTO: 25.5 PG (ref 26.6–33)
MCHC RBC AUTO-ENTMCNC: 29.7 G/DL (ref 31.5–35.7)
MCV RBC AUTO: 86 FL (ref 79–97)
MONOCYTES # BLD AUTO: 0.16 10*3/MM3 (ref 0.1–0.9)
MONOCYTES NFR BLD AUTO: 1.2 % (ref 5–12)
NEUTROPHILS NFR BLD AUTO: 12.4 10*3/MM3 (ref 1.7–7)
NEUTROPHILS NFR BLD AUTO: 94.1 % (ref 42.7–76)
NRBC BLD AUTO-RTO: 0 /100 WBC (ref 0–0.2)
PLATELET # BLD AUTO: 338 10*3/MM3 (ref 140–450)
PMV BLD AUTO: 9.1 FL (ref 6–12)
POIKILOCYTOSIS BLD QL SMEAR: NORMAL
POTASSIUM SERPL-SCNC: 4.7 MMOL/L (ref 3.5–5.2)
PROT SERPL-MCNC: 7.6 G/DL (ref 6–8.5)
RBC # BLD AUTO: 4.78 10*6/MM3 (ref 3.77–5.28)
SMALL PLATELETS BLD QL SMEAR: ADEQUATE
SODIUM SERPL-SCNC: 139 MMOL/L (ref 136–145)
STOMATOCYTES BLD QL SMEAR: NORMAL
TROPONIN T SERPL HS-MCNC: 6 NG/L
WBC MORPH BLD: NORMAL
WBC NRBC COR # BLD: 13.18 10*3/MM3 (ref 3.4–10.8)
WHOLE BLOOD HOLD COAG: NORMAL
WHOLE BLOOD HOLD SPECIMEN: NORMAL

## 2023-06-12 PROCEDURE — 85025 COMPLETE CBC W/AUTO DIFF WBC: CPT | Performed by: EMERGENCY MEDICINE

## 2023-06-12 PROCEDURE — 71045 X-RAY EXAM CHEST 1 VIEW: CPT

## 2023-06-12 PROCEDURE — 80053 COMPREHEN METABOLIC PANEL: CPT | Performed by: EMERGENCY MEDICINE

## 2023-06-12 PROCEDURE — 83735 ASSAY OF MAGNESIUM: CPT | Performed by: EMERGENCY MEDICINE

## 2023-06-12 PROCEDURE — 99284 EMERGENCY DEPT VISIT MOD MDM: CPT

## 2023-06-12 PROCEDURE — 93005 ELECTROCARDIOGRAM TRACING: CPT | Performed by: EMERGENCY MEDICINE

## 2023-06-12 PROCEDURE — 36415 COLL VENOUS BLD VENIPUNCTURE: CPT

## 2023-06-12 PROCEDURE — 85007 BL SMEAR W/DIFF WBC COUNT: CPT | Performed by: EMERGENCY MEDICINE

## 2023-06-12 PROCEDURE — 84484 ASSAY OF TROPONIN QUANT: CPT | Performed by: EMERGENCY MEDICINE

## 2023-06-12 PROCEDURE — 96374 THER/PROPH/DIAG INJ IV PUSH: CPT

## 2023-06-12 RX ORDER — ALBUTEROL SULFATE 90 UG/1
AEROSOL, METERED RESPIRATORY (INHALATION)
Qty: 18 G | Refills: 6 | Status: SHIPPED | OUTPATIENT
Start: 2023-06-12

## 2023-06-12 RX ORDER — METOPROLOL SUCCINATE 50 MG/1
50 TABLET, EXTENDED RELEASE ORAL DAILY
Qty: 30 TABLET | Refills: 0 | Status: SHIPPED | OUTPATIENT
Start: 2023-06-12

## 2023-06-12 RX ORDER — DULOXETIN HYDROCHLORIDE 60 MG/1
60 CAPSULE, DELAYED RELEASE ORAL 2 TIMES DAILY
Qty: 60 CAPSULE | Refills: 1 | Status: SHIPPED | OUTPATIENT
Start: 2023-06-12

## 2023-06-12 RX ORDER — ROPINIROLE 3 MG/1
3 TABLET, FILM COATED ORAL NIGHTLY
Qty: 90 TABLET | Refills: 3 | Status: SHIPPED | OUTPATIENT
Start: 2023-06-12

## 2023-06-12 RX ORDER — SODIUM CHLORIDE 0.9 % (FLUSH) 0.9 %
10 SYRINGE (ML) INJECTION AS NEEDED
Status: DISCONTINUED | OUTPATIENT
Start: 2023-06-12 | End: 2023-06-12 | Stop reason: HOSPADM

## 2023-06-12 RX ORDER — METOPROLOL TARTRATE 50 MG/1
50 TABLET, FILM COATED ORAL ONCE
Status: COMPLETED | OUTPATIENT
Start: 2023-06-12 | End: 2023-06-12

## 2023-06-12 RX ORDER — METOPROLOL SUCCINATE 25 MG/1
25 TABLET, EXTENDED RELEASE ORAL DAILY
Qty: 30 TABLET | Refills: 2 | Status: CANCELLED | OUTPATIENT
Start: 2023-06-12

## 2023-06-12 RX ORDER — AMLODIPINE BESYLATE 2.5 MG/1
2.5 TABLET ORAL NIGHTLY
Qty: 90 TABLET | Refills: 3 | Status: SHIPPED | OUTPATIENT
Start: 2023-06-12

## 2023-06-12 RX ORDER — ESCITALOPRAM OXALATE 20 MG/1
20 TABLET ORAL DAILY
Qty: 90 TABLET | Refills: 1 | Status: CANCELLED | OUTPATIENT
Start: 2023-06-12

## 2023-06-12 RX ORDER — METOPROLOL TARTRATE 5 MG/5ML
5 INJECTION INTRAVENOUS ONCE
Status: COMPLETED | OUTPATIENT
Start: 2023-06-12 | End: 2023-06-12

## 2023-06-12 RX ORDER — ALBUTEROL SULFATE 2.5 MG/3ML
2.5 SOLUTION RESPIRATORY (INHALATION)
COMMUNITY
Start: 2023-04-02

## 2023-06-12 RX ORDER — CYCLOBENZAPRINE HCL 5 MG
5 TABLET ORAL 3 TIMES DAILY PRN
Qty: 30 TABLET | Refills: 0 | Status: CANCELLED | OUTPATIENT
Start: 2023-06-12

## 2023-06-12 RX ORDER — METOPROLOL TARTRATE 5 MG/5ML
5 INJECTION INTRAVENOUS ONCE
Status: DISCONTINUED | OUTPATIENT
Start: 2023-06-12 | End: 2023-06-12 | Stop reason: HOSPADM

## 2023-06-12 RX ADMIN — METOPROLOL TARTRATE 50 MG: 50 TABLET ORAL at 13:18

## 2023-06-12 RX ADMIN — METOPROLOL TARTRATE 5 MG: 5 INJECTION, SOLUTION INTRAVENOUS at 13:18

## 2023-06-12 NOTE — ED PROVIDER NOTES
"  HPI: Claudia Stokes is a 73 y.o. female who presents to the emergency department complaining of palpitations.  Patient states that she was recently admitted to the hospital for atrial fibrillation.  She has continued to have palpitations, followed up with her primary doctor today and was told to come to the ED because her heart rate is still too fast.  She states that she was supposed to be put on a \"blocker\" but was not.  She denies any other complaints or concerns currently.      REVIEW OF SYSTEMS: All other systems reviewed and are negative     PAST MEDICAL HISTORY:   Past Medical History:   Diagnosis Date    A-fib     COPD (chronic obstructive pulmonary disease)     Hypertension     Impaired functional mobility, balance, gait, and endurance         FAMILY HISTORY:   History reviewed. No pertinent family history.     SOCIAL HISTORY:   Social History     Socioeconomic History    Marital status: Single   Tobacco Use    Smoking status: Former     Packs/day: 1.00     Years: 25.00     Pack years: 25.00     Types: Cigarettes     Quit date:      Years since quittin.4    Smokeless tobacco: Never   Vaping Use    Vaping Use: Never used   Substance and Sexual Activity    Alcohol use: Yes     Comment: occassional    Drug use: Never    Sexual activity: Defer        SURGICAL HISTORY:   Past Surgical History:   Procedure Laterality Date    BREAST BIOPSY      VENA CAVA FILTER PLACEMENT          ALLERGIES: Codeine, Sulfa antibiotics, and Tetracyclines & related       PHYSICAL EXAM:   VITAL SIGNS:   Vitals:    23 1430   BP:    Pulse: 92   Resp:    Temp:    SpO2: 98%      CONSTITUTIONAL: Awake, well appearing, nontoxic   HENT: Atraumatic, normocephalic, oral mucosa moist, airway patent. Nares patent without drainage. External ears normal.   EYES: Conjunctivae clear, EOMI, PERRL   NECK: Trachea midline, nontender, supple   CARDIOVASCULAR: Irregularly irregular, rate 110s to 120s  PULMONARY/CHEST: Normal work of " breathing. Clear to auscultation, no rhonchi, wheezes, or rales.  ABDOMINAL: Nondistended, soft, nontender, no rebound or guarding.  NEUROLOGIC: Nonfocal, moves all four extremities, no gross sensory or motor deficits.   EXTREMITIES: No clubbing, cyanosis, or edema   SKIN: Warm, Dry, No erythema, No rash       ED COURSE / MEDICAL DECISION MAKING:     Claudia Stokes is a 73 y.o. female who presents to the emergency department for evaluation of palpitations.  Well-developed, well-nourished obese lady in no distress with exam as above.  Her vital signs are notable for an irregular tachycardia.  Her exam is otherwise relatively nonfocal.  Her oxygen saturation is normal on her home O2 at 93%.  Will obtain basic labs and give beta-blocker.  We will continue to monitor.  Disposition pending.    Differential diagnosis includes atrial fibrillation, electrolyte derangement, anxiety, ACS among other etiologies.    EKG interpreted by me: Atrial fibrillation, rate 106, no acute ST elevations, this is an abnormal EKG    Labs are overall reassuring.  Patient has had good rate control after Lopressor.  She is asymptomatic.  Labs are nonactionable.  I do feel she is safe for discharge home at this time.  Recommended close follow-up as planned.  She is happy with this plan.    Final diagnoses:   Paroxysmal atrial fibrillation        Krish Serna MD  06/12/23 0340

## 2023-06-12 NOTE — PROGRESS NOTES
Transitional Care Follow Up Visit  Subjective     Claudia Stokes is a 73 y.o. female who presents for a transitional care management visit.    Within 48 business hours after discharge our office contacted her via telephone to coordinate her care and needs.      I reviewed and discussed the details of that call along with the discharge summary, hospital problems, inpatient lab results, inpatient diagnostic studies, and consultation reports with Claudia.     Current outpatient and discharge medications have been reconciled for the patient.  Reviewed by: Troy Umaña MD          6/7/2023     6:28 PM   Date of TCM Phone Call   [facility]   Date of Admission 6/5/2023   Date of Discharge 6/7/2023   Discharge Disposition Home or Self Care     Risk for Readmission (LACE) Score: 8 (6/7/2023  6:00 AM)      Hypertension  Associated symptoms include palpitations and shortness of breath.   Atrial Fibrillation  Symptoms include palpitations and shortness of breath. Past medical history includes atrial fibrillation.   DepressionPatient presents with the following symptoms: nervousness/anxiety, palpitations and shortness of breath.       Course During Hospital Stay:  Patient here for hospital follow-up patient recently discharged from hospital diagnosed newly onset atrial fibrillation and a COPD exacerbation.  Patient today still complains short of breath tachycardia oxygen saturation 88% on 2 L oxygen.  Blood pressure also elevated patient also complains anxiety depression.  Patient also complains the joint pain left side worse requesting pain medicine.  Lexapro is not helping in great deal.     The following portions of the patient's history were reviewed and updated as appropriate: allergies, current medications, past family history, past medical history, past social history, past surgical history, and problem list.    Review of Systems   Constitutional:  Positive for fatigue.   Respiratory:  Positive for  shortness of breath.    Cardiovascular:  Positive for palpitations.   Gastrointestinal: Negative.    Musculoskeletal:  Positive for arthralgias.   Skin: Negative.    Neurological: Negative.    Psychiatric/Behavioral:  Positive for agitation. The patient is nervous/anxious.      Objective   Physical Exam  Cardiovascular:      Rate and Rhythm: Tachycardia present. Rhythm irregular.      Heart sounds: Normal heart sounds.   Pulmonary:      Effort: Pulmonary effort is normal.      Breath sounds: Normal breath sounds.   Abdominal:      General: Bowel sounds are normal.   Musculoskeletal:      Cervical back: Neck supple.   Skin:     General: Skin is warm.   Neurological:      Mental Status: She is alert and oriented to person, place, and time.       Assessment & Plan   Diagnoses and all orders for this visit:    1. New onset atrial fibrillation (Primary) EKG today still atrial fibrillation with RVR rate 121.  Patient has atrial fibrillation with short of breath O2 sat 88% on 2 L oxygen.  Referred to the emergency room.  -     Ambulatory Referral to Cardiology    2. RLS (restless legs syndrome)  -     rOPINIRole (REQUIP) 3 MG tablet; Take 1 tablet by mouth Every Night. Take 1 hour before bedtime.  Dispense: 90 tablet; Refill: 3    3. Primary hypertension  -     amLODIPine (NORVASC) 2.5 MG tablet; Take 1 tablet by mouth Every Night.  Dispense: 90 tablet; Refill: 3    5. Anemia, hyponatremia since patient is going to the emergency room we will defer lab work to the hospital    6. COPD with acute exacerbation continue oxygen treatment.  Patient is going to ER  -     albuterol sulfate  (90 Base) MCG/ACT inhaler; Inhale 2 puffs by mouth every 4 to 6 hours as needed for cough  Dispense: 18 g; Refill: 6    7. Impaired mobility and ADLs    8. Persistent atrial fibrillation  -     Ambulatory Referral to Cardiology    9. Primary osteoarthritis of both knees may need cortisone shot.  Initiate Cymbalta joints pain mainly when  ambulating patient is in wheelchair a lot now.  -     XR Knee 3 View Bilateral    10. Depression with anxiety initiate Cymbalta wean off Lexapro.  Patient did not start Wellbutrin we will discontinue for now.  -     DULoxetine (Cymbalta) 60 MG capsule; Take 1 capsule by mouth 2 (Two) Times a Day.  Dispense: 60 capsule; Refill: 1    3 weeks follow-up      ECG 12 Lead Altered Mental Status    Date/Time: 6/12/2023 12:20 PM  Performed by: Troy Umaña MD  Authorized by: Krish Serna MD   Comparison: not compared with previous ECG   Rhythm: atrial fibrillation  Rate: tachycardic    Clinical impression: abnormal EKG

## 2023-06-12 NOTE — PROGRESS NOTES
Subjective   Claudia Stokes is a 73 y.o. female.     Chief Complaint   Patient presents with    Hypertension    Atrial Fibrillation    Depression       History of Present Illness       Current Outpatient Medications:     albuterol (PROVENTIL) (2.5 MG/3ML) 0.083% nebulizer solution, Take 2.5 mg by nebulization 4 (Four) Times a Day., Disp: , Rfl:     albuterol sulfate  (90 Base) MCG/ACT inhaler, Inhale 2 puffs by mouth every 4 to 6 hours as needed for cough, Disp: 18 g, Rfl: 6    amiodarone (PACERONE) 200 MG tablet, Take 1 tablet by mouth Daily for 30 days., Disp: 30 tablet, Rfl: 0    amLODIPine (NORVASC) 2.5 MG tablet, Take 1 tablet by mouth Every Night., Disp: , Rfl:     apixaban (ELIQUIS) 5 MG tablet tablet, Take 1 tablet by mouth Every 12 (Twelve) Hours for 30 days. Indications: Atrial Fibrillation, Disp: 60 tablet, Rfl: 0    ARIPiprazole (ABILIFY) 2 MG tablet, Take 1 tablet by mouth Daily., Disp: 30 tablet, Rfl: 5    cyclobenzaprine (FLEXERIL) 5 MG tablet, Take 1 tablet by mouth 3 (Three) Times a Day As Needed for Muscle Spasms., Disp: 30 tablet, Rfl: 0    escitalopram (LEXAPRO) 20 MG tablet, Take 1 tablet by mouth Daily., Disp: , Rfl:     Fluticasone-Umeclidin-Vilant (TRELEGY) 100-62.5-25 MCG/ACT inhaler, Inhale 1 puff Daily., Disp: 60 each, Rfl: 5    furosemide (LASIX) 20 MG tablet, TAKE ONE TABLET BY MOUTH DAILY (Patient taking differently: Take 1 tablet by mouth As Needed.), Disp: 90 tablet, Rfl: 3    lisinopril (PRINIVIL,ZESTRIL) 10 MG tablet, TAKE ONE TABLET BY MOUTH ONCE NIGHTLY, Disp: 90 tablet, Rfl: 3    Magnesium Oxide 400 (240 Mg) MG tablet, Take 1 tablet by mouth As Needed., Disp: , Rfl:     omeprazole (priLOSEC) 40 MG capsule, Take 1 capsule by mouth Every Night., Disp: 90 capsule, Rfl: 3    potassium chloride 10 MEQ CR tablet, Take 1 tablet by mouth 2 (Two) Times a Day., Disp: 30 tablet, Rfl: 0    pramipexole (MIRAPEX) 1 MG tablet, Take 1 tablet by mouth 2 (Two) Times a Day As Needed (pt states  she takes 1mg at bedtime and 1mg at 0500)., Disp: , Rfl:     rOPINIRole (REQUIP) 3 MG tablet, Take 1 tablet by mouth Every Night. Take 1 hour before bedtime., Disp: 30 tablet, Rfl: 1    albuterol (ACCUNEB) 1.25 MG/3ML nebulizer solution, Take 3 mL by nebulization Every 6 (Six) Hours As Needed. (Patient not taking: Reported on 6/12/2023), Disp: , Rfl:     albuterol (ACCUNEB) 1.25 MG/3ML nebulizer solution, Inhale contents of 1 vial (3mL) through a nebulizer Every 6 (Six) Hours As Needed. (Patient not taking: Reported on 6/12/2023), Disp: 300 mL, Rfl: 0    ibuprofen (ADVIL,MOTRIN) 400 MG tablet, Take 2 tablets by mouth 2 (Two) Times a Day As Needed for Mild Pain. (Patient not taking: Reported on 6/12/2023), Disp: , Rfl:     predniSONE (DELTASONE) 20 MG tablet, Take 2 tablets by mouth Daily for 5 days. (Patient not taking: Reported on 6/12/2023), Disp: 10 tablet, Rfl: 0    {Common H&P Review Areas:58482}    Review of Systems    Objective   Physical Exam    All tests have been reviewed.    Assessment & Plan   There are no diagnoses linked to this encounter.    Primary hypertension stable on medication below continue  -     Comprehensive Metabolic Panel  -     Lipid Panel  -     TSH  -     lisinopril (PRINIVIL,ZESTRIL) 10 MG tablet; Take 1 tablet by mouth Every Night.     Current moderate episode of major depressive disorder without prior episode and Wellbutrin  -     buPROPion XL (Wellbutrin XL) 150 MG 24 hr tablet; Take 1 tablet by mouth Daily.     Gastroesophageal reflux disease with esophagitis without hemorrhage stable     Impaired mobility and ADLs     Other emphysema check x-ray  -     XR Chest PA & Lateral     RLS (restless legs syndrome) discontinue Mirapex continue ropinirole magnesium for now  -     Magnesium  -     rOPINIRole (REQUIP) 2 MG tablet; Take 1 tablet by mouth Every Night. Take 1 hour before bedtime.     Arthritis pain shoulder knee hip discussed more next     Anemia, unspecified type check lab  -      CBC & Differential     Interstitial lung disease  -     CT Chest With & Without Contrast     History of tobacco use, presenting hazards to health     Bilateral leg edema  -     furosemide (Lasix) 20 MG tablet; Take 1 tablet by mouth Daily.  -     potassium chloride 10 MEQ CR tablet; Take 1 tablet by mouth 2

## 2023-06-13 ENCOUNTER — OFFICE VISIT (OUTPATIENT)
Dept: CARDIOLOGY | Facility: CLINIC | Age: 73
End: 2023-06-13
Payer: MEDICARE

## 2023-06-13 VITALS
DIASTOLIC BLOOD PRESSURE: 50 MMHG | HEART RATE: 81 BPM | BODY MASS INDEX: 39.87 KG/M2 | SYSTOLIC BLOOD PRESSURE: 118 MMHG | HEIGHT: 63 IN | OXYGEN SATURATION: 90 % | WEIGHT: 225 LBS

## 2023-06-13 DIAGNOSIS — J43.8 OTHER EMPHYSEMA: ICD-10-CM

## 2023-06-13 DIAGNOSIS — E66.01 MORBID OBESITY: ICD-10-CM

## 2023-06-13 DIAGNOSIS — I10 PRIMARY HYPERTENSION: ICD-10-CM

## 2023-06-13 DIAGNOSIS — J84.9 INTERSTITIAL LUNG DISEASE: ICD-10-CM

## 2023-06-13 DIAGNOSIS — I48.91 NEW ONSET ATRIAL FIBRILLATION: Primary | ICD-10-CM

## 2023-06-13 NOTE — PROGRESS NOTES
Subjective:     Encounter Date:06/13/2023      Patient ID: Claudia Stokes is a 73 y.o. female.    Chief Complaint: Atrial fibrillation  HPI  This is a 73-year-old female patient who was brought to the emergency room for acute mental status changes.  She was incidentally noted to be in atrial fibrillation.  She was unable to provide any history.  At today's visit she indicates she has never had atrial fibrillation before to her knowledge.  She has had no bleeding complications related to anticoagulation therapy with Eliquis.  There have been no signs or symptoms to suggest stroke, TIA or other cardioembolic events.  She has a history of advanced lung disease and is on continuous oxygen therapy.  She is a reformed smoker.  She is currently confined to a wheelchair due to advanced functional debility.  Echocardiogram performed while hospitalized showed normal LV systolic function with no valvular abnormalities of significance.  The following portions of the patient's history were reviewed and updated as appropriate: allergies, current medications, past family history, past medical history, past social history, past surgical history and problem  Review of Systems   Constitutional: Negative for chills, diaphoresis, fever, malaise/fatigue, weight gain and weight loss.   HENT:  Negative for ear discharge, hearing loss, hoarse voice and nosebleeds.    Eyes:  Negative for discharge, double vision, pain and photophobia.   Cardiovascular:  Negative for chest pain, claudication, cyanosis, dyspnea on exertion, irregular heartbeat, leg swelling, near-syncope, orthopnea, palpitations, paroxysmal nocturnal dyspnea and syncope.   Respiratory:  Negative for cough, hemoptysis, shortness of breath, sputum production and wheezing.    Endocrine: Negative for cold intolerance, heat intolerance, polydipsia, polyphagia and polyuria.   Hematologic/Lymphatic: Negative for adenopathy and bleeding problem. Does not bruise/bleed easily.    Skin:  Negative for color change, flushing, itching and rash.   Musculoskeletal:  Negative for muscle cramps, muscle weakness, myalgias and stiffness.   Gastrointestinal:  Negative for abdominal pain, diarrhea, hematemesis, hematochezia, nausea and vomiting.   Genitourinary:  Negative for dysuria, frequency and nocturia.   Neurological:  Negative for focal weakness, loss of balance, numbness, paresthesias and seizures.   Psychiatric/Behavioral:  Negative for altered mental status, hallucinations and suicidal ideas.    Allergic/Immunologic: Negative for HIV exposure, hives and persistent infections.         Current Outpatient Medications:   •  albuterol (PROVENTIL) (2.5 MG/3ML) 0.083% nebulizer solution, Take 2.5 mg by nebulization 4 (Four) Times a Day., Disp: , Rfl:   •  albuterol sulfate  (90 Base) MCG/ACT inhaler, Inhale 2 puffs by mouth every 4 to 6 hours as needed for cough, Disp: 18 g, Rfl: 6  •  amiodarone (PACERONE) 200 MG tablet, Take 1 tablet by mouth Daily for 30 days., Disp: 30 tablet, Rfl: 0  •  amLODIPine (NORVASC) 2.5 MG tablet, Take 1 tablet by mouth Every Night., Disp: 90 tablet, Rfl: 3  •  apixaban (ELIQUIS) 5 MG tablet tablet, Take 1 tablet by mouth Every 12 (Twelve) Hours for 30 days. Indications: Atrial Fibrillation, Disp: 60 tablet, Rfl: 0  •  ARIPiprazole (ABILIFY) 2 MG tablet, Take 1 tablet by mouth Daily., Disp: 30 tablet, Rfl: 5  •  cyclobenzaprine (FLEXERIL) 5 MG tablet, Take 1 tablet by mouth 3 (Three) Times a Day As Needed for Muscle Spasms., Disp: 30 tablet, Rfl: 0  •  DULoxetine (Cymbalta) 60 MG capsule, Take 1 capsule by mouth 2 (Two) Times a Day., Disp: 60 capsule, Rfl: 1  •  Fluticasone-Umeclidin-Vilant (TRELEGY) 100-62.5-25 MCG/ACT inhaler, Inhale 1 puff Daily., Disp: 60 each, Rfl: 5  •  furosemide (LASIX) 20 MG tablet, TAKE ONE TABLET BY MOUTH DAILY (Patient taking differently: Take 1 tablet by mouth As Needed.), Disp: 90 tablet, Rfl: 3  •  lisinopril (PRINIVIL,ZESTRIL) 10  "MG tablet, TAKE ONE TABLET BY MOUTH ONCE NIGHTLY, Disp: 90 tablet, Rfl: 3  •  Magnesium Oxide 400 (240 Mg) MG tablet, Take 1 tablet by mouth As Needed., Disp: , Rfl:   •  metoprolol succinate XL (TOPROL-XL) 50 MG 24 hr tablet, Take 1 tablet by mouth Daily., Disp: 30 tablet, Rfl: 0  •  omeprazole (priLOSEC) 40 MG capsule, Take 1 capsule by mouth Every Night., Disp: 90 capsule, Rfl: 3  •  potassium chloride 10 MEQ CR tablet, Take 1 tablet by mouth 2 (Two) Times a Day., Disp: 30 tablet, Rfl: 0  •  pramipexole (MIRAPEX) 1 MG tablet, Take 1 tablet by mouth 2 (Two) Times a Day As Needed (pt states she takes 1mg at bedtime and 1mg at 0500)., Disp: , Rfl:   •  rOPINIRole (REQUIP) 3 MG tablet, Take 1 tablet by mouth Every Night. Take 1 hour before bedtime., Disp: 90 tablet, Rfl: 3  No current facility-administered medications for this visit.    Objective:   Vitals and nursing note reviewed.   Constitutional:       Appearance: Healthy appearance. Not in distress.   Neck:      Vascular: No JVR. JVD normal.   Pulmonary:      Effort: Pulmonary effort is normal.      Breath sounds: Normal breath sounds. No wheezing. No rhonchi. No rales.   Chest:      Chest wall: Not tender to palpatation.   Cardiovascular:      PMI at left midclavicular line. Normal rate. Regular rhythm. Normal S1. Normal S2.       Murmurs: There is no murmur.      No gallop.  No click. No rub.   Pulses:     Intact distal pulses.   Edema:     Peripheral edema absent.   Abdominal:      General: Bowel sounds are normal.      Palpations: Abdomen is soft.      Tenderness: There is no abdominal tenderness.   Musculoskeletal: Normal range of motion.         General: No tenderness. Skin:     General: Skin is warm and dry.   Neurological:      General: No focal deficit present.      Mental Status: Alert and oriented to person, place and time.   Blood pressure 118/50, pulse 81, height 160 cm (63\"), weight 102 kg (225 lb), SpO2 90 %.   Lab Review:     Assessment:       1. " New onset atrial fibrillation  Rate control and anticoagulation strategy.    2. Primary hypertension  Acceptable blood pressure control.    3. Interstitial lung disease  The patient is on continuous oxygen therapy.    4. Other emphysema  Severe overall functional debility.  Wheelchair-bound.    5. Morbid obesity  Body mass index is just under 40.  The obesity pattern is central.  This is due to excess calorie intake.    Procedures    Plan:     Advance Care Planning   ACP discussion was held with the patient during this visit. Patient has an advance directive (not in EMR), copy requested.     We will continue anticoagulation therapy with Eliquis for 4 weeks.  If she does not pharmacologically cardiovert, we will consider elective outpatient synchronized external cardioversion.    No changes in medications made at today's visit.    No further cardiovascular testing indicated at this time.

## 2023-06-15 NOTE — TELEPHONE ENCOUNTER
Called and informed her of no further refills. The patient stated she does not see Psychiatry. She also told me you had told her to take half a tablet for 7 days before discontinuing this medication, should the patient stop taking the half of a tablet?

## 2023-06-19 ENCOUNTER — TELEPHONE (OUTPATIENT)
Dept: INTERNAL MEDICINE | Facility: CLINIC | Age: 73
End: 2023-06-19

## 2023-06-19 ENCOUNTER — READMISSION MANAGEMENT (OUTPATIENT)
Dept: CALL CENTER | Facility: HOSPITAL | Age: 73
End: 2023-06-19
Payer: MEDICARE

## 2023-06-19 NOTE — TELEPHONE ENCOUNTER
Caller: Claudia Stokes    Relationship: Self    Best call back number:   [phone]    Requested Prescriptions:   Requested Prescriptions      No prescriptions requested or ordered in this encounter      amLODIPine (NORVASC) 2.5 MG tablet     cyclobenzaprine (FLEXERIL) 5 MG tablet     Pharmacy where request should be sent:      [facility] [id number] [address] AT [facility] [phone] [facility] [phone] FX     Last office visit with prescribing clinician: 6/12/2023   Last telemedicine visit with prescribing clinician: Visit date not found   Next office visit with prescribing clinician: 7/11/2023     Additional details provided by patient:     SHOULD PATIENT START TAKING WELLBUTRIN AS WELL?  RIGHT NOW SHE IS JUST ON CYMBALTA AND SHE NEEDS SOMETHING EXTRA     Does the patient have less than a 3 day supply:  [x] Yes  [] No    Would you like a call back once the refill request has been completed: [] Yes [x] No    If the office needs to give you a call back, can they leave a voicemail: [] Yes [x] No    Amairani Smith, PCT   06/19/23 12:56 EDT

## 2023-06-19 NOTE — OUTREACH NOTE
Medical Week 2 Survey      Flowsheet Row Responses   [facility] facility patient discharged from? [facility]   Does the patient have one of the following disease processes/diagnoses(primary or secondary)? Other   Week 2 attempt successful? No   Unsuccessful attempts Attempt 1            Carina Pablo Registered Nurse

## 2023-07-11 PROBLEM — J96.01 ACUTE RESPIRATORY FAILURE WITH HYPOXIA AND HYPERCAPNIA: Status: RESOLVED | Noted: 2023-03-21 | Resolved: 2023-07-11

## 2023-07-11 PROBLEM — D64.9 ANEMIA: Status: RESOLVED | Noted: 2023-04-11 | Resolved: 2023-07-11

## 2023-07-11 PROBLEM — E87.1 HYPONATREMIA: Status: RESOLVED | Noted: 2023-06-06 | Resolved: 2023-07-11

## 2023-07-11 PROBLEM — J44.1 COPD WITH ACUTE EXACERBATION: Status: RESOLVED | Noted: 2023-03-21 | Resolved: 2023-07-11

## 2023-07-11 PROBLEM — J96.02 ACUTE RESPIRATORY FAILURE WITH HYPOXIA AND HYPERCAPNIA: Status: RESOLVED | Noted: 2023-03-21 | Resolved: 2023-07-11

## 2023-07-11 PROBLEM — I48.0 PAROXYSMAL ATRIAL FIBRILLATION: Status: ACTIVE | Noted: 2023-06-06

## 2023-07-20 ENCOUNTER — APPOINTMENT (OUTPATIENT)
Dept: GENERAL RADIOLOGY | Facility: HOSPITAL | Age: 73
DRG: 189 | End: 2023-07-20
Payer: MEDICARE

## 2023-07-20 ENCOUNTER — HOSPITAL ENCOUNTER (INPATIENT)
Facility: HOSPITAL | Age: 73
LOS: 2 days | Discharge: HOME | DRG: 189 | End: 2023-07-22
Attending: EMERGENCY MEDICINE | Admitting: FAMILY MEDICINE
Payer: MEDICARE

## 2023-07-20 DIAGNOSIS — J96.10 CHRONIC RESPIRATORY FAILURE: ICD-10-CM

## 2023-07-20 DIAGNOSIS — Z78.9 IMPAIRED MOBILITY AND ADLS: Primary | Chronic | ICD-10-CM

## 2023-07-20 DIAGNOSIS — I50.9 NEW ONSET OF CONGESTIVE HEART FAILURE: ICD-10-CM

## 2023-07-20 DIAGNOSIS — R60.0 BILATERAL LEG EDEMA: ICD-10-CM

## 2023-07-20 DIAGNOSIS — Z74.09 IMPAIRED MOBILITY AND ADLS: Primary | Chronic | ICD-10-CM

## 2023-07-20 DIAGNOSIS — J44.9 CHRONIC OBSTRUCTIVE PULMONARY DISEASE (COPD): ICD-10-CM

## 2023-07-20 PROBLEM — J96.22 ACUTE ON CHRONIC RESPIRATORY FAILURE WITH HYPOXIA AND HYPERCAPNIA: Status: ACTIVE | Noted: 2023-07-20

## 2023-07-20 PROBLEM — J96.21 ACUTE ON CHRONIC RESPIRATORY FAILURE WITH HYPOXIA AND HYPERCAPNIA: Status: ACTIVE | Noted: 2023-07-20

## 2023-07-20 LAB
A-A DO2: 43.2 MMHG
ALBUMIN SERPL-MCNC: 3.9 G/DL (ref 3.5–5.2)
ALBUMIN/GLOB SERPL: 1.3 G/DL
ALP SERPL-CCNC: 137 U/L (ref 39–117)
ALT SERPL W P-5'-P-CCNC: 22 U/L (ref 1–33)
ANION GAP SERPL CALCULATED.3IONS-SCNC: 6 MMOL/L (ref 5–15)
ANISOCYTOSIS BLD QL: NORMAL
ARTERIAL PATENCY WRIST A: POSITIVE
AST SERPL-CCNC: 22 U/L (ref 1–32)
ATMOSPHERIC PRESS: 733 MMHG
BASE EXCESS BLDA CALC-SCNC: 10.5 MMOL/L (ref 0–2)
BASOPHILS # BLD AUTO: 0.02 10*3/MM3 (ref 0–0.2)
BASOPHILS NFR BLD AUTO: 0.3 % (ref 0–1.5)
BDY SITE: ABNORMAL
BILIRUB SERPL-MCNC: 0.6 MG/DL (ref 0–1.2)
BUN SERPL-MCNC: 16 MG/DL (ref 8–23)
BUN/CREAT SERPL: 24.2 (ref 7–25)
CALCIUM SPEC-SCNC: 9.1 MG/DL (ref 8.6–10.5)
CHLORIDE SERPL-SCNC: 94 MMOL/L (ref 98–107)
CO2 SERPL-SCNC: 38 MMOL/L (ref 22–29)
COHGB MFR BLD: 1.9 % (ref 0–2)
CREAT SERPL-MCNC: 0.66 MG/DL (ref 0.57–1)
DEPRECATED RDW RBC AUTO: 50.3 FL (ref 37–54)
EGFRCR SERPLBLD CKD-EPI 2021: 93.3 ML/MIN/1.73
EOSINOPHIL # BLD AUTO: 0.11 10*3/MM3 (ref 0–0.4)
EOSINOPHIL NFR BLD AUTO: 1.6 % (ref 0.3–6.2)
ERYTHROCYTE [DISTWIDTH] IN BLOOD BY AUTOMATED COUNT: 15.6 % (ref 12.3–15.4)
FLUAV SUBTYP SPEC NAA+PROBE: NOT DETECTED
FLUBV RNA ISLT QL NAA+PROBE: NOT DETECTED
GAS FLOW AIRWAY: 3 LPM
GLOBULIN UR ELPH-MCNC: 2.9 GM/DL
GLUCOSE SERPL-MCNC: 146 MG/DL (ref 65–99)
HCO3 BLDA-SCNC: 39.3 MMOL/L (ref 22–28)
HCT VFR BLD AUTO: 36.3 % (ref 34–46.6)
HCT VFR BLD CALC: 34.1 %
HGB BLD-MCNC: 10.6 G/DL (ref 12–15.9)
HYPOCHROMIA BLD QL: NORMAL
IMM GRANULOCYTES # BLD AUTO: 0.03 10*3/MM3 (ref 0–0.05)
IMM GRANULOCYTES NFR BLD AUTO: 0.4 % (ref 0–0.5)
INHALED O2 CONCENTRATION: 32 %
LYMPHOCYTES # BLD AUTO: 0.72 10*3/MM3 (ref 0.7–3.1)
LYMPHOCYTES NFR BLD AUTO: 10.6 % (ref 19.6–45.3)
Lab: ABNORMAL
Lab: ABNORMAL
MCH RBC QN AUTO: 25.8 PG (ref 26.6–33)
MCHC RBC AUTO-ENTMCNC: 29.2 G/DL (ref 31.5–35.7)
MCV RBC AUTO: 88.3 FL (ref 79–97)
METHGB BLD QL: 0.5 % (ref 0–1.5)
MODALITY: ABNORMAL
MONOCYTES # BLD AUTO: 0.42 10*3/MM3 (ref 0.1–0.9)
MONOCYTES NFR BLD AUTO: 6.2 % (ref 5–12)
NEUTROPHILS NFR BLD AUTO: 5.49 10*3/MM3 (ref 1.7–7)
NEUTROPHILS NFR BLD AUTO: 80.9 % (ref 42.7–76)
NOTE: ABNORMAL
NOTIFIED BY: ABNORMAL
NOTIFIED WHO: ABNORMAL
NRBC BLD AUTO-RTO: 0 /100 WBC (ref 0–0.2)
NT-PROBNP SERPL-MCNC: 1788 PG/ML (ref 0–900)
OXYHGB MFR BLDV: 94.9 % (ref 94–99)
PCO2 BLDA: 77.9 MM HG (ref 35–45)
PCO2 TEMP ADJ BLD: ABNORMAL MM[HG]
PH BLDA: 7.31 PH UNITS (ref 7.35–7.45)
PH, TEMP CORRECTED: ABNORMAL
PLATELET # BLD AUTO: 256 10*3/MM3 (ref 140–450)
PMV BLD AUTO: 9.7 FL (ref 6–12)
PO2 BLDA: 89.8 MM HG (ref 75–100)
PO2 TEMP ADJ BLD: ABNORMAL MM[HG]
POTASSIUM SERPL-SCNC: 5.1 MMOL/L (ref 3.5–5.2)
PROT SERPL-MCNC: 6.8 G/DL (ref 6–8.5)
RBC # BLD AUTO: 4.11 10*6/MM3 (ref 3.77–5.28)
SAO2 % BLDCOA: 97.2 % (ref 94–100)
SARS-COV-2 RNA RESP QL NAA+PROBE: NOT DETECTED
SMALL PLATELETS BLD QL SMEAR: ADEQUATE
SODIUM SERPL-SCNC: 138 MMOL/L (ref 136–145)
VENTILATOR MODE: ABNORMAL
WBC MORPH BLD: NORMAL
WBC NRBC COR # BLD: 6.79 10*3/MM3 (ref 3.4–10.8)

## 2023-07-20 PROCEDURE — 94799 UNLISTED PULMONARY SVC/PX: CPT

## 2023-07-20 PROCEDURE — 99223 1ST HOSP IP/OBS HIGH 75: CPT | Performed by: FAMILY MEDICINE

## 2023-07-20 PROCEDURE — 99284 EMERGENCY DEPT VISIT MOD MDM: CPT

## 2023-07-20 PROCEDURE — 93005 ELECTROCARDIOGRAM TRACING: CPT | Performed by: PHYSICIAN ASSISTANT

## 2023-07-20 PROCEDURE — 25010000002 FUROSEMIDE PER 20 MG: Performed by: PHYSICIAN ASSISTANT

## 2023-07-20 PROCEDURE — 85025 COMPLETE CBC W/AUTO DIFF WBC: CPT | Performed by: PHYSICIAN ASSISTANT

## 2023-07-20 PROCEDURE — 36600 WITHDRAWAL OF ARTERIAL BLOOD: CPT

## 2023-07-20 PROCEDURE — 25010000002 METHYLPREDNISOLONE PER 125 MG: Performed by: PHYSICIAN ASSISTANT

## 2023-07-20 PROCEDURE — 82375 ASSAY CARBOXYHB QUANT: CPT

## 2023-07-20 PROCEDURE — 85007 BL SMEAR W/DIFF WBC COUNT: CPT | Performed by: PHYSICIAN ASSISTANT

## 2023-07-20 PROCEDURE — 94761 N-INVAS EAR/PLS OXIMETRY MLT: CPT

## 2023-07-20 PROCEDURE — 71045 X-RAY EXAM CHEST 1 VIEW: CPT

## 2023-07-20 PROCEDURE — 82805 BLOOD GASES W/O2 SATURATION: CPT

## 2023-07-20 PROCEDURE — 80053 COMPREHEN METABOLIC PANEL: CPT | Performed by: PHYSICIAN ASSISTANT

## 2023-07-20 PROCEDURE — 83880 ASSAY OF NATRIURETIC PEPTIDE: CPT | Performed by: PHYSICIAN ASSISTANT

## 2023-07-20 PROCEDURE — 83050 HGB METHEMOGLOBIN QUAN: CPT

## 2023-07-20 PROCEDURE — 63710000001 PREDNISONE PER 1 MG: Performed by: FAMILY MEDICINE

## 2023-07-20 PROCEDURE — 94640 AIRWAY INHALATION TREATMENT: CPT

## 2023-07-20 PROCEDURE — 87636 SARSCOV2 & INF A&B AMP PRB: CPT | Performed by: PHYSICIAN ASSISTANT

## 2023-07-20 PROCEDURE — 94660 CPAP INITIATION&MGMT: CPT

## 2023-07-20 RX ORDER — ACETAMINOPHEN 160 MG/5ML
650 SOLUTION ORAL EVERY 4 HOURS PRN
Status: DISCONTINUED | OUTPATIENT
Start: 2023-07-20 | End: 2023-07-22 | Stop reason: HOSPADM

## 2023-07-20 RX ORDER — ROPINIROLE 1 MG/1
3 TABLET, FILM COATED ORAL NIGHTLY
Status: DISCONTINUED | OUTPATIENT
Start: 2023-07-20 | End: 2023-07-22 | Stop reason: HOSPADM

## 2023-07-20 RX ORDER — PANTOPRAZOLE SODIUM 40 MG/1
40 TABLET, DELAYED RELEASE ORAL NIGHTLY
Status: DISCONTINUED | OUTPATIENT
Start: 2023-07-20 | End: 2023-07-22 | Stop reason: HOSPADM

## 2023-07-20 RX ORDER — ONDANSETRON 2 MG/ML
4 INJECTION INTRAMUSCULAR; INTRAVENOUS EVERY 6 HOURS PRN
Status: DISCONTINUED | OUTPATIENT
Start: 2023-07-20 | End: 2023-07-22 | Stop reason: HOSPADM

## 2023-07-20 RX ORDER — SODIUM CHLORIDE 9 MG/ML
40 INJECTION, SOLUTION INTRAVENOUS AS NEEDED
Status: DISCONTINUED | OUTPATIENT
Start: 2023-07-20 | End: 2023-07-22 | Stop reason: HOSPADM

## 2023-07-20 RX ORDER — TEMAZEPAM 15 MG/1
15 CAPSULE ORAL NIGHTLY PRN
Status: DISCONTINUED | OUTPATIENT
Start: 2023-07-20 | End: 2023-07-22 | Stop reason: HOSPADM

## 2023-07-20 RX ORDER — METHYLPREDNISOLONE SODIUM SUCCINATE 125 MG/2ML
125 INJECTION, POWDER, LYOPHILIZED, FOR SOLUTION INTRAMUSCULAR; INTRAVENOUS ONCE
Status: COMPLETED | OUTPATIENT
Start: 2023-07-20 | End: 2023-07-20

## 2023-07-20 RX ORDER — FUROSEMIDE 10 MG/ML
80 INJECTION INTRAMUSCULAR; INTRAVENOUS ONCE
Status: COMPLETED | OUTPATIENT
Start: 2023-07-20 | End: 2023-07-20

## 2023-07-20 RX ORDER — CALCIUM CARBONATE 500 MG/1
1 TABLET, CHEWABLE ORAL 2 TIMES DAILY PRN
Status: DISCONTINUED | OUTPATIENT
Start: 2023-07-20 | End: 2023-07-22 | Stop reason: HOSPADM

## 2023-07-20 RX ORDER — ACETAMINOPHEN 325 MG/1
650 TABLET ORAL EVERY 4 HOURS PRN
Status: DISCONTINUED | OUTPATIENT
Start: 2023-07-20 | End: 2023-07-22 | Stop reason: HOSPADM

## 2023-07-20 RX ORDER — ACETAMINOPHEN 650 MG/1
650 SUPPOSITORY RECTAL EVERY 4 HOURS PRN
Status: DISCONTINUED | OUTPATIENT
Start: 2023-07-20 | End: 2023-07-22 | Stop reason: HOSPADM

## 2023-07-20 RX ORDER — IPRATROPIUM BROMIDE AND ALBUTEROL SULFATE 2.5; .5 MG/3ML; MG/3ML
3 SOLUTION RESPIRATORY (INHALATION) ONCE
Status: COMPLETED | OUTPATIENT
Start: 2023-07-20 | End: 2023-07-20

## 2023-07-20 RX ORDER — SODIUM CHLORIDE 0.9 % (FLUSH) 0.9 %
10 SYRINGE (ML) INJECTION EVERY 12 HOURS SCHEDULED
Status: DISCONTINUED | OUTPATIENT
Start: 2023-07-20 | End: 2023-07-22 | Stop reason: HOSPADM

## 2023-07-20 RX ORDER — AMOXICILLIN 250 MG
2 CAPSULE ORAL 2 TIMES DAILY
Status: DISCONTINUED | OUTPATIENT
Start: 2023-07-20 | End: 2023-07-22 | Stop reason: HOSPADM

## 2023-07-20 RX ORDER — IPRATROPIUM BROMIDE AND ALBUTEROL SULFATE 2.5; .5 MG/3ML; MG/3ML
3 SOLUTION RESPIRATORY (INHALATION)
Status: DISCONTINUED | OUTPATIENT
Start: 2023-07-20 | End: 2023-07-22 | Stop reason: HOSPADM

## 2023-07-20 RX ORDER — BISACODYL 10 MG
10 SUPPOSITORY, RECTAL RECTAL DAILY PRN
Status: DISCONTINUED | OUTPATIENT
Start: 2023-07-20 | End: 2023-07-22 | Stop reason: HOSPADM

## 2023-07-20 RX ORDER — DULOXETIN HYDROCHLORIDE 30 MG/1
60 CAPSULE, DELAYED RELEASE ORAL 2 TIMES DAILY
Status: DISCONTINUED | OUTPATIENT
Start: 2023-07-20 | End: 2023-07-22 | Stop reason: HOSPADM

## 2023-07-20 RX ORDER — POLYETHYLENE GLYCOL 3350 17 G/17G
17 POWDER, FOR SOLUTION ORAL DAILY PRN
Status: DISCONTINUED | OUTPATIENT
Start: 2023-07-20 | End: 2023-07-22 | Stop reason: HOSPADM

## 2023-07-20 RX ORDER — SODIUM CHLORIDE 0.9 % (FLUSH) 0.9 %
10 SYRINGE (ML) INJECTION AS NEEDED
Status: DISCONTINUED | OUTPATIENT
Start: 2023-07-20 | End: 2023-07-22 | Stop reason: HOSPADM

## 2023-07-20 RX ORDER — PRAMIPEXOLE DIHYDROCHLORIDE 1 MG/1
1 TABLET ORAL 2 TIMES DAILY PRN
Status: DISCONTINUED | OUTPATIENT
Start: 2023-07-20 | End: 2023-07-22 | Stop reason: HOSPADM

## 2023-07-20 RX ORDER — BENZONATATE 100 MG/1
200 CAPSULE ORAL 3 TIMES DAILY PRN
Status: DISCONTINUED | OUTPATIENT
Start: 2023-07-20 | End: 2023-07-22 | Stop reason: HOSPADM

## 2023-07-20 RX ORDER — ONDANSETRON 4 MG/1
4 TABLET, FILM COATED ORAL EVERY 6 HOURS PRN
Status: DISCONTINUED | OUTPATIENT
Start: 2023-07-20 | End: 2023-07-22 | Stop reason: HOSPADM

## 2023-07-20 RX ORDER — BUDESONIDE 0.5 MG/2ML
0.5 INHALANT ORAL
Status: DISCONTINUED | OUTPATIENT
Start: 2023-07-20 | End: 2023-07-22 | Stop reason: HOSPADM

## 2023-07-20 RX ORDER — BISACODYL 5 MG/1
5 TABLET, DELAYED RELEASE ORAL DAILY PRN
Status: DISCONTINUED | OUTPATIENT
Start: 2023-07-20 | End: 2023-07-22 | Stop reason: HOSPADM

## 2023-07-20 RX ORDER — NITROGLYCERIN 0.4 MG/1
0.4 TABLET SUBLINGUAL
Status: DISCONTINUED | OUTPATIENT
Start: 2023-07-20 | End: 2023-07-22 | Stop reason: HOSPADM

## 2023-07-20 RX ORDER — SOTALOL HYDROCHLORIDE 80 MG/1
80 TABLET ORAL EVERY 12 HOURS SCHEDULED
Status: DISCONTINUED | OUTPATIENT
Start: 2023-07-20 | End: 2023-07-21

## 2023-07-20 RX ORDER — LISINOPRIL 10 MG/1
10 TABLET ORAL NIGHTLY
Status: DISCONTINUED | OUTPATIENT
Start: 2023-07-20 | End: 2023-07-22 | Stop reason: HOSPADM

## 2023-07-20 RX ORDER — CYCLOBENZAPRINE HCL 10 MG
5 TABLET ORAL 3 TIMES DAILY PRN
Status: DISCONTINUED | OUTPATIENT
Start: 2023-07-20 | End: 2023-07-22 | Stop reason: HOSPADM

## 2023-07-20 RX ORDER — PREDNISONE 20 MG/1
40 TABLET ORAL
Status: DISCONTINUED | OUTPATIENT
Start: 2023-07-20 | End: 2023-07-22 | Stop reason: HOSPADM

## 2023-07-20 RX ADMIN — PREDNISONE 40 MG: 20 TABLET ORAL at 18:39

## 2023-07-20 RX ADMIN — PREDNISONE 40 MG: 20 TABLET ORAL at 18:38

## 2023-07-20 RX ADMIN — IPRATROPIUM BROMIDE AND ALBUTEROL SULFATE 3 ML: .5; 3 SOLUTION RESPIRATORY (INHALATION) at 14:53

## 2023-07-20 RX ADMIN — LISINOPRIL 10 MG: 10 TABLET ORAL at 20:40

## 2023-07-20 RX ADMIN — IPRATROPIUM BROMIDE AND ALBUTEROL SULFATE 3 ML: .5; 3 SOLUTION RESPIRATORY (INHALATION) at 19:26

## 2023-07-20 RX ADMIN — BUDESONIDE 0.5 MG: 0.5 INHALANT RESPIRATORY (INHALATION) at 19:26

## 2023-07-20 RX ADMIN — PANTOPRAZOLE SODIUM 40 MG: 40 TABLET, DELAYED RELEASE ORAL at 20:40

## 2023-07-20 RX ADMIN — SOTALOL HYDROCHLORIDE 80 MG: 80 TABLET ORAL at 20:39

## 2023-07-20 RX ADMIN — APIXABAN 5 MG: 5 TABLET, FILM COATED ORAL at 20:40

## 2023-07-20 RX ADMIN — PRAMIPEXOLE DIHYDROCHLORIDE 1 MG: 1 TABLET ORAL at 20:43

## 2023-07-20 RX ADMIN — FUROSEMIDE 80 MG: 10 INJECTION, SOLUTION INTRAMUSCULAR; INTRAVENOUS at 16:25

## 2023-07-20 RX ADMIN — DULOXETINE HYDROCHLORIDE 60 MG: 30 CAPSULE, DELAYED RELEASE ORAL at 20:44

## 2023-07-20 RX ADMIN — ROPINIROLE HYDROCHLORIDE 3 MG: 1 TABLET, FILM COATED ORAL at 20:46

## 2023-07-20 RX ADMIN — CYCLOBENZAPRINE 5 MG: 10 TABLET, FILM COATED ORAL at 20:44

## 2023-07-20 RX ADMIN — TEMAZEPAM 15 MG: 15 CAPSULE ORAL at 20:43

## 2023-07-20 RX ADMIN — METHYLPREDNISOLONE SODIUM SUCCINATE 125 MG: 125 INJECTION, POWDER, FOR SOLUTION INTRAMUSCULAR; INTRAVENOUS at 15:12

## 2023-07-20 RX ADMIN — Medication 10 ML: at 20:44

## 2023-07-20 NOTE — PLAN OF CARE
Goal Outcome Evaluation:  Plan of Care Reviewed With: patient        Progress: no change  Outcome Evaluation: PT arrived this shift from ED, agreable to attempt Bipap nocturnal tonight

## 2023-07-20 NOTE — H&P
[facility]      Name:  Claudia Stokes   Age:  73 y.o.  Sex:  female  :  1950  MRN:  [id number]   Visit Number:  [id number]  Admission Date:  2023  Date Of Service:  23  Primary Care Physician:  Troy Umaña MD    Chief Complaint:     Shortness of breath, swelling    History Of Presenting Illness:      The patient is a 73-year-old with a history of chronic respiratory failure with hypoxia and hypercapnia, COPD, suspected diastolic congestive heart failure, atrial fibrillation on sotalol and Eliquis, prior tobacco abuse, obesity, who presented from home due to 3 to 4-day complaint of increasing shortness of breath.  Patient notes no significant cough but feels congested.  No chest pains or palpitations.  Has noticed increased swelling to her lower extremities.  She denies any fevers or chills.  She has been using her home Trelegy inhaler and nebulizer treatments.  She was actually seen recently and ordered a BiPAP which has yet to be set up.  She has not smoked in many years.  She follows with pulmonology in [place] and Dr. Best as her cardiologist.  She is chronically on sotalol and Eliquis.  She remains out of rhythm and is due to potentially have a cardioversion done.    In the ER, the patient was hypoxic on 3 L and was requiring up to 5 L to maintain sats greater than 90%.  She was initially ordered BiPAP.  EKG did show atrial fibrillation but otherwise no acute ST elevations.  She did have increased BNP.  Her kidney function appears stable and at baseline.  ABG was demonstrating a pH of 7.311 and a PCO2 of 77.9.  Due to concern for possible COPD exacerbation and questionable new onset heart failure we were asked admit the patient.    Review Of Systems:    All systems were reviewed and negative except as mentioned in history of presenting illness, assessment and plan.    Past Medical History: Patient  has a past medical history of A-fib,  COPD (chronic obstructive pulmonary disease), Hypertension, and Impaired functional mobility, balance, gait, and endurance.    Past Surgical History: Patient  has a past surgical history that includes Vena cava filter placement and Breast biopsy.    Social History: Patient  reports that she quit smoking about 5 years ago. Her smoking use included cigarettes. She has a 25.00 pack-year smoking history. She has never been exposed to tobacco smoke. She has never used smokeless tobacco. She reports current alcohol use. She reports that she does not use drugs.    Family History:  Patient's family history has been reviewed and found to be noncontributory.     Allergies:      Codeine, Sulfa antibiotics, and Tetracyclines & related    Home Medications:    Prior to Admission Medications       Prescriptions Last Dose Informant Patient Reported? Taking?    albuterol (PROVENTIL) (2.5 MG/3ML) 0.083% nebulizer solution 7/20/2023  Yes Yes    Take 2.5 mg by nebulization 4 (Four) Times a Day.    albuterol sulfate  (90 Base) MCG/ACT inhaler 7/20/2023  No Yes    Inhale 2 puffs by mouth every 4 to 6 hours as needed for cough    amLODIPine (NORVASC) 2.5 MG tablet 7/19/2023  No Yes    Take 1 tablet by mouth Every Night.    apixaban (ELIQUIS) 5 MG tablet tablet 7/20/2023  No Yes    Take 1 tablet by mouth Every 12 (Twelve) Hours for 30 days. Indications: Atrial Fibrillation    cyclobenzaprine (FLEXERIL) 5 MG tablet 7/19/2023  No Yes    Take 1 tablet by mouth 3 (Three) Times a Day As Needed for Muscle Spasms.    DULoxetine (Cymbalta) 60 MG capsule 7/20/2023  No Yes    Take 1 capsule by mouth 2 (Two) Times a Day.    Fluticasone-Umeclidin-Vilant (TRELEGY) 100-62.5-25 MCG/ACT inhaler 7/20/2023  No Yes    Inhale 1 puff Daily. Rinse mouth out after use    lisinopril (PRINIVIL,ZESTRIL) 10 MG tablet 7/19/2023  No Yes    TAKE ONE TABLET BY MOUTH ONCE NIGHTLY    Magnesium Oxide 400 (240 Mg) MG tablet 7/19/2023 Self Yes Yes    Take 1 tablet by  "mouth As Needed.    omeprazole (priLOSEC) 40 MG capsule 7/20/2023  No Yes    Take 1 capsule by mouth Every Night.    rOPINIRole (REQUIP) 3 MG tablet 7/19/2023  No Yes    Take 1 tablet by mouth Every Night. Take 1 hour before bedtime.    Sotalol HCl AF 80 MG tablet 7/20/2023  No Yes    Take 1 tablet by mouth 2 (Two) Times a Day.    furosemide (LASIX) 20 MG tablet  Self No No    TAKE ONE TABLET BY MOUTH DAILY    Patient taking differently:  Take 1 tablet by mouth As Needed.    potassium chloride 10 MEQ CR tablet More than a month  No No    Take 1 tablet by mouth 2 (Two) Times a Day.    pramipexole (MIRAPEX) 1 MG tablet   Yes No    Take 1 tablet by mouth 2 (Two) Times a Day As Needed (pt states she takes 1mg at bedtime and 1mg at 0500).          ED Medications:    Medications   sodium chloride 0.9 % flush 10 mL (has no administration in time range)   ipratropium-albuterol (DUO-NEB) nebulizer solution 3 mL (3 mL Nebulization Given 7/20/23 1453)   methylPREDNISolone sodium succinate (SOLU-Medrol) injection 125 mg (125 mg Intravenous Given 7/20/23 1512)   furosemide (LASIX) injection 80 mg (80 mg Intravenous Given 7/20/23 1625)     Vital Signs:  Temp:  [97.8 °F (36.6 °C)] 97.8 °F (36.6 °C)  Heart Rate:  [103-112] 112  Resp:  [20-26] 20  BP: (105-148)/() 148/100        07/20/23  1424 07/20/23  1700   Weight: 104 kg (230 lb) 116 kg (256 lb 2.8 oz)     Body mass index is 45.39 kg/m².    Physical Exam:     Most recent vital Signs: /100 (BP Location: Left arm, Patient Position: Lying)   Pulse 112   Temp 97.8 °F (36.6 °C) (Oral)   Resp 20   Ht 160 cm (62.99\")   Wt 116 kg (256 lb 2.8 oz)   SpO2 92%   BMI 45.39 kg/m²     Physical Exam  Constitutional:       General: She is not in acute distress.     Appearance: She is obese. She is not toxic-appearing.   HENT:      Head: Normocephalic and atraumatic.      Mouth/Throat:      Mouth: Mucous membranes are moist.      Pharynx: Oropharynx is clear.   Eyes:      " Extraocular Movements: Extraocular movements intact.      Pupils: Pupils are equal, round, and reactive to light.   Cardiovascular:      Rate and Rhythm: Tachycardia present. Rhythm irregular.      Pulses: Normal pulses.      Heart sounds: No murmur heard.    No gallop.   Pulmonary:      Breath sounds: Wheezing and rales present.   Abdominal:      General: Bowel sounds are normal. There is no distension.      Tenderness: There is no abdominal tenderness. There is no guarding.   Musculoskeletal:      Right lower leg: Edema present.      Left lower leg: Edema present.   Skin:     General: Skin is warm.      Capillary Refill: Capillary refill takes less than 2 seconds.      Findings: No bruising or lesion.   Neurological:      General: No focal deficit present.      Mental Status: She is alert. Mental status is at baseline.      Motor: Weakness present.      Gait: Gait normal.   Psychiatric:         Mood and Affect: Mood normal.         Thought Content: Thought content normal.       Laboratory data:    I have reviewed the labs done in the emergency room.    Results from last 7 days   Lab Units 07/20/23  1511   SODIUM mmol/L 138   POTASSIUM mmol/L 5.1   CHLORIDE mmol/L 94*   CO2 mmol/L 38.0*   BUN mg/dL 16   CREATININE mg/dL 0.66   CALCIUM mg/dL 9.1   BILIRUBIN mg/dL 0.6   ALK PHOS U/L 137*   ALT (SGPT) U/L 22   AST (SGOT) U/L 22   GLUCOSE mg/dL 146*     Results from last 7 days   Lab Units 07/20/23  1511   WBC 10*3/mm3 6.79   HEMOGLOBIN g/dL 10.6*   HEMATOCRIT % 36.3   PLATELETS 10*3/mm3 256             Results from last 7 days   Lab Units 07/20/23  1511   PROBNP pg/mL 1,788.0*             Results from last 7 days   Lab Units 07/20/23  1500   PH, ARTERIAL pH units 7.311*   PO2 ART mm Hg 89.8   PCO2, ARTERIAL mm Hg 77.9*   HCO3 ART mmol/L 39.3*           Invalid input(s): USDES,  BLOODU, NITRITITE, BACT, EP    Pain Management Panel           No data to display                EKG:      Appears to be atrial fibrillation,  see no acute ST elevations or T wave abnormalities when compared to prior EKG    Radiology:    XR Chest 1 View    Result Date: 7/20/2023  PROCEDURE: XR CHEST 1 VW-    HISTORY: soa  COMPARISON: June 12, 2023.  FINDINGS: The heart is enlarged, but stable. Pulmonary vascular congestion is stable. There is mild interstitial disease bilaterally which is stable.  There is no pneumothorax. There are no acute osseous abnormalities.      No significant interval change.        Images were reviewed, interpreted, and dictated by Dr. Helena Salcido MD Transcribed by Charo Marshall PA-C.  This report was signed and finalized on 7/20/2023 3:45 PM by Helena Salcido MD.     Assessment:    COPD with acute exacerbation, POA  Acute on chronic hypoxic/hypercapnic respiratory failure, POA  Possible new onset diastolic congestive heart failure versus tachycardia induced cardiomyopathy, POA  Chronic venous insufficiency  Hypertension    Plan:    Admit as inpatient    Respiratory failure/COPD/CHF:  Symptomatology is concerning for ongoing COPD exacerbation and will treat with bronchodilators and steroids.  Will place consult for pulmonology to see inpatient due to her degree of hypercapnic respiratory failure.  I suspect she needs NIPPV/trilogy at home, will likely need to arrange this for discharge if possible.    In regards to concern for heart failure, she does have some degree of peripheral edema and BNP is elevated.  EF from March was stable with no significant cor pulmonale noted.  She has known atrial fibrillation and is currently in A-fib.  Perhaps having some tachycardia induced cardiomyopathy.  Was recently started on sotalol about 10 days ago.  Notes was due to see Dr. Best to consider cardioversion after 4 weeks of sotalol therapy.  We will ask Dr. Best to see in consultation tomorrow.    A-fib:  Remains in atrial fibrillation with rate controlled appears.  She is on sotalol currently.  Due to have potential cardioversion  performed with Dr. Best after 1 month of sotalol therapy.  We will ask him to see during hospital stay.    The patient otherwise meets inpatient level care and anticipate greater than 2 midnight stay.  Further recommendation depend upon clinical course.  Plan of care discussed with the patient at bedside.    Risk Assessment: High  DVT Prophylaxis: Eliquis  Code Status: Full  Diet: Cardiac/fluid restriction    Advance Care Planning   ACP discussion was held with the patient during this visit. Patient does not have an advance directive, declines further assistance.           Lorraine Womack DO  07/20/23  17:56 EDT    Dictated utilizing Dragon dictation.

## 2023-07-20 NOTE — ED PROVIDER NOTES
"Subjective  History of Present Illness:    Chief Complaint:   Chief Complaint   Patient presents with    Shortness of Breath      History of Present Illness: Claudia Stokes is a 73 y.o. female who presents to the emergency department complaining of chest congestion and shortness of breath.  Patient states for about 5 days has had the symptoms.  Wears 3 L of oxygen continuously at home for COPD.  Is been using nebs and inhalers which helps \"loosen some of the mucus up\" however came in here hypoxic in the 70s on 3 and half liters nasal cannula.  No chest pain.  History of A-fib (on eliquis), COPD and hypertension.  Awake alert oriented conversant no acute distress nontoxic.  Onset: 5 days ago  Duration: Ongoing  Exacerbating / Alleviating factors: Briefly helped with inhalers and nebulizer treatments  Associated symptoms: None      Nurses Notes reviewed and agree, including vitals, allergies, social history and prior medical history.     Review of Systems   Constitutional: Negative.    HENT: Negative.     Eyes: Negative.    Respiratory:  Positive for cough and shortness of breath.    Cardiovascular: Negative.    Gastrointestinal: Negative.    Genitourinary: Negative.    Musculoskeletal: Negative.    Skin: Negative.    Neurological: Negative.    Psychiatric/Behavioral: Negative.       Past Medical History:   Diagnosis Date    A-fib     COPD (chronic obstructive pulmonary disease)     Hypertension     Impaired functional mobility, balance, gait, and endurance        Allergies:    Codeine, Sulfa antibiotics, and Tetracyclines & related      Past Surgical History:   Procedure Laterality Date    BREAST BIOPSY      VENA CAVA FILTER PLACEMENT           Social History     Socioeconomic History    Marital status: Single   Tobacco Use    Smoking status: Former     Packs/day: 1.00     Years: 25.00     Pack years: 25.00     Types: Cigarettes     Quit date:      Years since quittin.5     Passive exposure: Never    Smokeless " "tobacco: Never   Vaping Use    Vaping Use: Never used   Substance and Sexual Activity    Alcohol use: Yes     Comment: occassional    Drug use: Never    Sexual activity: Defer         History reviewed. No pertinent family history.    Objective  Physical Exam:  /47 (BP Location: Left arm, Patient Position: Sitting)   Pulse 103   Temp 97.8 °F (36.6 °C) (Oral)   Resp 22   Ht 160 cm (63\")   Wt 104 kg (230 lb)   SpO2 (!) 87%   BMI 40.74 kg/m²      Physical Exam  Vitals and nursing note reviewed.   Constitutional:       General: She is not in acute distress.     Appearance: She is well-developed. She is obese. She is not ill-appearing, toxic-appearing or diaphoretic.   HENT:      Head: Normocephalic and atraumatic.   Eyes:      Extraocular Movements: Extraocular movements intact.   Cardiovascular:      Rate and Rhythm: Normal rate and regular rhythm. Tachycardia present.   Pulmonary:      Effort: Pulmonary effort is normal.      Breath sounds: Decreased breath sounds and wheezing present.   Musculoskeletal:         General: Normal range of motion.      Cervical back: Normal range of motion.      Right lower leg: Edema present.      Left lower leg: Edema present.   Skin:     General: Skin is warm and dry.   Neurological:      General: No focal deficit present.      Mental Status: She is alert.   Psychiatric:         Mood and Affect: Mood normal.         Behavior: Behavior normal.         Procedures    ED Course:    ED Course as of 07/20/23 1609   Thu Jul 20, 2023   1515 COVID-19 and FLU A/B PCR - Swab, Nasopharynx [TM]   1601 EKG interpreted by me.  Atrial fibrillation.  Rate of 99.  No obvious ST or T wave changes.  Abnormal EKG [CG]      ED Course User Index  [CG] Andrés Vega B, DO  [TM] Abdulaziz Rosado PA-C       Lab Results (last 24 hours)       Procedure Component Value Units Date/Time    COVID-19 and FLU A/B PCR - Swab, Nasopharynx [[id number]]  (Normal) Collected: 07/20/23 1449    Specimen: " Swab from Nasopharynx Updated: 07/20/23 1513     COVID19 Not Detected     Influenza A PCR Not Detected     Influenza B PCR Not Detected    Narrative:      Fact sheet for providers: https://www.fda.gov/media/762262/download    Fact sheet for patients: https://www.fda.gov/media/549286/download    Test performed by PCR.    Blood Gas, Arterial With Co-Ox [[id number]]  (Abnormal) Collected: 07/20/23 1500    Specimen: Arterial Blood Updated: 07/20/23 1500     Site Left Radial     Reddy's Test Positive     pH, Arterial 7.311 pH units      Comment: 84 Value below reference range        pCO2, Arterial 77.9 mm Hg      Comment: 86 Value above critical limit        pO2, Arterial 89.8 mm Hg      HCO3, Arterial 39.3 mmol/L      Comment: 83 Value above reference range        Base Excess, Arterial 10.5 mmol/L      Comment: 83 Value above reference range        O2 Saturation, Arterial 97.2 %      Hematocrit, Blood Gas 34.1 %      Comment: 84 Value below reference range        Oxyhemoglobin 94.9 %      Methemoglobin 0.50 %      Carboxyhemoglobin 1.9 %      A-a DO2 43.2 mmHg      Barometric Pressure for Blood Gas 733 mmHg      Modality Simple Mask     FIO2 32 %      Flow Rate 3.0 lpm      Ventilator Mode NA     Note --     Notified Who PA     Notified By [id number]     Notified Time 07/20/2023 15:04     Collected by CB     Comment: Meter: [id number]     :  [id number]        pH, Temp Corrected --     pCO2, Temperature Corrected --     pO2, Temperature Corrected --    CBC & Differential [[id number]]  (Abnormal) Collected: 07/20/23 1511    Specimen: Blood Updated: 07/20/23 1537    Narrative:      The following orders were created for panel order CBC & Differential.  Procedure                               Abnormality         Status                     ---------                               -----------         ------                     CBC Auto Differential[[id number]]        Abnormal            Final result               Scan  Slide[[id number]]                                       Final result                 Please view results for these tests on the individual orders.    Comprehensive Metabolic Panel [[id number]]  (Abnormal) Collected: 07/20/23 1511    Specimen: Blood Updated: 07/20/23 1534     Glucose 146 mg/dL      BUN 16 mg/dL      Creatinine 0.66 mg/dL      Sodium 138 mmol/L      Potassium 5.1 mmol/L      Chloride 94 mmol/L      CO2 38.0 mmol/L      Calcium 9.1 mg/dL      Total Protein 6.8 g/dL      Albumin 3.9 g/dL      ALT (SGPT) 22 U/L      AST (SGOT) 22 U/L      Alkaline Phosphatase 137 U/L      Total Bilirubin 0.6 mg/dL      Globulin 2.9 gm/dL      A/G Ratio 1.3 g/dL      BUN/Creatinine Ratio 24.2     Anion Gap 6.0 mmol/L      eGFR 93.3 mL/min/1.73     Narrative:      GFR Normal >60  Chronic Kidney Disease <60  Kidney Failure <15    The GFR formula is only valid for adults with stable renal function between ages 18 and 70.    BNP [[id number]]  (Abnormal) Collected: 07/20/23 1511    Specimen: Blood Updated: 07/20/23 1600     proBNP 1,788.0 pg/mL     Narrative:      Among patients with dyspnea, NT-proBNP is highly sensitive for the detection of acute congestive heart failure. In addition NT-proBNP of <300 pg/ml effectively rules out acute congestive heart failure with 99% negative predictive value.    Results may be falsely decreased if patient taking Biotin.      CBC Auto Differential [[id number]]  (Abnormal) Collected: 07/20/23 1511    Specimen: Blood Updated: 07/20/23 1518     WBC 6.79 10*3/mm3      RBC 4.11 10*6/mm3      Hemoglobin 10.6 g/dL      Hematocrit 36.3 %      MCV 88.3 fL      MCH 25.8 pg      MCHC 29.2 g/dL      RDW 15.6 %      RDW-SD 50.3 fl      MPV 9.7 fL      Platelets 256 10*3/mm3      Neutrophil % 80.9 %      Lymphocyte % 10.6 %      Monocyte % 6.2 %      Eosinophil % 1.6 %      Basophil % 0.3 %      Immature Grans % 0.4 %      Neutrophils, Absolute 5.49 10*3/mm3      Lymphocytes, Absolute 0.72 10*3/mm3       Monocytes, Absolute 0.42 10*3/mm3      Eosinophils, Absolute 0.11 10*3/mm3      Basophils, Absolute 0.02 10*3/mm3      Immature Grans, Absolute 0.03 10*3/mm3      nRBC 0.0 /100 WBC     Scan Slide [[id number]] Collected: 07/20/23 1511    Specimen: Blood Updated: 07/20/23 1537     Anisocytosis Slight/1+     Hypochromia Slight/1+     WBC Morphology Normal     Platelet Estimate Adequate             XR Chest 1 View    Result Date: 7/20/2023  PROCEDURE: XR CHEST 1 VW-    HISTORY: soa  COMPARISON: June 12, 2023.  FINDINGS: The heart is enlarged, but stable. Pulmonary vascular congestion is stable. There is mild interstitial disease bilaterally which is stable.  There is no pneumothorax. There are no acute osseous abnormalities.      Impression: No significant interval change.        Images were reviewed, interpreted, and dictated by Dr. Helena Salcido MD Transcribed by Charo Marshall PA-C.  This report was signed and finalized on 7/20/2023 3:45 PM by Helena Salcido MD.        Medical Decision Making  Amount and/or Complexity of Data Reviewed  Labs: ordered. Decision-making details documented in ED Course.  Radiology: ordered.  ECG/medicine tests: ordered.    Risk  Prescription drug management.        Claudia Stokes is a 73 y.o. female who presents to the emergency department for evaluation of shortness of breath    Differential diagnosis includes exacerbation, pneumonia, CHF among other etiologies.    ED, CMP, BNP, chest x-ray ordered for further evaluation of the patient's presentation.    Chart review if available included outside testing, previous visits, prior labs, prior imaging, available notes from prior evaluations or visits with specialists, medication list, allergies, past medical history, past surgical history when applicable.    Patient was treated with Lasix and DuoNeb and BiPAP    Patient appears to have edema in her bilateral lower extremities, hypoxia on her home oxygen, elevated BNP, treated with Lasix  discussed with Dr. Womack will admit.    Plan for disposition is admission.  Patient/family comfortable with and understanding of the plan.      Final diagnoses:   None          Abdulaziz Rosado PA-C  07/20/23 1604

## 2023-07-21 LAB
A-A DO2: ABNORMAL
ANION GAP SERPL CALCULATED.3IONS-SCNC: 4.3 MMOL/L (ref 5–15)
ARTERIAL PATENCY WRIST A: ABNORMAL
ATMOSPHERIC PRESS: 731 MMHG
BASE EXCESS BLDA CALC-SCNC: 12.7 MMOL/L (ref 0–2)
BDY SITE: ABNORMAL
BUN SERPL-MCNC: 21 MG/DL (ref 8–23)
BUN/CREAT SERPL: 28.4 (ref 7–25)
CALCIUM SPEC-SCNC: 8.8 MG/DL (ref 8.6–10.5)
CHLORIDE SERPL-SCNC: 91 MMOL/L (ref 98–107)
CO2 SERPL-SCNC: 40.7 MMOL/L (ref 22–29)
COHGB MFR BLD: 1.6 % (ref 0–2)
CREAT SERPL-MCNC: 0.74 MG/DL (ref 0.57–1)
EGFRCR SERPLBLD CKD-EPI 2021: 86.1 ML/MIN/1.73
GAS FLOW AIRWAY: 4 LPM
GLUCOSE SERPL-MCNC: 161 MG/DL (ref 65–99)
HCO3 BLDA-SCNC: 40.8 MMOL/L (ref 22–28)
HCT VFR BLD CALC: 34.5 %
Lab: ABNORMAL
Lab: ABNORMAL
METHGB BLD QL: 0.5 % (ref 0–1.5)
MODALITY: ABNORMAL
NOTE: ABNORMAL
NOTIFIED BY: ABNORMAL
NOTIFIED WHO: ABNORMAL
OXYHGB MFR BLDV: 91.2 % (ref 94–99)
PCO2 BLDA: 72.3 MM HG (ref 35–45)
PCO2 TEMP ADJ BLD: ABNORMAL MM[HG]
PH BLDA: 7.36 PH UNITS (ref 7.35–7.45)
PH, TEMP CORRECTED: ABNORMAL
PO2 BLDA: 66.5 MM HG (ref 75–100)
PO2 TEMP ADJ BLD: ABNORMAL MM[HG]
POTASSIUM SERPL-SCNC: 5 MMOL/L (ref 3.5–5.2)
SAO2 % BLDCOA: 93.2 % (ref 94–100)
SODIUM SERPL-SCNC: 136 MMOL/L (ref 136–145)
VENTILATOR MODE: ABNORMAL

## 2023-07-21 PROCEDURE — 80048 BASIC METABOLIC PNL TOTAL CA: CPT | Performed by: FAMILY MEDICINE

## 2023-07-21 PROCEDURE — 25010000002 AMIODARONE IN DEXTROSE 5% 360-4.14 MG/200ML-% SOLUTION: Performed by: INTERNAL MEDICINE

## 2023-07-21 PROCEDURE — 97162 PT EVAL MOD COMPLEX 30 MIN: CPT

## 2023-07-21 PROCEDURE — 94664 DEMO&/EVAL PT USE INHALER: CPT

## 2023-07-21 PROCEDURE — 63710000001 PREDNISONE PER 1 MG: Performed by: FAMILY MEDICINE

## 2023-07-21 PROCEDURE — 99233 SBSQ HOSP IP/OBS HIGH 50: CPT | Performed by: FAMILY MEDICINE

## 2023-07-21 PROCEDURE — 99223 1ST HOSP IP/OBS HIGH 75: CPT | Performed by: INTERNAL MEDICINE

## 2023-07-21 PROCEDURE — 94761 N-INVAS EAR/PLS OXIMETRY MLT: CPT

## 2023-07-21 PROCEDURE — 82375 ASSAY CARBOXYHB QUANT: CPT

## 2023-07-21 PROCEDURE — 94799 UNLISTED PULMONARY SVC/PX: CPT

## 2023-07-21 PROCEDURE — 25010000002 AMIODARONE IN DEXTROSE 5% 150-4.21 MG/100ML-% SOLUTION: Performed by: INTERNAL MEDICINE

## 2023-07-21 PROCEDURE — 97165 OT EVAL LOW COMPLEX 30 MIN: CPT

## 2023-07-21 PROCEDURE — 83050 HGB METHEMOGLOBIN QUAN: CPT

## 2023-07-21 PROCEDURE — 99222 1ST HOSP IP/OBS MODERATE 55: CPT | Performed by: INTERNAL MEDICINE

## 2023-07-21 PROCEDURE — 36600 WITHDRAWAL OF ARTERIAL BLOOD: CPT

## 2023-07-21 PROCEDURE — 82805 BLOOD GASES W/O2 SATURATION: CPT

## 2023-07-21 RX ORDER — ACETAMINOPHEN 325 MG/1
650 TABLET ORAL EVERY 6 HOURS PRN
COMMUNITY

## 2023-07-21 RX ORDER — HYDROXYZINE PAMOATE 25 MG/1
25 CAPSULE ORAL 3 TIMES DAILY PRN
Status: DISCONTINUED | OUTPATIENT
Start: 2023-07-21 | End: 2023-07-22 | Stop reason: HOSPADM

## 2023-07-21 RX ORDER — ESCITALOPRAM OXALATE 20 MG/1
20 TABLET ORAL DAILY
COMMUNITY
End: 2023-07-22 | Stop reason: HOSPADM

## 2023-07-21 RX ORDER — IBUPROFEN 200 MG
200 TABLET ORAL EVERY 6 HOURS PRN
COMMUNITY
End: 2023-07-22 | Stop reason: HOSPADM

## 2023-07-21 RX ADMIN — Medication 10 ML: at 20:47

## 2023-07-21 RX ADMIN — IPRATROPIUM BROMIDE AND ALBUTEROL SULFATE 3 ML: .5; 3 SOLUTION RESPIRATORY (INHALATION) at 13:01

## 2023-07-21 RX ADMIN — TIOTROPIUM BROMIDE INHALATION SPRAY 2 PUFF: 3.12 SPRAY, METERED RESPIRATORY (INHALATION) at 12:51

## 2023-07-21 RX ADMIN — LISINOPRIL 10 MG: 10 TABLET ORAL at 20:46

## 2023-07-21 RX ADMIN — BUDESONIDE 0.5 MG: 0.5 INHALANT RESPIRATORY (INHALATION) at 19:20

## 2023-07-21 RX ADMIN — AMIODARONE HYDROCHLORIDE 1 MG/MIN: 1.8 INJECTION, SOLUTION INTRAVENOUS at 15:28

## 2023-07-21 RX ADMIN — SOTALOL HYDROCHLORIDE 80 MG: 80 TABLET ORAL at 08:33

## 2023-07-21 RX ADMIN — DULOXETINE HYDROCHLORIDE 60 MG: 30 CAPSULE, DELAYED RELEASE ORAL at 08:33

## 2023-07-21 RX ADMIN — IPRATROPIUM BROMIDE AND ALBUTEROL SULFATE 3 ML: .5; 3 SOLUTION RESPIRATORY (INHALATION) at 07:08

## 2023-07-21 RX ADMIN — IPRATROPIUM BROMIDE AND ALBUTEROL SULFATE 3 ML: .5; 3 SOLUTION RESPIRATORY (INHALATION) at 19:20

## 2023-07-21 RX ADMIN — AMIODARONE HYDROCHLORIDE 0.5 MG/MIN: 1.8 INJECTION, SOLUTION INTRAVENOUS at 20:54

## 2023-07-21 RX ADMIN — AMIODARONE HYDROCHLORIDE 150 MG: 1.5 INJECTION, SOLUTION INTRAVENOUS at 15:15

## 2023-07-21 RX ADMIN — TEMAZEPAM 15 MG: 15 CAPSULE ORAL at 20:46

## 2023-07-21 RX ADMIN — DULOXETINE HYDROCHLORIDE 60 MG: 30 CAPSULE, DELAYED RELEASE ORAL at 20:46

## 2023-07-21 RX ADMIN — BUDESONIDE 0.5 MG: 0.5 INHALANT RESPIRATORY (INHALATION) at 07:08

## 2023-07-21 RX ADMIN — CYCLOBENZAPRINE 5 MG: 10 TABLET, FILM COATED ORAL at 20:46

## 2023-07-21 RX ADMIN — APIXABAN 5 MG: 5 TABLET, FILM COATED ORAL at 08:33

## 2023-07-21 RX ADMIN — PANTOPRAZOLE SODIUM 40 MG: 40 TABLET, DELAYED RELEASE ORAL at 20:46

## 2023-07-21 RX ADMIN — PREDNISONE 40 MG: 20 TABLET ORAL at 08:33

## 2023-07-21 RX ADMIN — ROPINIROLE HYDROCHLORIDE 3 MG: 1 TABLET, FILM COATED ORAL at 20:46

## 2023-07-21 RX ADMIN — APIXABAN 5 MG: 5 TABLET, FILM COATED ORAL at 20:46

## 2023-07-21 NOTE — PLAN OF CARE
Goal Outcome Evaluation:  Plan of Care Reviewed With: patient      VSS. Pt titrated down to 4 L NC, maintaining o2 >90%. Pt encouraged to wear NIPPV throughout the shift. Medications administered per MAR. Amiodarone drip initiated during shift per orders. Wound care completed per orders. Discharge is pending.       Problem: Adult Inpatient Plan of Care  Goal: Plan of Care Review  Outcome: Ongoing, Progressing  Flowsheets (Taken 7/21/2023 1805)  Plan of Care Reviewed With: patient  Goal: Patient-Specific Goal (Individualized)  Outcome: Ongoing, Progressing  Goal: Absence of Hospital-Acquired Illness or Injury  Outcome: Ongoing, Progressing  Intervention: Identify and Manage Fall Risk  Recent Flowsheet Documentation  Taken 7/21/2023 1745 by Anamaria Torres RN  Safety Promotion/Fall Prevention:   activity supervised   assistive device/personal items within reach   clutter free environment maintained   toileting scheduled   safety round/check completed   room organization consistent   lighting adjusted   fall prevention program maintained   nonskid shoes/slippers when out of bed  Taken 7/21/2023 1553 by Anamaria Torres, RN  Safety Promotion/Fall Prevention:   activity supervised   assistive device/personal items within reach   clutter free environment maintained   toileting scheduled   safety round/check completed   room organization consistent   lighting adjusted   fall prevention program maintained   nonskid shoes/slippers when out of bed  Taken 7/21/2023 1434 by Anamaria Torres, RN  Safety Promotion/Fall Prevention:   activity supervised   assistive device/personal items within reach   clutter free environment maintained   toileting scheduled   safety round/check completed   room organization consistent   lighting adjusted   fall prevention program maintained   nonskid shoes/slippers when out of bed  Taken 7/21/2023 1200 by Anamaria Torres, RN  Safety Promotion/Fall Prevention:   assistive device/personal items within  reach   activity supervised   clutter free environment maintained   toileting scheduled   safety round/check completed   room organization consistent   lighting adjusted   fall prevention program maintained  Taken 7/21/2023 0940 by Anamaria Torres RN  Safety Promotion/Fall Prevention:   activity supervised   clutter free environment maintained   assistive device/personal items within reach   toileting scheduled   safety round/check completed   room organization consistent   lighting adjusted   fall prevention program maintained  Taken 7/21/2023 0818 by Anamaria Torres RN  Safety Promotion/Fall Prevention:   assistive device/personal items within reach   activity supervised   clutter free environment maintained   toileting scheduled   safety round/check completed   room organization consistent   lighting adjusted   fall prevention program maintained   nonskid shoes/slippers when out of bed  Intervention: Prevent Skin Injury  Recent Flowsheet Documentation  Taken 7/21/2023 1745 by Anamaria Torres RN  Body Position: sitting up in bed  Skin Protection:   adhesive use limited   tubing/devices free from skin contact  Taken 7/21/2023 1553 by Anamaria Torres RN  Body Position: sitting up in bed  Taken 7/21/2023 1434 by Anamaria Torres RN  Body Position: supine  Skin Protection:   adhesive use limited   tubing/devices free from skin contact  Taken 7/21/2023 1200 by Anamaria Torres RN  Body Position: weight shifting  Taken 7/21/2023 0940 by Anamaria Torres RN  Body Position: sitting up in bed  Skin Protection:   adhesive use limited   tubing/devices free from skin contact  Taken 7/21/2023 0818 by Anamaria Torres RN  Body Position: sitting up in bed  Skin Protection:   adhesive use limited   incontinence pads utilized   tubing/devices free from skin contact  Intervention: Prevent and Manage VTE (Venous Thromboembolism) Risk  Recent Flowsheet Documentation  Taken 7/21/2023 1745 by Anamaria Torres RN  Activity Management: activity  encouraged  Taken 7/21/2023 1553 by Anamaria Torres RN  Activity Management: activity encouraged  Taken 7/21/2023 1434 by Anamaria Torres RN  Activity Management: activity encouraged  Taken 7/21/2023 1200 by Anamaria Torres RN  Activity Management: activity encouraged  Taken 7/21/2023 0940 by Anamaria Torres RN  Activity Management: activity encouraged  Taken 7/21/2023 0818 by Anamaria Torres RN  Activity Management: activity encouraged  VTE Prevention/Management: (See MAR) other (see comments)  Range of Motion: active ROM (range of motion) encouraged  Intervention: Prevent Infection  Recent Flowsheet Documentation  Taken 7/21/2023 1745 by Anamaria Torres RN  Infection Prevention:   single patient room provided   rest/sleep promoted   hand hygiene promoted   equipment surfaces disinfected   environmental surveillance performed  Taken 7/21/2023 1553 by Anamaria Torres RN  Infection Prevention:   single patient room provided   rest/sleep promoted   hand hygiene promoted   equipment surfaces disinfected   environmental surveillance performed  Taken 7/21/2023 1434 by Anamaria Torres RN  Infection Prevention:   single patient room provided   rest/sleep promoted   hand hygiene promoted   equipment surfaces disinfected   environmental surveillance performed  Taken 7/21/2023 1200 by Anamaria Torres RN  Infection Prevention:   single patient room provided   rest/sleep promoted   hand hygiene promoted   equipment surfaces disinfected   environmental surveillance performed  Taken 7/21/2023 0940 by Anamaria Torres RN  Infection Prevention:   single patient room provided   rest/sleep promoted   hand hygiene promoted   equipment surfaces disinfected   environmental surveillance performed  Taken 7/21/2023 0818 by Anamaria Torres RN  Infection Prevention:   single patient room provided   rest/sleep promoted   hand hygiene promoted   equipment surfaces disinfected   environmental surveillance performed  Goal: Optimal Comfort and  Wellbeing  Outcome: Ongoing, Progressing  Intervention: Monitor Pain and Promote Comfort  Recent Flowsheet Documentation  Taken 7/21/2023 1553 by Anamaria Torres RN  Pain Management Interventions:   position adjusted   pillow support provided  Taken 7/21/2023 1200 by Anamaria Torres RN  Pain Management Interventions:   position adjusted   pillow support provided  Taken 7/21/2023 0818 by Anamaria Torres RN  Pain Management Interventions:   position adjusted   pillow support provided  Intervention: Provide Person-Centered Care  Recent Flowsheet Documentation  Taken 7/21/2023 0818 by Anamaria Torres RN  Trust Relationship/Rapport:   care explained   choices provided   emotional support provided   empathic listening provided   questions answered   questions encouraged   reassurance provided   thoughts/feelings acknowledged  Goal: Readiness for Transition of Care  Outcome: Ongoing, Progressing     Problem: Skin Injury Risk Increased  Goal: Skin Health and Integrity  Outcome: Ongoing, Progressing  Intervention: Optimize Skin Protection  Recent Flowsheet Documentation  Taken 7/21/2023 1745 by Anamaria Torres RN  Pressure Reduction Techniques:   frequent weight shift encouraged   weight shift assistance provided   pressure points protected   positioned off wounds  Head of Bed (HOB) Positioning: HOB elevated  Skin Protection:   adhesive use limited   tubing/devices free from skin contact  Taken 7/21/2023 1553 by Anamaria Torres RN  Head of Bed (HOB) Positioning: HOB elevated  Taken 7/21/2023 1434 by Anamaria Torres RN  Pressure Reduction Techniques:   frequent weight shift encouraged   weight shift assistance provided   pressure points protected  Head of Bed (HOB) Positioning: HOB elevated  Skin Protection:   adhesive use limited   tubing/devices free from skin contact  Taken 7/21/2023 1200 by Anamaria Torres RN  Head of Bed (HOB) Positioning: HOB elevated  Taken 7/21/2023 0940 by Anamaria Torres RN  Pressure Reduction Techniques:    frequent weight shift encouraged   weight shift assistance provided   pressure points protected   positioned off wounds  Head of Bed (HOB) Positioning: HOB elevated  Skin Protection:   adhesive use limited   tubing/devices free from skin contact  Taken 7/21/2023 0818 by Anamaria Torres RN  Pressure Reduction Techniques:   frequent weight shift encouraged   weight shift assistance provided   pressure points protected   positioned off wounds  Head of Bed (HOB) Positioning: HOB elevated  Pressure Reduction Devices: specialty bed utilized  Skin Protection:   adhesive use limited   incontinence pads utilized   tubing/devices free from skin contact     Problem: COPD (Chronic Obstructive Pulmonary Disease) Comorbidity  Goal: Maintenance of COPD Symptom Control  Outcome: Ongoing, Progressing  Intervention: Maintain COPD-Symptom Control  Recent Flowsheet Documentation  Taken 7/21/2023 1745 by Anamaria Torres RN  Medication Review/Management: medications reviewed  Taken 7/21/2023 1553 by Anamaria Torres RN  Medication Review/Management: medications reviewed  Taken 7/21/2023 1434 by Anamaria Torres RN  Medication Review/Management: medications reviewed  Taken 7/21/2023 1200 by Anamaria Torres RN  Medication Review/Management: medications reviewed  Taken 7/21/2023 0940 by Anamaria Torres RN  Medication Review/Management: medications reviewed  Taken 7/21/2023 0818 by Anamaria Torres RN  Medication Review/Management: medications reviewed     Problem: Heart Failure Comorbidity  Goal: Maintenance of Heart Failure Symptom Control  Outcome: Ongoing, Progressing  Intervention: Maintain Heart Failure-Management  Recent Flowsheet Documentation  Taken 7/21/2023 1745 by Anamaria Torres RN  Medication Review/Management: medications reviewed  Taken 7/21/2023 1553 by Anamaria Torres RN  Medication Review/Management: medications reviewed  Taken 7/21/2023 1434 by Anamaria Torres RN  Medication Review/Management: medications reviewed  Taken 7/21/2023  1200 by Anamaria Torres RN  Medication Review/Management: medications reviewed  Taken 7/21/2023 0940 by Anamaria Torres RN  Medication Review/Management: medications reviewed  Taken 7/21/2023 0818 by Anamaria Torres RN  Medication Review/Management: medications reviewed     Problem: Hypertension Comorbidity  Goal: Blood Pressure in Desired Range  Outcome: Ongoing, Progressing  Intervention: Maintain Blood Pressure Management  Recent Flowsheet Documentation  Taken 7/21/2023 1745 by Anamaria Torres RN  Medication Review/Management: medications reviewed  Taken 7/21/2023 1553 by Anamaria Torres RN  Medication Review/Management: medications reviewed  Taken 7/21/2023 1434 by Anamaria Torres RN  Medication Review/Management: medications reviewed  Taken 7/21/2023 1200 by Anamaria Torres RN  Medication Review/Management: medications reviewed  Taken 7/21/2023 0940 by Anamaria Torres RN  Medication Review/Management: medications reviewed  Taken 7/21/2023 0818 by Anamaria Torres RN  Medication Review/Management: medications reviewed     Problem: Obstructive Sleep Apnea Risk or Actual Comorbidity Management  Goal: Unobstructed Breathing During Sleep  Outcome: Ongoing, Progressing     Problem: Fall Injury Risk  Goal: Absence of Fall and Fall-Related Injury  Outcome: Ongoing, Progressing  Intervention: Identify and Manage Contributors  Recent Flowsheet Documentation  Taken 7/21/2023 1745 by Anamaria Torres RN  Medication Review/Management: medications reviewed  Taken 7/21/2023 1553 by Anamaria Torres RN  Medication Review/Management: medications reviewed  Self-Care Promotion:   independence encouraged   BADL personal objects within reach   BADL personal routines maintained   meal set-up provided  Taken 7/21/2023 1434 by Anamaria Torres RN  Medication Review/Management: medications reviewed  Taken 7/21/2023 1200 by Anamaria Torres RN  Medication Review/Management: medications reviewed  Self-Care Promotion:   independence encouraged   BADL  personal objects within reach   BADL personal routines maintained   meal set-up provided  Taken 7/21/2023 0940 by Anamaria Torres RN  Medication Review/Management: medications reviewed  Taken 7/21/2023 0818 by Anamaria Torres RN  Medication Review/Management: medications reviewed  Self-Care Promotion:   BADL personal objects within reach   independence encouraged   BADL personal routines maintained   meal set-up provided  Intervention: Promote Injury-Free Environment  Recent Flowsheet Documentation  Taken 7/21/2023 1745 by Anamaria Torres RN  Safety Promotion/Fall Prevention:   activity supervised   assistive device/personal items within reach   clutter free environment maintained   toileting scheduled   safety round/check completed   room organization consistent   lighting adjusted   fall prevention program maintained   nonskid shoes/slippers when out of bed  Taken 7/21/2023 1553 by Anamaria Torres RN  Safety Promotion/Fall Prevention:   activity supervised   assistive device/personal items within reach   clutter free environment maintained   toileting scheduled   safety round/check completed   room organization consistent   lighting adjusted   fall prevention program maintained   nonskid shoes/slippers when out of bed  Taken 7/21/2023 1434 by Anamaria Torres RN  Safety Promotion/Fall Prevention:   activity supervised   assistive device/personal items within reach   clutter free environment maintained   toileting scheduled   safety round/check completed   room organization consistent   lighting adjusted   fall prevention program maintained   nonskid shoes/slippers when out of bed  Taken 7/21/2023 1200 by Anamaria Torres, JULIA  Safety Promotion/Fall Prevention:   assistive device/personal items within reach   activity supervised   clutter free environment maintained   toileting scheduled   safety round/check completed   room organization consistent   lighting adjusted   fall prevention program maintained  Taken 7/21/2023  0940 by Anamaria Torres, RN  Safety Promotion/Fall Prevention:   activity supervised   clutter free environment maintained   assistive device/personal items within reach   toileting scheduled   safety round/check completed   room organization consistent   lighting adjusted   fall prevention program maintained  Taken 7/21/2023 0818 by Anamaria Torres, RN  Safety Promotion/Fall Prevention:   assistive device/personal items within reach   activity supervised   clutter free environment maintained   toileting scheduled   safety round/check completed   room organization consistent   lighting adjusted   fall prevention program maintained   nonskid shoes/slippers when out of bed     Problem: Adjustment to Illness (Heart Failure)  Goal: Optimal Coping  Outcome: Ongoing, Progressing  Intervention: Support Psychosocial Response  Recent Flowsheet Documentation  Taken 7/21/2023 0818 by Anamaria Torres, RN  Family/Support System Care:   support provided   self-care encouraged     Problem: Cardiac Output Decreased (Heart Failure)  Goal: Optimal Cardiac Output  Outcome: Ongoing, Progressing     Problem: Dysrhythmia (Heart Failure)  Goal: Stable Heart Rate and Rhythm  Outcome: Ongoing, Progressing     Problem: Fluid Imbalance (Heart Failure)  Goal: Fluid Balance  Outcome: Ongoing, Progressing     Problem: Functional Ability Impaired (Heart Failure)  Goal: Optimal Functional Ability  Outcome: Ongoing, Progressing  Intervention: Optimize Functional Ability  Recent Flowsheet Documentation  Taken 7/21/2023 1745 by Anamaria Torres, RN  Activity Management: activity encouraged  Taken 7/21/2023 1553 by Anamaria Torres, RN  Activity Management: activity encouraged  Self-Care Promotion:   independence encouraged   BADL personal objects within reach   BADL personal routines maintained   meal set-up provided  Taken 7/21/2023 1434 by Anamaria Torres, RN  Activity Management: activity encouraged  Taken 7/21/2023 1200 by Anamaria Torres, RN  Activity  Management: activity encouraged  Self-Care Promotion:   independence encouraged   BADL personal objects within reach   BADL personal routines maintained   meal set-up provided  Taken 7/21/2023 0940 by Anamaria Torres RN  Activity Management: activity encouraged  Taken 7/21/2023 0818 by Anamaria Torres RN  Activity Management: activity encouraged  Self-Care Promotion:   BADL personal objects within reach   independence encouraged   BADL personal routines maintained   meal set-up provided     Problem: Oral Intake Inadequate (Heart Failure)  Goal: Optimal Nutrition Intake  Outcome: Ongoing, Progressing     Problem: Respiratory Compromise (Heart Failure)  Goal: Effective Oxygenation and Ventilation  Outcome: Ongoing, Progressing  Intervention: Promote Airway Secretion Clearance  Recent Flowsheet Documentation  Taken 7/21/2023 1553 by Anamaria Torres RN  Cough And Deep Breathing: done independently per patient  Taken 7/21/2023 1200 by Anamaria Torres RN  Cough And Deep Breathing: done independently per patient  Taken 7/21/2023 0818 by Anamaria Torres RN  Cough And Deep Breathing: done independently per patient     Problem: Sleep Disordered Breathing (Heart Failure)  Goal: Effective Breathing Pattern During Sleep  Outcome: Ongoing, Progressing     Problem: Activity Intolerance (Pulmonary Impairment)  Goal: Improved Activity Tolerance  Outcome: Ongoing, Progressing     Problem: Airway Clearance Ineffective (Pulmonary Impairment)  Goal: Effective Airway Clearance  Outcome: Ongoing, Progressing     Problem: Gas Exchange Impaired (Pulmonary Impairment)  Goal: Optimal Gas Exchange  Outcome: Ongoing, Progressing

## 2023-07-21 NOTE — THERAPY EVALUATION
Patient Name: Claudia Stokes  : 1950    MRN: [id number]                              Today's Date: 2023       Admit Date: 2023    Visit Dx:     ICD-10-CM ICD-9-CM   1. Impaired mobility and ADLs  Z74.09 V49.89    Z78.9    2. Chronic respiratory failure  J96.10 518.83   3. Chronic obstructive pulmonary disease (COPD)  J44.9 496     Patient Active Problem List   Diagnosis    Impaired mobility and ADLs    Other emphysema    RLS (restless legs syndrome)    Primary hypertension    Current moderate episode of major depressive disorder without prior episode    Arthritis pain    Gastroesophageal reflux disease with esophagitis without hemorrhage    Interstitial lung disease    History of tobacco use, presenting hazards to health    Confusion    Paroxysmal atrial fibrillation    Depression with anxiety    Morbid obesity    New onset of congestive heart failure    Acute on chronic respiratory failure with hypoxia and hypercapnia     Past Medical History:   Diagnosis Date    A-fib     COPD (chronic obstructive pulmonary disease)     Hypertension     Impaired functional mobility, balance, gait, and endurance      Past Surgical History:   Procedure Laterality Date    BREAST BIOPSY      VENA CAVA FILTER PLACEMENT        General Information       Row Name 23 1253          OT Time and Intention    Document Type evaluation  -SD     Mode of Treatment occupational therapy  -SD       Row Name 23 1253          General Information    Patient Profile Reviewed yes  -SD     Prior Level of Function independent:;all household mobility;ADL's  Patient lives with her son and his GF; walks short distances by holding to furniture. Has a rollator, but states it won't fit through the house and a WC for community. Has a tub with shower chair and grab bars, is ind with self care. Wears 3L O2  @ home  -SD     Existing Precautions/Restrictions fall;oxygen therapy device and L/min  -SD     Barriers to Rehab medically  complex;previous functional deficit  -SD       Row Name 07/21/23 1253          Living Environment    People in Home child(yaa), adult  -SD       Row Name 07/21/23 1253          Home Main Entrance    Number of Stairs, Main Entrance one  -SD     Stair Railings, Main Entrance none  -SD       Row Name 07/21/23 1253          Stairs Within Home, Primary    Stairs, Within Home, Primary home has a basement but patient doesn't use it  -SD       [facility] Name 07/21/23 1253          Cognition    Orientation Status (Cognition) oriented x 4  -SD       [facility] Name 07/21/23 1253          Safety Issues, Functional Mobility    Safety Issues Affecting Function (Mobility) safety precautions follow-through/compliance;safety precaution awareness  -SD     Impairments Affecting Function (Mobility) balance;endurance/activity tolerance;shortness of breath;strength  -SD               User Key  (r) = Recorded By, (t) = Taken By, (c) = Cosigned By      Initials Name Provider Type    SD Kaleigh Zaragoza OT Occupational Therapist                     Mobility/ADL's       [facility] Name 07/21/23 1256          Bed Mobility    Comment, (Bed Mobility) sitting EOB  -SD       [facility] Name 07/21/23 1256          Transfers    Transfers sit-stand transfer  -SD       Row Name 07/21/23 1256          Sit-Stand Transfer    Sit-Stand [place] (Transfers) contact guard  -SD     Comment, (Sit-Stand Transfer) HHA, gait belt.  -SD       Row Name 07/21/23 1256          Functional Mobility    Functional Mobility- Ind. Level contact guard assist  -SD     Functional Mobility-Distance (Feet) 52  -SD     Functional Mobility- Safety Issues balance decreased during turns;sequencing ability decreased;step length decreased;weight-shifting ability decreased;supplemental O2  -SD       [facility] Name 07/21/23 1256          Activities of Daily Living    BADL Assessment/Intervention bathing;upper body dressing;lower body dressing;grooming;feeding;toileting  -SD       [facility] Name 07/21/23 1256           Bathing Assessment/Intervention    [place] Level (Bathing) minimum assist (75% patient effort)  -SD     Comment, (Bathing) would benefit from LH Sponge and bath bench  -SD       [facility] Name 07/21/23 1256          Upper Body Dressing Assessment/Training    [place] Level (Upper Body Dressing) set up  -SD       Row Name 07/21/23 1256          Lower Body Dressing Assessment/Training    [place] Level (Lower Body Dressing) don;pants/bottoms;minimum assist (75% patient effort)  -SD     Comment, (Lower Body Dressing) wears slip on shoes, no socks. Would benefit from a reacher  -SD       [facility] Name 07/21/23 1256          Grooming Assessment/Training    [place] Level (Grooming) set up  -SD       [facility] Name 07/21/23 1256          Self-Feeding Assessment/Training    [place] Level (Feeding) set up  -SD       Row Name 07/21/23 1256          Toileting Assessment/Training    [place] Level (Toileting) perform perineal hygiene;minimum assist (75% patient effort)  -SD     Comment, (Toileting) would benefit from Long handled peter-aid d/t difficulty with hygiene  -SD               User Key  (r) = Recorded By, (t) = Taken By, (c) = Cosigned By      Initials Name Provider Type    Kaleigh Olvera OT Occupational Therapist                   Obj/Interventions       Row Name 07/21/23 1258          Range of Motion Comprehensive    General Range of Motion bilateral upper extremity ROM WFL  -SD       Row Name 07/21/23 1258          Strength Comprehensive (MMT)    General Manual Muscle Testing (MMT) Assessment upper extremity strength deficits identified  -SD       Row Name 07/21/23 1258          Upper Extremity (Manual Muscle Testing)    Comment, MMT: Upper Extremity UB 4-/5  -SD               User Key  (r) = Recorded By, (t) = Taken By, (c) = Cosigned By      Initials Name Provider Type    SD Kaleigh Zaragoza OT Occupational Therapist                   Goals/Plan       Row Name 07/21/23 1305          Transfer  Goal 1 (OT)    Activity/Assistive Device (Transfer Goal 1, OT) sit-to-stand/stand-to-sit;walker, rolling  -SD     [place] Level/Cues Needed (Transfer Goal 1, OT) standby assist  -SD     Time Frame (Transfer Goal 1, OT) long term goal (LTG)  -SD     Progress/Outcome (Transfer Goal 1, OT) goal ongoing  -SD       Row Name 07/21/23 1305          Dressing Goal 1 (OT)    Activity/Device (Dressing Goal 1, OT) lower body dressing  -SD     [place]/Cues Needed (Dressing Goal 1, OT) standby assist  -SD     Time Frame (Dressing Goal 1, OT) 2 weeks  -SD     Progress/Outcome (Dressing Goal 1, OT) goal ongoing  -SD       Row Name 07/21/23 1305          Toileting Goal 1 (OT)    Activity/Device (Toileting Goal 1, OT) toileting skills, all;commode  -SD     [place] Level/Cues Needed (Toileting Goal 1, OT) standby assist  -SD     Time Frame (Toileting Goal 1, OT) 2 weeks  -SD     Progress/Outcome (Toileting Goal 1, OT) goal ongoing  -SD       Row Name 07/21/23 1305          Strength Goal 1 (OT)    Strength Goal 1 (OT) Patient to perform UB ther ex as tolerated  -SD     Time Frame (Strength Goal 1, OT) long term goal (LTG)  -SD     Progress/Outcome (Strength Goal 1, OT) goal ongoing  -SD       Row Name 07/21/23 1302          Therapy Assessment/Plan (OT)    Planned Therapy Interventions (OT) activity tolerance training;adaptive equipment training;BADL retraining;patient/caregiver education/training;ROM/therapeutic exercise;strengthening exercise;transfer/mobility retraining  -SD               User Key  (r) = Recorded By, (t) = Taken By, (c) = Cosigned By      Initials Name Provider Type    Kaleigh Olvera OT Occupational Therapist                   Clinical Impression       Row Name 07/21/23 5527          Pain Assessment    Pretreatment Pain Rating 0/10 - no pain  -SD     Posttreatment Pain Rating 0/10 - no pain  -SD     Pre/Posttreatment Pain Comment R knee pain with mobility  -SD       Row Name 07/21/23 4153           Plan of Care Review    Plan of Care Reviewed With patient  -SD     Progress no change  -SD     Outcome Evaluation OT eval completed. Patient sitting EOB, no c/o, is on 5L O2 satting 94%. Patient is able to perform ADLs with CGA-Min A; discussed AE that would be beneficial for patient to conserve energy, including reacher, long handled sponge, long handled peter-aid and a bath bench. Patient performed sit to stand and functional mobility with CGA and HHA x 52'. Patient satting 88% upon returning to chair, returned to 94% after resting. Encouraged patient to use a walker to aid in breathing and alleviate knee pain. Patient states she has a rollator, but it won't fit through the house; would benefit from a regular RW and HH services at [place]. Is interested in going to pulmonary rehab, and would be beneficial if transportation could be arranged. Patient is expected to benefit from continued OT services prior to [place].  -SD       Row Name 07/21/23 1253          Therapy Assessment/Plan (OT)    Patient/Family Therapy Goal Statement (OT) home  -SD     Rehab Potential (OT) good, to achieve stated therapy goals  -SD     Criteria for Skilled Therapeutic Interventions Met (OT) skilled treatment is necessary  -SD     Therapy Frequency (OT) 3 times/wk  5 times if indicated  -SD       Row Name 07/21/23 125          Therapy Plan Review/Discharge Plan (OT)    Equipment Needs Upon Discharge (OT) walker, rolling;tub bench;reacher;other (see comments)  long handled sponge/peter-aid  -SD     Anticipated Discharge Disposition (OT) home with assist;home with home health;other (see comments)  pulmonary rehab  -SD       Row Name 07/21/23 1252          Vital Signs    Pre SpO2 (%) 94  -SD     O2 Delivery Pre Treatment supplemental O2  -SD     Intra SpO2 (%) 88  -SD     O2 Delivery Intra Treatment supplemental O2  -SD     Post SpO2 (%) 94  -SD     O2 Delivery Post Treatment supplemental O2  -SD       Row Name 07/21/23 125          Positioning and  Restraints    Pre-Treatment Position in bed  -SD     Post Treatment Position chair  -SD     In Chair sitting;call light within reach;encouraged to call for assist  -SD               User Key  (r) = Recorded By, (t) = Taken By, (c) = Cosigned By      Initials Name Provider Type    Kaleigh Olvera OT Occupational Therapist                   Outcome Measures       Row Name 07/21/23 1306          How much help from another is currently needed...    Putting on and taking off regular lower body clothing? 3  -SD     Bathing (including washing, rinsing, and drying) 3  -SD     Toileting (which includes using toilet bed pan or urinal) 3  -SD     Putting on and taking off regular upper body clothing 3  -SD     Taking care of personal grooming (such as brushing teeth) 4  -SD     Eating meals 4  -SD     AM-PAC 6 Clicks Score (OT) 20  -SD       Row Name 07/21/23 0818          How much help from another person do you currently need...    Turning from your back to your side while in flat bed without using bedrails? 4  -KJ     Moving from lying on back to sitting on the side of a flat bed without bedrails? 4  -KJ     Moving to and from a bed to a chair (including a wheelchair)? 3  -KJ     Standing up from a chair using your arms (e.g., wheelchair, bedside chair)? 3  -KJ     Climbing 3-5 steps with a railing? 2  -KJ     To walk in hospital room? 2  -KJ     AM-PAC 6 Clicks Score (PT) 18  -KJ     Highest level of mobility 6 --> Walked 10 steps or more  -KJ       Row Name 07/21/23 1306          Functional Assessment    Outcome Measure Options AM-PAC 6 Clicks Daily Activity (OT)  -SD               User Key  (r) = Recorded By, (t) = Taken By, (c) = Cosigned By      Initials Name Provider Type    Kaleigh Olvera OT Occupational Therapist    Anamaria Kirk, RN Registered Nurse                    Occupational Therapy Education       Title: PT OT SLP Therapies (In Progress)       Topic: Occupational Therapy (In Progress)        Point: ADL training (Done)       Description:   Instruct learner(s) on proper safety adaptation and remediation techniques during self care or transfers.   Instruct in proper use of assistive devices.                  Learning Progress Summary             Patient Acceptance, E,TB, VU by SD at 7/21/2023 6485    Comment: OT POC                         Point: Home exercise program (Not Started)       Description:   Instruct learner(s) on appropriate technique for monitoring, assisting and/or progressing therapeutic exercises/activities.                  Learner Progress:  Not documented in this visit.              Point: Precautions (Not Started)       Description:   Instruct learner(s) on prescribed precautions during self-care and functional transfers.                  Learner Progress:  Not documented in this visit.              Point: Body mechanics (Not Started)       Description:   Instruct learner(s) on proper positioning and spine alignment during self-care, functional mobility activities and/or exercises.                  Learner Progress:  Not documented in this visit.                              User Key       Initials Effective Dates Name Provider Type Discipline    SD 06/16/21 -  Kaleigh Zaragoza OT Occupational Therapist OT                  OT Recommendation and Plan  Planned Therapy Interventions (OT): activity tolerance training, adaptive equipment training, BADL retraining, patient/caregiver education/training, ROM/therapeutic exercise, strengthening exercise, transfer/mobility retraining  Therapy Frequency (OT): 3 times/wk (5 times if indicated)  Plan of Care Review  Plan of Care Reviewed With: patient  Progress: no change  Outcome Evaluation: OT eval completed. Patient sitting EOB, no c/o, is on 5L O2 satting 94%. Patient is able to perform ADLs with CGA-Min A; discussed AE that would be beneficial for patient to conserve energy, including reacher, long handled sponge, long handled peter-aid and a  bath bench. Patient performed sit to stand and functional mobility with CGA and HHA x 52'. Patient satting 88% upon returning to chair, returned to 94% after resting. Encouraged patient to use a walker to aid in breathing and alleviate knee pain. Patient states she has a rollator, but it won't fit through the house; would benefit from a regular RW and HH services at [place]. Is interested in going to pulmonary rehab, and would be beneficial if transportation could be arranged. Patient is expected to benefit from continued OT services prior to [place].     Time Calculation:   Evaluation Complexity (OT)  Review Occupational Profile/Medical/Therapy History Complexity: brief/low complexity  Assessment, Occupational Performance/Identification of Deficit Complexity: 1-3 performance deficits  Clinical Decision Making Complexity (OT): problem focused assessment/low complexity  Overall Complexity of Evaluation (OT): low complexity     Time Calculation- OT       Row Name 07/21/23 1307             Time Calculation- OT    OT Start Time 1043  -SD      OT Received On 07/21/23  -SD      OT Goal Re-Cert Due Date 08/02/23  -SD         Untimed Charges    OT Eval/Re-eval Minutes 45  -SD         Total Minutes    Untimed Charges Total Minutes 45  -SD       Total Minutes 45  -SD                User Key  (r) = Recorded By, (t) = Taken By, (c) = Cosigned By      Initials Name Provider Type    Kaleigh Olvera OT Occupational Therapist                  Therapy Charges for Today       Code Description Service Date Service Provider Modifiers Qty    [id number]  OT EVAL LOW COMPLEXITY 3 7/21/2023 Kaleigh Zaragoza OT GO 1                 Kaleigh Zaragoza OT  7/21/2023

## 2023-07-21 NOTE — PLAN OF CARE
Goal Outcome Evaluation:  Plan of Care Reviewed With: patient        Progress: no change  Outcome Evaluation: Pt particpated in PT eval this date. Pt sitting EOB. Pt on 5L O2 at 94%. Pt able to transfer to standing and ambulate without AD with HHa/ CGA. states she may be able to use a standard Rwx in her home to assist her with ambulation. Pt's O2 sats 88% following 52 ft of gait on 5L. Pt is expected to benefit from skilled PTx during this inpatient stay.      Anticipated Discharge Disposition (PT): home with assist, home with home health

## 2023-07-21 NOTE — CASE MANAGEMENT/SOCIAL WORK
Met with pt to update. Insurance won't authorize a walker since she got a wheelchair last admission. Confirmed with pt Trilogy was ordered by MD and referred to [facility], pt states that provider  is her preferred source.    14:30 [employer] arrived with  delivery, [facility]    Spoke with Jill at office, who states Dr. Oconnor spoke with pt this morning and ordered [employer] device as is more suited for pt needs.     Followed up with pt, stating she still wants Trilogy from [facility], but is agreeable to speak with [facility] and receive instructions on device.

## 2023-07-21 NOTE — PLAN OF CARE
Goal Outcome Evaluation:  Plan of Care Reviewed With: patient        Progress: no change  Outcome Evaluation: OT eval completed. Patient sitting EOB, no c/o, is on 5L O2 satting 94%. Patient is able to perform ADLs with CGA-Min A; discussed AE that would be beneficial for patient to conserve energy, including reacher, long handled sponge, long handled peter-aid and a bath bench. Patient performed sit to stand and functional mobility with CGA and HHA x 52'. Patient satting 88% upon returning to chair, returned to 94% after resting. Encouraged patient to use a walker to aid in breathing and alleviate knee pain. Patient states she has a rollator, but it won't fit through the house; would benefit from a regular RW and HH services at [place]. Is interested in going to pulmonary rehab, and would be beneficial if transportation could be arranged. Patient is expected to benefit from continued OT services prior to [place].      Anticipated Discharge Disposition (OT): home with assist, home with home health, other (see comments) (pulmonary rehab)

## 2023-07-21 NOTE — PLAN OF CARE
Goal Outcome Evaluation:  Plan of Care Reviewed With: patient        Progress: no change  Outcome Evaluation: No acute events overnight. Patient was only able to tollerate Bipap for about 2 hours overnight. VSS. Continue to monitor.

## 2023-07-21 NOTE — CONSULTS
[facility] Consult Note    Referring Provider: Vu  Reason for Consultation: Atrial fibrillation    Patient Care Team:  Troy Umaña MD as PCP - General (Internal Medicine)    Chief complaint : Atrial fibrillation    Subjective:    History of present illness: This is a 73-year-old female patient who is readmitted with shortness of breath.  The patient was recently hospitalized with atrial fibrillation and rapid ventricular response.  She underwent synchronized external cardioversion which was successful.  She was started on antiarrhythmic drug therapy with Eliquis 80 mg orally twice daily.  She presented back to the emergency room with worsening shortness of breath both at rest and with activity.  She reports effort limiting dyspnea with basic activities of daily living.  She has had increasing lower extremity edema and orthopnea.  Chest x-ray showed no evidence of interstitial or alveolar pulmonary edema and no pleural effusions.  She has morbid obesity with both obstructive sleep apnea and Pickwickian syndrome.  proBNP was elevated.  Serial cardiac troponins were normal.  EKG showed atrial fibrillation with a controlled ventricular response and no ischemic ST-T wave changes.  QTc was 503 ms.  She is a former smoker.  She is anticoagulated with Eliquis 5 mg orally twice daily.  She has had no bleeding complications related to anticoagulation therapy.  There have been no signs or symptoms of cardioembolic event.    Review of Systems   Review of Systems   Constitutional: Negative for chills, diaphoresis, fever, malaise/fatigue, weight gain and weight loss.   HENT:  Negative for ear discharge, hearing loss, hoarse voice and nosebleeds.    Eyes:  Negative for discharge, double vision, pain and photophobia.   Cardiovascular:  Positive for dyspnea on exertion, leg swelling and orthopnea. Negative for chest pain, claudication, cyanosis, irregular heartbeat, near-syncope, palpitations, paroxysmal nocturnal  dyspnea and syncope.   Respiratory:  Positive for shortness of breath. Negative for cough, hemoptysis, sputum production and wheezing.    Endocrine: Negative for cold intolerance, heat intolerance, polydipsia, polyphagia and polyuria.   Hematologic/Lymphatic: Negative for adenopathy and bleeding problem. Does not bruise/bleed easily.   Skin:  Negative for color change, flushing, itching and rash.   Musculoskeletal:  Negative for muscle cramps, muscle weakness, myalgias and stiffness.   Gastrointestinal:  Negative for abdominal pain, diarrhea, hematemesis, hematochezia, nausea and vomiting.   Genitourinary:  Negative for dysuria, frequency and nocturia.   Neurological:  Negative for focal weakness, loss of balance, numbness, paresthesias and seizures.   Psychiatric/Behavioral:  Negative for altered mental status, hallucinations and suicidal ideas.    Allergic/Immunologic: Negative for HIV exposure, hives and persistent infections.     History  Past Medical History:   Diagnosis Date    A-fib     COPD (chronic obstructive pulmonary disease)     Hypertension     Impaired functional mobility, balance, gait, and endurance    ,   Past Surgical History:   Procedure Laterality Date    BREAST BIOPSY      VENA CAVA FILTER PLACEMENT     , History reviewed. No pertinent family history.,   Social History     Tobacco Use    Smoking status: Former     Packs/day: 1.00     Years: 25.00     Pack years: 25.00     Types: Cigarettes     Quit date:      Years since quittin.5     Passive exposure: Never    Smokeless tobacco: Never   Vaping Use    Vaping Use: Never used   Substance Use Topics    Alcohol use: Yes     Comment: occassional    Drug use: Never   ,   Medications Prior to Admission   Medication Sig Dispense Refill Last Dose    acetaminophen (TYLENOL) 325 MG tablet Take 2 tablets by mouth Every 6 (Six) Hours As Needed for Mild Pain.   Past Week    albuterol (PROVENTIL) (2.5 MG/3ML) 0.083% nebulizer solution Take 2.5 mg by  nebulization 4 (Four) Times a Day.   7/20/2023    albuterol sulfate  (90 Base) MCG/ACT inhaler Inhale 2 puffs by mouth every 4 to 6 hours as needed for cough 18 g 6 7/20/2023    amLODIPine (NORVASC) 2.5 MG tablet Take 1 tablet by mouth Every Night. 90 tablet 3 7/19/2023    apixaban (ELIQUIS) 5 MG tablet tablet Take 1 tablet by mouth Every 12 (Twelve) Hours for 30 days. Indications: Atrial Fibrillation 60 tablet 4 7/20/2023    cyclobenzaprine (FLEXERIL) 5 MG tablet Take 1 tablet by mouth 3 (Three) Times a Day As Needed for Muscle Spasms. (Patient taking differently: Take 1 tablet by mouth 2 (Two) Times a Day As Needed for Muscle Spasms.) 30 tablet 1 7/19/2023    DULoxetine (Cymbalta) 60 MG capsule Take 1 capsule by mouth 2 (Two) Times a Day. 60 capsule 1 7/20/2023    escitalopram (LEXAPRO) 20 MG tablet Take 1 tablet by mouth Daily.   Past Week    Fluticasone-Umeclidin-Vilant (TRELEGY) 100-62.5-25 MCG/ACT inhaler Inhale 1 puff Daily. Rinse mouth out after use 90 each 3 7/20/2023    ibuprofen (ADVIL,MOTRIN) 200 MG tablet Take 1 tablet by mouth Every 6 (Six) Hours As Needed for Mild Pain.   Past Week    lisinopril (PRINIVIL,ZESTRIL) 10 MG tablet TAKE ONE TABLET BY MOUTH ONCE NIGHTLY 90 tablet 3 7/19/2023    Magnesium Oxide 400 (240 Mg) MG tablet Take 1 tablet by mouth As Needed.   7/19/2023    omeprazole (priLOSEC) 40 MG capsule Take 1 capsule by mouth Every Night. 90 capsule 3 7/20/2023    rOPINIRole (REQUIP) 3 MG tablet Take 1 tablet by mouth Every Night. Take 1 hour before bedtime. 90 tablet 3 7/19/2023    Sotalol HCl AF 80 MG tablet Take 1 tablet by mouth 2 (Two) Times a Day. 60 tablet 11 7/20/2023    furosemide (LASIX) 20 MG tablet TAKE ONE TABLET BY MOUTH DAILY (Patient taking differently: Take 1 tablet by mouth As Needed.) 90 tablet 3     potassium chloride 10 MEQ CR tablet Take 1 tablet by mouth 2 (Two) Times a Day. (Patient taking differently: Take 1 tablet by mouth 2 (Two) Times a Day. Pt and son may  "change per doc) 30 tablet 0 More than a month    pramipexole (MIRAPEX) 1 MG tablet Take 1 tablet by mouth 2 (Two) Times a Day As Needed (pt states she takes 1mg at bedtime and 1mg at 0500).       and Allergies:  Codeine, Sulfa antibiotics, and Tetracyclines & related    Objective:    Vital Sign Min/Max for last 24 hours  Temp  Min: 97.5 °F (36.4 °C)  Max: 98.9 °F (37.2 °C)   BP  Min: 105/64  Max: 149/76   Pulse  Min: 91  Max: 116   Resp  Min: 18  Max: 22   SpO2  Min: 92 %  Max: 100 %   Flow (L/min)  Min: 3  Max: 8   Weight  Min: 116 kg (256 lb 2.8 oz)  Max: 117 kg (258 lb 6.1 oz)     Flowsheet Rows      Flowsheet Row First Filed Value   Admission Height 160 cm (63\") Documented at 07/20/2023 1424   Admission Weight 104 kg (230 lb) Documented at 07/20/2023 1424                   Physical Exam:   Vitals and nursing note reviewed.   Constitutional:       Appearance: Chronically ill-appearing and acutely ill-appearing.   Neck:      Vascular: No JVR. JVD normal.   Pulmonary:      Effort: Pulmonary effort is normal. Tachypnea and accessory muscle usage present.      Breath sounds: Normal breath sounds. Examination of the right-lower field reveals decreased breath sounds. Examination of the left-lower field reveals decreased breath sounds. No wheezing. No rhonchi. No rales.   Chest:      Chest wall: Not tender to palpatation.   Cardiovascular:      PMI at left midclavicular line. Normal rate. Irregularly irregular rhythm. Normal S1. Normal S2.       Murmurs: There is no murmur.      No gallop.  No click. No rub.   Pulses:     Intact distal pulses.   Edema:     Peripheral edema absent.   Abdominal:      General: Bowel sounds are normal.      Palpations: Abdomen is soft.      Tenderness: There is no abdominal tenderness.   Musculoskeletal: Normal range of motion.         General: No tenderness. Skin:     General: Skin is warm and dry.   Neurological:      General: No focal deficit present.      Mental Status: Alert and " "oriented to person, place and time.       Results Review:   I reviewed the patient's new clinical results.  Results from last 7 days   Lab Units 07/20/23  1511   WBC 10*3/mm3 6.79   HEMOGLOBIN g/dL 10.6*   HEMATOCRIT % 36.3   PLATELETS 10*3/mm3 256     Results from last 7 days   Lab Units 07/21/23  0607 07/20/23  1511   SODIUM mmol/L 136 138   POTASSIUM mmol/L 5.0 5.1   CHLORIDE mmol/L 91* 94*   CO2 mmol/L 40.7* 38.0*   BUN mg/dL 21 16   CREATININE mg/dL 0.74 0.66   GLUCOSE mg/dL 161* 146*   CALCIUM mg/dL 8.8 9.1     Lab Results   Lab Value Date/Time    TROPONINT 6 06/12/2023 1235    TROPONINT 7 06/05/2023 2057    TROPONINT <6 03/21/2023 1942    TROPONINT <6 03/21/2023 1716    TROPONINT <6 03/21/2023 1716             Assessment/Plan:      Acute on chronic cor pulmonale with advanced right heart failure due to COPD, morbid obesity, obstructive sleep apnea and alveolar hypoventilation syndrome.  Given the extreme nature of her central /truncal obesity pattern, I am sure that there is an element of a restrictive ventilatory defect as well as impairment of diaphragm movement.  The patient weighs almost 260 pounds and is only 5 foot 2 inches tall.  She has an extremely large abdominal pannus.    Acute on chronic respiratory failure with hypoxia and hypercapnia    Recurrent paroxysmal atrial fibrillation.  Failure of antiarrhythmic drug therapy with sotalol therapy.  QT prolongation with sotalol therapy without evidence of proarrhythmia.      I have recommended discontinuation of sotalol therapy.  I have recommended initiating the amiodarone bolus and drip protocol.  If she does not pharmacologically cardiovert over the next 72-96 hours, I would recommend another synchronized external cardioversion.  I would exercise caution with aggressive high-dose parenteral loop diuretics.  Her echocardiogram and clinical examination suggest that the bulk of her \"congestive heart failure\" is due to chronic right heart failure with " "chronic cor pulmonale.  In the context of a greatly elevated proBNP and peripheral edema, this is often confused for left ventricular systolic heart failure.  Aggressive diuresis will invariably lead to hypotension, prerenal azotemia, electrolyte imbalance, etc.  These patients tend to be exquisitely \"preload dependent\" to maintain cardiac output.    I discussed the patient's findings and my recommendations with patient    Rio Best MD  07/21/23  14:35 EDT        "

## 2023-07-21 NOTE — THERAPY EVALUATION
Patient Name: Claudia Stokes  : 1950    MRN: [id number]                              Today's Date: 2023       Admit Date: 2023    Visit Dx:     ICD-10-CM ICD-9-CM   1. Impaired mobility and ADLs  Z74.09 V49.89    Z78.9    2. Chronic respiratory failure  J96.10 518.83   3. Chronic obstructive pulmonary disease (COPD)  J44.9 496     Patient Active Problem List   Diagnosis   • Impaired mobility and ADLs   • Other emphysema   • RLS (restless legs syndrome)   • Primary hypertension   • Current moderate episode of major depressive disorder without prior episode   • Arthritis pain   • Gastroesophageal reflux disease with esophagitis without hemorrhage   • Interstitial lung disease   • History of tobacco use, presenting hazards to health   • Confusion   • Paroxysmal atrial fibrillation   • Depression with anxiety   • Morbid obesity   • New onset of congestive heart failure   • Acute on chronic respiratory failure with hypoxia and hypercapnia     Past Medical History:   Diagnosis Date   • A-fib    • COPD (chronic obstructive pulmonary disease)    • Hypertension    • Impaired functional mobility, balance, gait, and endurance      Past Surgical History:   Procedure Laterality Date   • BREAST BIOPSY     • VENA CAVA FILTER PLACEMENT        General Information     Row Name 23 1341          Physical Therapy Time and Intention    Document Type evaluation  -MS     Mode of Treatment physical therapy  -MS     Row Name 23 1341          General Information    Patient Profile Reviewed yes  -MS     Prior Level of Function independent:;all household mobility  -MS     Existing Precautions/Restrictions fall;oxygen therapy device and L/min  -MS     Barriers to Rehab medically complex;previous functional deficit  -MS     Row Name 23 1341          Living Environment    People in Home child(yaa), adult  -MS     Row Name 23 1341          Home Main Entrance    Number of Stairs, Main Entrance one  -MS      Stair Railings, Main Entrance none  -MS     Row Name 07/21/23 1341          Stairs Within Home, Primary    Stairs, Within Home, Primary bsement but Pt does not go downstairs  -MS     Row Name 07/21/23 1341          Cognition    Orientation Status (Cognition) oriented x 4  -MS     Row Name 07/21/23 1341          Safety Issues, Functional Mobility    Safety Issues Affecting Function (Mobility) safety precautions follow-through/compliance;safety precaution awareness  -MS     Impairments Affecting Function (Mobility) balance;endurance/activity tolerance;shortness of breath;strength  -MS           User Key  (r) = Recorded By, (t) = Taken By, (c) = Cosigned By    Initials Name Provider Type    Abiodun Bhat PT Physical Therapist               Mobility     Row Name 07/21/23 1342          Sit-Stand Transfer    Sit-Stand [place] (Transfers) contact guard  -MS     Assistive Device (Sit-Stand Transfers) other (see comments)  HHA and gait belt  -MS     Row Name 07/21/23 1342          Gait/Stairs (Locomotion)    [place] Level (Gait) contact guard  -MS     Assistive Device (Gait) other (see comments)  gait belt, HHA  -MS     Deviations/Abnormal Patterns (Gait) alla decreased;festinating/shuffling  -MS           User Key  (r) = Recorded By, (t) = Taken By, (c) = Cosigned By    Initials Name Provider Type    Abiodun Bhat, PT Physical Therapist               Obj/Interventions     Row Name 07/21/23 1343          Range of Motion Comprehensive    General Range of Motion bilateral lower extremity ROM WFL  -MS     Row Name 07/21/23 1343          Strength Comprehensive (MMT)    General Manual Muscle Testing (MMT) Assessment lower extremity strength deficits identified  -MS     Comment, General Manual Muscle Testing (MMT) Assessment B LE 4-/5  -MS     Row Name 07/21/23 1343          Sensory Assessment (Somatosensory)    Sensory Assessment (Somatosensory) sensation intact  -MS           User Key  (r) = Recorded  By, (t) = Taken By, (c) = Cosigned By    Initials Name Provider Type    Abiodun Bhat, PT Physical Therapist               Goals/Plan     Row Name 07/21/23 1344          Gait Training Goal 1 (PT)    Activity/Assistive Device (Gait Training Goal 1, PT) gait (walking locomotion);assistive device use  -MS     [place] Level (Gait Training Goal 1, PT) modified independence  -MS     Distance (Gait Training Goal 1, PT) 100ft  -MS     Time Frame (Gait Training Goal 1, PT) long term goal (LTG);2 weeks  -MS     Row Name 07/21/23 1348          Patient Education Goal (PT)    Activity (Patient Education Goal, PT) ther exer x15 reps ea  -MS     [place]/Cues/Accuracy (Memory Goal 2, PT) demonstrates adequately  -MS     Time Frame (Patient Education Goal, PT) long term goal (LTG);2 weeks  -MS     Row Name 07/21/23 1348          Therapy Assessment/Plan (PT)    Planned Therapy Interventions (PT) balance training;bed mobility training;gait training;home exercise program;transfer training;ROM (range of motion);stair training;strengthening;patient/family education  -MS           User Key  (r) = Recorded By, (t) = Taken By, (c) = Cosigned By    Initials Name Provider Type    Abiodun Bhat PT Physical Therapist               Clinical Impression     Row Name 07/21/23 4254          Pain    Pretreatment Pain Rating 0/10 - no pain  -MS     Posttreatment Pain Rating 0/10 - no pain  -MS     Pain Intervention(s) Repositioned;Ambulation/increased activity  -MS     Row Name 07/21/23 134          Plan of Care Review    Plan of Care Reviewed With patient  -MS     Progress no change  -MS     Outcome Evaluation Pt particpated in PT eval this date. Pt sitting EOB. Pt on 5L O2 at 94%. Pt able to transfer to standing and ambulate without AD with HHa/ CGA. states she may be able to use a standard Rwx in her home to assist her with ambulation. Pt's O2 sats 88% following 52 ft of gait on 5L. Pt is expected to benefit from skilled PTx  during this inpatient stay.  -MS     Row Name 07/21/23 1344          Therapy Assessment/Plan (PT)    Patient/Family Therapy Goals Statement (PT) to go home  -MS     Rehab Potential (PT) good, to achieve stated therapy goals  -MS     Criteria for Skilled Interventions Met (PT) yes;meets criteria  -MS     Therapy Frequency (PT) 5 times/wk  -MS     Row Name 07/21/23 1344          Vital Signs    Pre SpO2 (%) 94  -MS     O2 Delivery Pre Treatment supplemental O2  -MS     Intra SpO2 (%) 88  -MS     O2 Delivery Intra Treatment supplemental O2  -MS     Post SpO2 (%) 94  -MS     O2 Delivery Post Treatment supplemental O2  -MS     Pre Patient Position Sitting  -MS     Intra Patient Position Standing  -MS     Post Patient Position Sitting  -MS     Row Name 07/21/23 1344          Positioning and Restraints    Pre-Treatment Position in bed  -MS     Post Treatment Position chair  -MS     In Chair sitting;call light within reach;encouraged to call for assist  -MS           User Key  (r) = Recorded By, (t) = Taken By, (c) = Cosigned By    Initials Name Provider Type    Abiodun Bhat, PT Physical Therapist               Outcome Measures     Row Name 07/21/23 1349 07/21/23 0818       How much help from another person do you currently need...    Turning from your back to your side while in flat bed without using bedrails? 4  -MS 4  -KJ    Moving from lying on back to sitting on the side of a flat bed without bedrails? 4  -MS 4  -KJ    Moving to and from a bed to a chair (including a wheelchair)? 3  -MS 3  -KJ    Standing up from a chair using your arms (e.g., wheelchair, bedside chair)? 3  -MS 3  -KJ    Climbing 3-5 steps with a railing? 3  -MS 2  -KJ    To walk in hospital room? 3  -MS 2  -KJ    AM-PAC 6 Clicks Score (PT) 20  -MS 18  -KJ    Highest level of mobility 6 --> Walked 10 steps or more  -MS 6 --> Walked 10 steps or more  -KJ    Row Name 07/21/23 1306          Functional Assessment    Outcome Measure Options AM-PAC 6  Clicks Daily Activity (OT)  -SD           User Key  (r) = Recorded By, (t) = Taken By, (c) = Cosigned By    Initials Name Provider Type    Kaleigh Olvera, OT Occupational Therapist    MS Gonzalo, Abiodun, PT Physical Therapist    Anamaria Kirk, RN Registered Nurse                             Physical Therapy Education     Title: PT OT SLP Therapies (In Progress)     Topic: Physical Therapy (In Progress)     Point: Mobility training (Done)     Learning Progress Summary           Patient Acceptance, E, VU by MS at 7/21/2023 1349    Comment: importance of mobility                   Point: Home exercise program (Done)     Learning Progress Summary           Patient Acceptance, E, VU by MS at 7/21/2023 1349    Comment: importance of mobility                   Point: Body mechanics (Not Started)     Learner Progress:  Not documented in this visit.          Point: Precautions (Not Started)     Learner Progress:  Not documented in this visit.                      User Key     Initials Effective Dates Name Provider Type Discipline    MS 07/01/22 -  Abiodun Sánchez, PT Physical Therapist PT              PT Recommendation and Plan  Planned Therapy Interventions (PT): balance training, bed mobility training, gait training, home exercise program, transfer training, ROM (range of motion), stair training, strengthening, patient/family education  Plan of Care Reviewed With: patient  Progress: no change  Outcome Evaluation: Pt particpated in PT eval this date. Pt sitting EOB. Pt on 5L O2 at 94%. Pt able to transfer to standing and ambulate without AD with HHa/ CGA. states she may be able to use a standard Rwx in her home to assist her with ambulation. Pt's O2 sats 88% following 52 ft of gait on 5L. Pt is expected to benefit from skilled PTx during this inpatient stay.     Time Calculation:   PT Evaluation Complexity  History, PT Evaluation Complexity: 1-2 personal factors and/or comorbidities  Examination of Body Systems  (PT Eval Complexity): total of 3 or more elements  Clinical Presentation (PT Evaluation Complexity): evolving  Clinical Decision Making (PT Evaluation Complexity): moderate complexity  Overall Complexity (PT Evaluation Complexity): moderate complexity     PT Charges     Row Name 07/21/23 1350             Time Calculation    Start Time 1040  -MS      PT Received On 07/21/23  -MS      PT Goal Re-Cert Due Date 07/31/23  -MS         Untimed Charges    PT Eval/Re-eval Minutes 54  -MS         Total Minutes    Untimed Charges Total Minutes 54  -MS       Total Minutes 54  -MS            User Key  (r) = Recorded By, (t) = Taken By, (c) = Cosigned By    Initials Name Provider Type    MS Abiodun Sánchez, PT Physical Therapist              Therapy Charges for Today     Code Description Service Date Service Provider Modifiers Qty    [id number] HC PT EVAL MOD COMPLEXITY 4 7/21/2023 Abiodun Sánchez, PT GP 1          PT G-Codes  Outcome Measure Options: AM-PAC 6 Clicks Daily Activity (OT)  AM-PAC 6 Clicks Score (PT): 20  AM-PAC 6 Clicks Score (OT): 20  PT Discharge Summary  Anticipated Discharge Disposition (PT): home with assist, home with home health    Abiodun Sánchez PT  7/21/2023

## 2023-07-21 NOTE — PAYOR COMM NOTE
"To:  Marley  From: Kimberly Blas RN  Phone: [phone]  Fax: [phone]  NPI: [id number]  TIN: [id number]  Member ID: [id number]   MRN: [id number]    Deisi Alvarez (73 y.o. Female)       Date of Birth   1950    Social Security Number       Address   [address] [place], [place] [address]    Home Phone   [phone]    MRN   [id number]       Church   None    Marital Status   Single                            Admission Date   7/20/23    Admission Type   Emergency    Admitting Provider       Attending Provider   Lorraine Womack DO    Department, Room/Bed   [facility] TELEMETRY 4, 423/1       Discharge Date       Discharge Disposition       Discharge Destination                                 Attending Provider: Lorraine Womack DO    Allergies: Codeine, Sulfa Antibiotics, Tetracyclines & Related    Isolation: None   Infection: COVID (rule out) (07/20/23)   Code Status: CPR    Ht: 160 cm (62.99\")   Wt: 117 kg (258 lb 6.1 oz)    Admission Cmt: None   Principal Problem: New onset of congestive heart failure [I50.9]                   Active Insurance as of 7/20/2023       Primary Coverage       Payor Plan Insurance Group Employer/Plan Group    ANTHEM MEDICARE REPLACEMENT ANTHEM MEDICARE ADVANTAGE [id number]       Payor Plan Address Payor Plan Phone Number Payor Plan Fax Number Effective Dates    [address] [phone]  11/1/2022 - None Entered    [facility] [address]         Subscriber Name Subscriber Birth Date Member ID       DEISI ALVAREZ 1950 [id number]               Secondary Coverage       Payor Plan Insurance Group Employer/Plan Group    ANTHEM MEDICAID ANTHEM MEDICAID [id number]       Payor Plan Address Payor Plan Phone Number Payor Plan Fax Number Effective Dates    [address] [address] [phone]  11/1/2022 - None Entered    [facility] [address]         Subscriber Name Subscriber Birth Date Member ID       DEIIS ALVAREZ 1950 [id number]          "            Emergency Contacts        (Rel.) Home Phone Work Phone Mobile Phone    DES DUVAL (Daughter) [phone] -- --    Rodrigo Chong (Son) -- -- [phone]    DEE ARVIZU (Sister) [phone] -- --                 History & Physical        Lorraine Womack, DO at [phone]            [facility]   [facility]      Name:  Claudia Stokes   Age:  73 y.o.  Sex:  female  :  1950  MRN:  [id number]   Visit Number:  [id number]  Admission Date:  2023  Date Of Service:  23  Primary Care Physician:  Troy Umaña MD    Chief Complaint:     Shortness of breath, swelling    History Of Presenting Illness:      The patient is a 73-year-old with a history of chronic respiratory failure with hypoxia and hypercapnia, COPD, suspected diastolic congestive heart failure, atrial fibrillation on sotalol and Eliquis, prior tobacco abuse, obesity, who presented from home due to 3 to 4-day complaint of increasing shortness of breath.  Patient notes no significant cough but feels congested.  No chest pains or palpitations.  Has noticed increased swelling to her lower extremities.  She denies any fevers or chills.  She has been using her home Trelegy inhaler and nebulizer treatments.  She was actually seen recently and ordered a BiPAP which has yet to be set up.  She has not smoked in many years.  She follows with pulmonology in [place] and Dr. Best as her cardiologist.  She is chronically on sotalol and Eliquis.  She remains out of rhythm and is due to potentially have a cardioversion done.    In the ER, the patient was hypoxic on 3 L and was requiring up to 5 L to maintain sats greater than 90%.  She was initially ordered BiPAP.  EKG did show atrial fibrillation but otherwise no acute ST elevations.  She did have increased BNP.  Her kidney function appears stable and at baseline.  ABG was demonstrating a pH of 7.311 and a PCO2 of 77.9.  Due to concern  for possible COPD exacerbation and questionable new onset heart failure we were asked admit the patient.    Review Of Systems:    All systems were reviewed and negative except as mentioned in history of presenting illness, assessment and plan.    Past Medical History: Patient  has a past medical history of A-fib, COPD (chronic obstructive pulmonary disease), Hypertension, and Impaired functional mobility, balance, gait, and endurance.    Past Surgical History: Patient  has a past surgical history that includes Vena cava filter placement and Breast biopsy.    Social History: Patient  reports that she quit smoking about 5 years ago. Her smoking use included cigarettes. She has a 25.00 pack-year smoking history. She has never been exposed to tobacco smoke. She has never used smokeless tobacco. She reports current alcohol use. She reports that she does not use drugs.    Family History:  Patient's family history has been reviewed and found to be noncontributory.     Allergies:      Codeine, Sulfa antibiotics, and Tetracyclines & related    Home Medications:    Prior to Admission Medications       Prescriptions Last Dose Informant Patient Reported? Taking?    albuterol (PROVENTIL) (2.5 MG/3ML) 0.083% nebulizer solution 7/20/2023  Yes Yes    Take 2.5 mg by nebulization 4 (Four) Times a Day.    albuterol sulfate  (90 Base) MCG/ACT inhaler 7/20/2023  No Yes    Inhale 2 puffs by mouth every 4 to 6 hours as needed for cough    amLODIPine (NORVASC) 2.5 MG tablet 7/19/2023  No Yes    Take 1 tablet by mouth Every Night.    apixaban (ELIQUIS) 5 MG tablet tablet 7/20/2023  No Yes    Take 1 tablet by mouth Every 12 (Twelve) Hours for 30 days. Indications: Atrial Fibrillation    cyclobenzaprine (FLEXERIL) 5 MG tablet 7/19/2023  No Yes    Take 1 tablet by mouth 3 (Three) Times a Day As Needed for Muscle Spasms.    DULoxetine (Cymbalta) 60 MG capsule 7/20/2023  No Yes    Take 1 capsule by mouth 2 (Two) Times a Day.     "Fluticasone-Umeclidin-Vilant (TRELEGY) 100-62.5-25 MCG/ACT inhaler 7/20/2023  No Yes    Inhale 1 puff Daily. Rinse mouth out after use    lisinopril (PRINIVIL,ZESTRIL) 10 MG tablet 7/19/2023  No Yes    TAKE ONE TABLET BY MOUTH ONCE NIGHTLY    Magnesium Oxide 400 (240 Mg) MG tablet 7/19/2023 Self Yes Yes    Take 1 tablet by mouth As Needed.    omeprazole (priLOSEC) 40 MG capsule 7/20/2023  No Yes    Take 1 capsule by mouth Every Night.    rOPINIRole (REQUIP) 3 MG tablet 7/19/2023  No Yes    Take 1 tablet by mouth Every Night. Take 1 hour before bedtime.    Sotalol HCl AF 80 MG tablet 7/20/2023  No Yes    Take 1 tablet by mouth 2 (Two) Times a Day.    furosemide (LASIX) 20 MG tablet  Self No No    TAKE ONE TABLET BY MOUTH DAILY    Patient taking differently:  Take 1 tablet by mouth As Needed.    potassium chloride 10 MEQ CR tablet More than a month  No No    Take 1 tablet by mouth 2 (Two) Times a Day.    pramipexole (MIRAPEX) 1 MG tablet   Yes No    Take 1 tablet by mouth 2 (Two) Times a Day As Needed (pt states she takes 1mg at bedtime and 1mg at 0500).          ED Medications:    Medications   sodium chloride 0.9 % flush 10 mL (has no administration in time range)   ipratropium-albuterol (DUO-NEB) nebulizer solution 3 mL (3 mL Nebulization Given 7/20/23 1453)   methylPREDNISolone sodium succinate (SOLU-Medrol) injection 125 mg (125 mg Intravenous Given 7/20/23 1512)   furosemide (LASIX) injection 80 mg (80 mg Intravenous Given 7/20/23 1625)     Vital Signs:  Temp:  [97.8 °F (36.6 °C)] 97.8 °F (36.6 °C)  Heart Rate:  [103-112] 112  Resp:  [20-26] 20  BP: (105-148)/() 148/100        07/20/23  1424 07/20/23  1700   Weight: 104 kg (230 lb) 116 kg (256 lb 2.8 oz)     Body mass index is 45.39 kg/m².    Physical Exam:     Most recent vital Signs: /100 (BP Location: Left arm, Patient Position: Lying)   Pulse 112   Temp 97.8 °F (36.6 °C) (Oral)   Resp 20   Ht 160 cm (62.99\")   Wt 116 kg (256 lb 2.8 oz)   " SpO2 92%   BMI 45.39 kg/m²     Physical Exam  Constitutional:       General: She is not in acute distress.     Appearance: She is obese. She is not toxic-appearing.   HENT:      Head: Normocephalic and atraumatic.      Mouth/Throat:      Mouth: Mucous membranes are moist.      Pharynx: Oropharynx is clear.   Eyes:      Extraocular Movements: Extraocular movements intact.      Pupils: Pupils are equal, round, and reactive to light.   Cardiovascular:      Rate and Rhythm: Tachycardia present. Rhythm irregular.      Pulses: Normal pulses.      Heart sounds: No murmur heard.    No gallop.   Pulmonary:      Breath sounds: Wheezing and rales present.   Abdominal:      General: Bowel sounds are normal. There is no distension.      Tenderness: There is no abdominal tenderness. There is no guarding.   Musculoskeletal:      Right lower leg: Edema present.      Left lower leg: Edema present.   Skin:     General: Skin is warm.      Capillary Refill: Capillary refill takes less than 2 seconds.      Findings: No bruising or lesion.   Neurological:      General: No focal deficit present.      Mental Status: She is alert. Mental status is at baseline.      Motor: Weakness present.      Gait: Gait normal.   Psychiatric:         Mood and Affect: Mood normal.         Thought Content: Thought content normal.       Laboratory data:    I have reviewed the labs done in the emergency room.    Results from last 7 days   Lab Units 07/20/23  1511   SODIUM mmol/L 138   POTASSIUM mmol/L 5.1   CHLORIDE mmol/L 94*   CO2 mmol/L 38.0*   BUN mg/dL 16   CREATININE mg/dL 0.66   CALCIUM mg/dL 9.1   BILIRUBIN mg/dL 0.6   ALK PHOS U/L 137*   ALT (SGPT) U/L 22   AST (SGOT) U/L 22   GLUCOSE mg/dL 146*     Results from last 7 days   Lab Units 07/20/23  1511   WBC 10*3/mm3 6.79   HEMOGLOBIN g/dL 10.6*   HEMATOCRIT % 36.3   PLATELETS 10*3/mm3 256             Results from last 7 days   Lab Units 07/20/23  1511   PROBNP pg/mL 1,788.0*             Results from  last 7 days   Lab Units 07/20/23  1500   PH, ARTERIAL pH units 7.311*   PO2 ART mm Hg 89.8   PCO2, ARTERIAL mm Hg 77.9*   HCO3 ART mmol/L 39.3*           Invalid input(s): USDES,  BLOODU, NITRITITE, BACT, EP    Pain Management Panel           No data to display                EKG:      Appears to be atrial fibrillation, see no acute ST elevations or T wave abnormalities when compared to prior EKG    Radiology:    XR Chest 1 View    Result Date: 7/20/2023  PROCEDURE: XR CHEST 1 VW-    HISTORY: soa  COMPARISON: June 12, 2023.  FINDINGS: The heart is enlarged, but stable. Pulmonary vascular congestion is stable. There is mild interstitial disease bilaterally which is stable.  There is no pneumothorax. There are no acute osseous abnormalities.      No significant interval change.        Images were reviewed, interpreted, and dictated by Dr. Helena Salcido MD Transcribed by Charo Marshall PA-C.  This report was signed and finalized on 7/20/2023 3:45 PM by Helena Salcido MD.     Assessment:    COPD with acute exacerbation, POA  Acute on chronic hypoxic/hypercapnic respiratory failure, POA  Possible new onset diastolic congestive heart failure versus tachycardia induced cardiomyopathy, POA  Chronic venous insufficiency  Hypertension    Plan:    Admit as inpatient    Respiratory failure/COPD/CHF:  Symptomatology is concerning for ongoing COPD exacerbation and will treat with bronchodilators and steroids.  Will place consult for pulmonology to see inpatient due to her degree of hypercapnic respiratory failure.  I suspect she needs NIPPV/trilogy at home, will likely need to arrange this for discharge if possible.    In regards to concern for heart failure, she does have some degree of peripheral edema and BNP is elevated.  EF from March was stable with no significant cor pulmonale noted.  She has known atrial fibrillation and is currently in A-fib.  Perhaps having some tachycardia induced cardiomyopathy.  Was recently started  on sotalol about 10 days ago.  Notes was due to see Dr. Best to consider cardioversion after 4 weeks of sotalol therapy.  We will ask Dr. Best to see in consultation tomorrow.    A-fib:  Remains in atrial fibrillation with rate controlled appears.  She is on sotalol currently.  Due to have potential cardioversion performed with Dr. Best after 1 month of sotalol therapy.  We will ask him to see during hospital stay.    The patient otherwise meets inpatient level care and anticipate greater than 2 midnight stay.  Further recommendation depend upon clinical course.  Plan of care discussed with the patient at bedside.    Risk Assessment: High  DVT Prophylaxis: Eliquis  Code Status: Full  Diet: Cardiac/fluid restriction    Advance Care Planning   ACP discussion was held with the patient during this visit. Patient does not have an advance directive, declines further assistance.           Lorraine Womack DO  07/20/23  17:56 EDT    Dictated utilizing Dragon dictation.    Electronically signed by Lorraine Womack DO at 07/20/23 1804       Vital Signs (last day)       Date/Time Temp Temp src Pulse Resp BP Patient Position SpO2    07/21/23 0833 -- -- 109 -- -- -- --    07/21/23 0708 -- -- -- -- -- -- 99    07/21/23 0701 98.6 (37) Oral 97 20 134/67 Sitting 97    07/21/23 0306 97.8 (36.6) Oral 91 20 105/64 Lying 100    07/20/23 2306 97.6 (36.4) Oral 94 20 114/61 Lying 99    07/20/23 1904 98.9 (37.2) Oral 116 20 149/76 Sitting 98    07/20/23 1700 97.8 (36.6) Oral 112 20 148/100 Lying 92    07/20/23 1453 -- -- -- 22 -- -- --    07/20/23 1424 97.8 (36.6) Oral 103 26 105/47 Sitting 87          Current Facility-Administered Medications   Medication Dose Route Frequency Provider Last Rate Last Admin    acetaminophen (TYLENOL) tablet 650 mg  650 mg Oral Q4H PRN Lorraine Womack DO        Or    acetaminophen (TYLENOL) 160 MG/5ML solution 650 mg  650 mg Oral Q4H PRN Lorraine Womack DO        Or     acetaminophen (TYLENOL) suppository 650 mg  650 mg Rectal Q4H PRN Lorraine Womack DO        apixaban (ELIQUIS) tablet 5 mg  5 mg Oral Q12H Lorraine Womack DO   5 mg at 07/21/23 0833    benzonatate (TESSALON) capsule 200 mg  200 mg Oral TID PRN Lorraine Womack DO        sennosides-docusate (PERICOLACE) 8.6-50 MG per tablet 2 tablet  2 tablet Oral BID Lorraine Womack DO        And    polyethylene glycol (MIRALAX) packet 17 g  17 g Oral Daily PRN Lorraine Womack DO        And    bisacodyl (DULCOLAX) EC tablet 5 mg  5 mg Oral Daily PRN Lorraine Womack DO        And    bisacodyl (DULCOLAX) suppository 10 mg  10 mg Rectal Daily PRN Lorraine Womack DO        budesonide (PULMICORT) nebulizer solution 0.5 mg  0.5 mg Nebulization BID - RT Lorraine Womack DO   0.5 mg at 07/21/23 0708    calcium carbonate (TUMS) chewable tablet 500 mg (200 mg elemental)  1 tablet Oral BID PRN Lorranie Womack DO        Calcium Replacement - Follow Nurse / BPA Driven Protocol   Does not apply PRN Lorraine Womack DO        cyclobenzaprine (FLEXERIL) tablet 5 mg  5 mg Oral TID PRN Lorraine Womack DO   5 mg at 07/20/23 2044    DULoxetine (CYMBALTA) DR capsule 60 mg  60 mg Oral BID Lorraine Womack DO   60 mg at 07/21/23 0833    ipratropium-albuterol (DUO-NEB) nebulizer solution 3 mL  3 mL Nebulization Q6H - RT Lorraine Womack DO   3 mL at 07/21/23 0708    lisinopril (PRINIVIL,ZESTRIL) tablet 10 mg  10 mg Oral Nightly Lorraine Womack DO   10 mg at 07/20/23 2040    Magnesium Oxide -Mg Supplement tablet 400 mg  400 mg Oral Daily Lorraine Womack DO        Magnesium Standard Dose Replacement - Follow Nurse / BPA Driven Protocol   Does not apply PRN Lorraine Womack, DO        nitroglycerin (NITROSTAT) SL tablet 0.4 mg  0.4 mg Sublingual Q5 Min PRN Lorraine Womack, DO        ondansetron (ZOFRAN) tablet 4 mg  4 mg Oral Q6H PRN  Lorraine Womack, DO        Or    ondansetron (ZOFRAN) injection 4 mg  4 mg Intravenous Q6H PRN Lorraine Womack, DO        pantoprazole (PROTONIX) EC tablet 40 mg  40 mg Oral Nightly Lorraine Womack, DO   40 mg at 07/20/23 2040    Phosphorus Replacement - Follow Nurse / BPA Driven Protocol   Does not apply PRN Lorraine Womack, DO        Potassium Replacement - Follow Nurse / BPA Driven Protocol   Does not apply PRN Lorraine Womack, DO        pramipexole (MIRAPEX) tablet 1 mg  1 mg Oral BID PRN Lorraine Womack, DO   1 mg at 07/20/23 2043    predniSONE (DELTASONE) tablet 40 mg  40 mg Oral Daily With Breakfast Lorraine Womack, DO   40 mg at 07/21/23 0833    rOPINIRole (REQUIP) tablet 3 mg  3 mg Oral Nightly Lorraine Womack, DO   3 mg at 07/20/23 2046    sodium chloride 0.9 % flush 10 mL  10 mL Intravenous PRN Lorraine Womack, DO        sodium chloride 0.9 % flush 10 mL  10 mL Intravenous Q12H Lorraine Womack, DO   10 mL at 07/20/23 2044    sodium chloride 0.9 % flush 10 mL  10 mL Intravenous PRN Lorraine Womack,         sodium chloride 0.9 % infusion 40 mL  40 mL Intravenous PRN Lorraine Womack, DO        sotalol (BETAPACE) tablet 80 mg  80 mg Oral Q12H Lorraine Womack, DO   80 mg at 07/21/23 0833    temazepam (RESTORIL) capsule 15 mg  15 mg Oral Nightly PRN Lorraine Womack, DO   15 mg at 07/20/23 2043     Lab Results (last 24 hours)       Procedure Component Value Units Date/Time    Basic Metabolic Panel [[id number]]  (Abnormal) Collected: 07/21/23 0607    Specimen: Blood Updated: 07/21/23 0657     Glucose 161 mg/dL      BUN 21 mg/dL      Creatinine 0.74 mg/dL      Sodium 136 mmol/L      Potassium 5.0 mmol/L      Chloride 91 mmol/L      CO2 40.7 mmol/L      Calcium 8.8 mg/dL      BUN/Creatinine Ratio 28.4     Anion Gap 4.3 mmol/L      eGFR 86.1 mL/min/1.73     Narrative:      GFR Normal >60  Chronic Kidney Disease  <60  Kidney Failure <15    The GFR formula is only valid for adults with stable renal function between ages 18 and 70.    BNP [[id number]]  (Abnormal) Collected: 07/20/23 1511    Specimen: Blood Updated: 07/20/23 1600     proBNP 1,788.0 pg/mL     Narrative:      Among patients with dyspnea, NT-proBNP is highly sensitive for the detection of acute congestive heart failure. In addition NT-proBNP of <300 pg/ml effectively rules out acute congestive heart failure with 99% negative predictive value.    Results may be falsely decreased if patient taking Biotin.      CBC & Differential [[id number]]  (Abnormal) Collected: 07/20/23 1511    Specimen: Blood Updated: 07/20/23 1537    Narrative:      The following orders were created for panel order CBC & Differential.  Procedure                               Abnormality         Status                     ---------                               -----------         ------                     CBC Auto Differential[[id number]]        Abnormal            Final result               Scan Slide[[id number]]                                       Final result                 Please view results for these tests on the individual orders.    Scan Slide [[id number]] Collected: 07/20/23 1511    Specimen: Blood Updated: 07/20/23 1537     Anisocytosis Slight/1+     Hypochromia Slight/1+     WBC Morphology Normal     Platelet Estimate Adequate    Comprehensive Metabolic Panel [[id number]]  (Abnormal) Collected: 07/20/23 1511    Specimen: Blood Updated: 07/20/23 1534     Glucose 146 mg/dL      BUN 16 mg/dL      Creatinine 0.66 mg/dL      Sodium 138 mmol/L      Potassium 5.1 mmol/L      Chloride 94 mmol/L      CO2 38.0 mmol/L      Calcium 9.1 mg/dL      Total Protein 6.8 g/dL      Albumin 3.9 g/dL      ALT (SGPT) 22 U/L      AST (SGOT) 22 U/L      Alkaline Phosphatase 137 U/L      Total Bilirubin 0.6 mg/dL      Globulin 2.9 gm/dL      A/G Ratio 1.3 g/dL      BUN/Creatinine Ratio 24.2     Anion Gap 6.0  mmol/L      eGFR 93.3 mL/min/1.73     Narrative:      GFR Normal >60  Chronic Kidney Disease <60  Kidney Failure <15    The GFR formula is only valid for adults with stable renal function between ages 18 and 70.    CBC Auto Differential [[id number]]  (Abnormal) Collected: 07/20/23 1511    Specimen: Blood Updated: 07/20/23 1518     WBC 6.79 10*3/mm3      RBC 4.11 10*6/mm3      Hemoglobin 10.6 g/dL      Hematocrit 36.3 %      MCV 88.3 fL      MCH 25.8 pg      MCHC 29.2 g/dL      RDW 15.6 %      RDW-SD 50.3 fl      MPV 9.7 fL      Platelets 256 10*3/mm3      Neutrophil % 80.9 %      Lymphocyte % 10.6 %      Monocyte % 6.2 %      Eosinophil % 1.6 %      Basophil % 0.3 %      Immature Grans % 0.4 %      Neutrophils, Absolute 5.49 10*3/mm3      Lymphocytes, Absolute 0.72 10*3/mm3      Monocytes, Absolute 0.42 10*3/mm3      Eosinophils, Absolute 0.11 10*3/mm3      Basophils, Absolute 0.02 10*3/mm3      Immature Grans, Absolute 0.03 10*3/mm3      nRBC 0.0 /100 WBC     COVID-19 and FLU A/B PCR - Swab, Nasopharynx [[id number]]  (Normal) Collected: 07/20/23 1449    Specimen: Swab from Nasopharynx Updated: 07/20/23 1513     COVID19 Not Detected     Influenza A PCR Not Detected     Influenza B PCR Not Detected    Narrative:      Fact sheet for providers: https://www.fda.gov/media/969557/download    Fact sheet for patients: https://www.fda.gov/media/432768/download    Test performed by PCR.    Blood Gas, Arterial With Co-Ox [[id number]]  (Abnormal) Collected: 07/20/23 1500    Specimen: Arterial Blood Updated: 07/20/23 1500     Site Left Radial     Reddy's Test Positive     pH, Arterial 7.311 pH units      Comment: 84 Value below reference range        pCO2, Arterial 77.9 mm Hg      Comment: 86 Value above critical limit        pO2, Arterial 89.8 mm Hg      HCO3, Arterial 39.3 mmol/L      Comment: 83 Value above reference range        Base Excess, Arterial 10.5 mmol/L      Comment: 83 Value above reference range        O2 Saturation,  Arterial 97.2 %      Hematocrit, Blood Gas 34.1 %      Comment: 84 Value below reference range        Oxyhemoglobin 94.9 %      Methemoglobin 0.50 %      Carboxyhemoglobin 1.9 %      A-a DO2 43.2 mmHg      Barometric Pressure for Blood Gas 733 mmHg      Modality Simple Mask     FIO2 32 %      Flow Rate 3.0 lpm      Ventilator Mode NA     Note --     Notified Who PA     Notified By [id number]     Notified Time 07/20/2023 15:04     Collected by CB     Comment: Meter: [id number]     :  [id number]        pH, Temp Corrected --     pCO2, Temperature Corrected --     pO2, Temperature Corrected --          Imaging Results (Last 24 Hours)       Procedure Component Value Units Date/Time    XR Chest 1 View [[id number]] Collected: 07/20/23 1538     Updated: 07/20/23 1547    Narrative:      PROCEDURE: XR CHEST 1 VW-        HISTORY: soa     COMPARISON: June 12, 2023.     FINDINGS: The heart is enlarged, but stable. Pulmonary vascular  congestion is stable. There is mild interstitial disease bilaterally  which is stable.  There is no pneumothorax. There are no acute osseous  abnormalities.       Impression:      No significant interval change.                       Images were reviewed, interpreted, and dictated by Dr. Helena Salcido MD  Transcribed by Charo Marshall PA-C.     This report was signed and finalized on 7/20/2023 3:45 PM by Helena Salcido MD.          Orders (last 24 hrs)        Start     Ordered    07/22/23 0600  Document Pulse Oximetry - On Room Air / Home O2 Level  Daily      Comments: Room Air Oxygen Levels Should Only Be Measured if Patient Oxygen Needs are <4L  Home O2 Oxygen Levels Should Only Be Measured Once Patient Within 2L of Home O2 Baseline  Reapply Oxygen if O2 Sat Drops Below 88%    07/20/23 1815    07/21/23 0916  Red Rash Within Skin Fold Care Q12H  Every 12 Hours        Comments: - Notify Provider of Severe Rashes Within Skin Folds That May Require Antifungal Cream or Powder (Order Required)  -  Wash Skin Folds With Soap & Water, Pat Dry  - Apply Light Coating of Appropriate Cream (Z Guard or Miconazole) or Powder (Miconazole) - If Ordered  - Keep Area Dry With Absorbent Material (Pillow Case, Dry Wash Cloth, Gauze), Change Every 12 Hours & As Needed  - Consult Wound Care if Rash Does Not Improve After 3 Days    07/21/23 0915    07/21/23 0916  Follow Pressure Ulcer Prevention Measures Policy  Continuous        Comments: Implement Appropriate Pressure Ulcer Prevention Measures  - Open Order Report to View Full Instructions  Enter Wound LDA & Document Assessment  Add Wound Care Plan  Add Patient Education Per Policy    07/21/23 0915    07/21/23 0800  Oral Care  2 Times Daily       07/20/23 1815 07/21/23 0702  Inpatient Pulmonology Consult  IN AM,   Status:  Canceled        Specialty:  Pulmonary Disease  Provider:  Javed Oconnor MD    07/20/23 1815 07/21/23 0702  Inpatient Cardiology Consult  IN AM,   Status:  Canceled        Specialty:  Cardiology  Provider:  Rio Best MD    07/20/23 1815 07/21/23 0702  Inpatient Pulmonology Consult  IN AM        Specialty:  Pulmonary Disease  Provider:  Javed Oconnor MD    07/21/23 0628    07/21/23 0702  Inpatient Cardiology Consult  IN AM        Specialty:  Cardiology  Provider:  Rio Best MD    07/21/23 0628    07/21/23 0600  Tobacco Cessation Education  Daily      Comments: Document in Education Activity    07/20/23 1815 07/21/23 0600  Respiratory Treatment Education (MDI / Spacer / Nebulizer)  Daily      Comments: Document in Education Activity    07/20/23 1815 07/21/23 0600  COPD Education  Daily      Comments: Document in Education Activity    07/20/23 1815 07/21/23 0600  Basic Metabolic Panel  Morning Draw         07/20/23 1815 07/20/23 2130  budesonide (PULMICORT) nebulizer solution 0.5 mg  2 Times Daily - RT         07/20/23 1815 07/20/23 2100  apixaban (ELIQUIS) tablet 5 mg  Every 12 Hours Scheduled          07/20/23 1815 07/20/23 2100  DULoxetine (CYMBALTA) DR capsule 60 mg  2 Times Daily         07/20/23 1815 07/20/23 2100  lisinopril (PRINIVIL,ZESTRIL) tablet 10 mg  Nightly         07/20/23 1815 07/20/23 2100  pantoprazole (PROTONIX) EC tablet 40 mg  Nightly         07/20/23 1815 07/20/23 2100  sotalol (BETAPACE) tablet 80 mg  Every 12 Hours Scheduled         07/20/23 1815 07/20/23 2100  rOPINIRole (REQUIP) tablet 3 mg  Nightly         07/20/23 1815 07/20/23 2100  sodium chloride 0.9 % flush 10 mL  Every 12 Hours Scheduled         07/20/23 1815 07/20/23 2100  sennosides-docusate (PERICOLACE) 8.6-50 MG per tablet 2 tablet  2 Times Daily        [facility] for full Linked Orders Report.    07/20/23 1815 07/20/23 2000  Cough / Deep Breathe  Every 4 Hours       07/20/23 1815 07/20/23 2000  Vital Signs  Every 4 Hours       07/20/23 1815 07/20/23 1900  Magnesium Oxide -Mg Supplement tablet 400 mg  Daily         07/20/23 1815 07/20/23 1900  ipratropium-albuterol (DUO-NEB) nebulizer solution 3 mL  Every 6 Hours - RT         07/20/23 1815 07/20/23 1900  predniSONE (DELTASONE) tablet 40 mg  Daily With Breakfast         07/20/23 1815 07/20/23 1817  NIPPV (CPAP or BIPAP)  At Bedtime & As Needed-RT        Comments: RT to titrate    07/20/23 1816 07/20/23 1816  PT Consult: Eval & Treat Functional Mobility Below Baseline  Once         07/20/23 1815 07/20/23 1816  OT Consult: Eval & Treat  Once        Comments: Reason Why ot Needed: Needs on DC    07/20/23 1815 07/20/23 1814  benzonatate (TESSALON) capsule 200 mg  3 Times Daily PRN         07/20/23 1815 07/20/23 1814  calcium carbonate (TUMS) chewable tablet 500 mg (200 mg elemental)  2 Times Daily PRN         07/20/23 1815 07/20/23 1814  ondansetron (ZOFRAN) tablet 4 mg  Every 6 Hours PRN        See Hypers[facility] for full Linked Orders Report.    07/20/23 1815 07/20/23 1814  ondansetron (ZOFRAN) injection 4 mg  Every 6 Hours  PRN        See Hyperspace for full Linked Orders Report.    07/20/23 1815    07/20/23 1814  temazepam (RESTORIL) capsule 15 mg  Nightly PRN         07/20/23 1815    07/20/23 1814  acetaminophen (TYLENOL) tablet 650 mg  Every 4 Hours PRN        See Hyperspace for full Linked Orders Report.    07/20/23 1815    07/20/23 1814  acetaminophen (TYLENOL) 160 MG/5ML solution 650 mg  Every 4 Hours PRN        See Hyperspace for full Linked Orders Report.    07/20/23 1815    07/20/23 1814  acetaminophen (TYLENOL) suppository 650 mg  Every 4 Hours PRN        See Hyperspace for full Linked Orders Report.    07/20/23 1815 07/20/23 1814  Potassium Replacement - Follow Nurse / BPA Driven Protocol  As Needed         07/20/23 1815 07/20/23 1814  Magnesium Standard Dose Replacement - Follow Nurse / BPA Driven Protocol  As Needed         07/20/23 1815 07/20/23 1814  Phosphorus Replacement - Follow Nurse / BPA Driven Protocol  As Needed         07/20/23 1815 07/20/23 1814  Calcium Replacement - Follow Nurse / BPA Driven Protocol  As Needed         07/20/23 1815 07/20/23 1814  Reason for COPD Admission: Natural Disease Progression  Once         07/20/23 1815    07/20/23 1814  Intake & Output  Every Shift       07/20/23 1815 07/20/23 1814  Weigh Patient  Once         07/20/23 1815 07/20/23 1814  Insert Peripheral IV  Once         07/20/23 1815 07/20/23 1814  Saline Lock & Maintain IV Access  Continuous,   Status:  Canceled         07/20/23 1815    07/20/23 1814  Code Status and Medical Interventions:  Continuous         07/20/23 1815    07/20/23 1814  VTE Prophylaxis Not Indicated: Other: Patient Currently Anticoagulated / Receiving Prophylaxis  Once         07/20/23 1815    07/20/23 1814  Continuous Cardiac Monitoring  Continuous        Comments: Follow Standing Orders As Outlined in Process Instructions (Open Order Report to View Full Instructions)    07/20/23 1815 07/20/23 1814  Telemetry - Maintain IV Access   Continuous         07/20/23 1815    07/20/23 1814  Telemetry - Place Orders & Notify Provider of Results When Patient Experiences Acute Chest Pain, Dysrhythmia or Respiratory Distress  Until Discontinued         07/20/23 1815 07/20/23 1814  May Be Off Telemetry for Tests  Continuous         07/20/23 1815 07/20/23 1814  Diet: Cardiac Diets, Fluid Restriction (240 mL/tray) Diets; Low Sodium (2g); 2000 mL/day; Texture: Regular Texture (IDDSI 7); Fluid Consistency: Thin (IDDSI 0)  Diet Effective Now         07/20/23 1815    07/20/23 1813  nitroglycerin (NITROSTAT) SL tablet 0.4 mg  Every 5 Minutes PRN         07/20/23 1815 07/20/23 1813  sodium chloride 0.9 % infusion 40 mL  As Needed         07/20/23 1815    07/20/23 1813  polyethylene glycol (MIRALAX) packet 17 g  Daily PRN        See Hyperspace for full Linked Orders Report.    07/20/23 1815 07/20/23 1813  bisacodyl (DULCOLAX) EC tablet 5 mg  Daily PRN        See Hyperspace for full Linked Orders Report.    07/20/23 1815    07/20/23 1813  bisacodyl (DULCOLAX) suppository 10 mg  Daily PRN        See Hyperspace for full Linked Orders Report.    07/20/23 1815    07/20/23 1813  sodium chloride 0.9 % flush 10 mL  As Needed         07/20/23 1815    07/20/23 1812  pramipexole (MIRAPEX) tablet 1 mg  2 Times Daily PRN         07/20/23 1815    07/20/23 1811  cyclobenzaprine (FLEXERIL) tablet 5 mg  3 Times Daily PRN         07/20/23 1815    07/20/23 1752  Inpatient Case Management  Consult  Once        Provider:  (Not yet assigned)    07/20/23 1757    07/20/23 1621  furosemide (LASIX) injection 80 mg  Once         07/20/23 1605    07/20/23 1610  Cardiac Monitoring  Continuous,   Status:  Canceled        Comments: Follow Standing Orders As Outlined in Process Instructions (Open Order Report to View Full Instructions)    07/20/23 1609    07/20/23 1608  Inpatient Admission  Once         07/20/23 1609    07/20/23 1545  NIPPV (CPAP or BIPAP)  Until  Discontinued,   Status:  Canceled        Comments: RT to titrate    07/20/23 1545    07/20/23 1518  Scan Slide  Once         07/20/23 1517    07/20/23 1501  Blood Gas, Arterial With Co-Ox  PROCEDURE ONCE         07/20/23 1500    07/20/23 1450  ipratropium-albuterol (DUO-NEB) nebulizer solution 3 mL  Once         07/20/23 1434    07/20/23 1450  methylPREDNISolone sodium succinate (SOLU-Medrol) injection 125 mg  Once         07/20/23 1434    07/20/23 1435  Insert Peripheral IV  Once        See Hyperspace for full Linked Orders Report.    07/20/23 1434    07/20/23 1435  COVID-19 and FLU A/B PCR - Swab, Nasopharynx  Once         07/20/23 1434    07/20/23 1435  BNP  Once         07/20/23 1434    07/20/23 1435  XR Chest 1 View  1 Time Imaging         07/20/23 1434    07/20/23 1435  ECG 12 Lead Dyspnea  Once         07/20/23 1434    07/20/23 1435  Blood Gas, Arterial -With Co-Ox Panel: Yes  STAT         07/20/23 1434    07/20/23 1434  sodium chloride 0.9 % flush 10 mL  As Needed        See Hyperspace for full Linked Orders Report.    07/20/23 1434    07/20/23 1434  CBC & Differential  Once         07/20/23 1434    07/20/23 1434  Comprehensive Metabolic Panel  Once         07/20/23 1434    07/20/23 1434  CBC Auto Differential  PROCEDURE ONCE         07/20/23 1434    Unscheduled  Red Rash Within Skin Fold Care PRN  As Needed      Comments: - Notify Provider of Severe Rashes Within Skin Folds That May Require Antifungal Cream or Powder (Order Required)  - Wash Skin Folds With Soap & Water, Pat Dry  - Apply Light Coating of Appropriate Cream (Z Guard or Miconazole) or Powder (Miconazole) - If Ordered  - Keep Area Dry With Absorbent Material (Pillow Case, Dry Wash Cloth, Gauze), Change Every 12 Hours & As Needed  - Consult Wound Care if Rash Does Not Improve After 3 Days    07/21/23 0915    Pending  ECG 12 Lead Dyspnea  Once         Pending    --  SCANNED - TELEMETRY           07/20/23 0000    --  SCANNED - TELEMETRY            07/20/23 0000    --  SCANNED - TELEMETRY           07/20/23 0000                  Physician Progress Notes (last 24 hours)  Notes from 07/20/23 0939 through 07/21/23 0939   No notes of this type exist for this encounter.       Consult Notes (last 24 hours)  Notes from 07/20/23 0939 through 07/21/23 0939   No notes of this type exist for this encounter.

## 2023-07-21 NOTE — CASE MANAGEMENT/SOCIAL WORK
Discharge Planning Assessment   [facility]     Patient Name: Claudia Stokes  MRN: [id number]  Today's Date: 7/21/2023    Admit Date: 7/20/2023    Plan: Dcp initiated with pt at bedside. Pt provided demographics. She does not have a LW,POA, or been inpatient in past 30 days. Her primary is Dr. Umaña and pharmacy is [facility], she is enrolled in meds to bed. Home DME includes nebulizer, rollator,wc,bath chair with , bp cuff, O2 3L provided by [employer]. Pt request walker, transportation and laundry assistance. She lives with her son and his girlfriend, son provides transportation. Financial concerns denied. Dcp is to return home with son.   Discharge Needs Assessment       Row Name 07/21/23 1158       Living Environment    People in Home child(yaa), adult    Current Living Arrangements home    Duration at Residence 6 months    Potentially Unsafe Housing Conditions none    Primary Care Provided by self    Family Caregiver if Needed child(yaa), adult    Quality of Family Relationships helpful;involved    Able to Return to Prior Arrangements yes       Resource/Environmental Concerns    Resource/Environmental Concerns none    Transportation Concerns other (see comments)  states her son drives her, has new job, does not qualify for federated       Food Insecurity    Within the past 12 months, you worried that your food would run out before you got the money to buy more. Never true    Within the past 12 months, the food you bought just didn't last and you didn't have money to get more. Never true       Transition Planning    Patient/Family Anticipates Transition to home    Transportation Anticipated family or friend will provide       Discharge Needs Assessment    Readmission Within the Last 30 Days no previous admission in last 30 days    Equipment Currently Used at Home nebulizer;rollator;wheelchair;shower chair;bp cuff    Concerns to be Addressed no discharge needs identified    Anticipated Changes Related to Illness none     Equipment Needed After Discharge walker, rolling  requested                   Discharge Plan       Row Name 07/21/23 1203       Plan    Plan Dcp initiated with pt at bedside. Pt provided demographics. She does not have a LW,POA, or been inpatient in past 30 days. Her primary is Dr. Umaña and pharmacy is [facility], she is enrolled in meds to bed. Home DME includes nebulizer, rollator,wc,bath chair with , bp cuff, O2 3L provided by [employer]. Pt request walker, transportation and laundry assistance. She lives with her son and his girlfriend, son provides transportation. Financial concerns denied. Dcp is to return home with son.                  Continued Care and Services - Admitted Since 7/20/2023    Coordination has not been started for this encounter.       Selected Continued Care - Prior Encounters Includes continued care and service providers with selected services from prior encounters from 4/21/2023 to 7/21/2023      Discharged on 6/7/2023 Admission date: 6/5/2023 - Discharge disposition: Home or Self Care      Durable Medical Equipment       Service Provider Selected Services Address Phone Fax Patient Preferred    [facility] [address] [place] [id number] [phone] --       Internal Comment last updated by Rosenda Vega, RN 6/7/2023 1350    wc                                        Demographic Summary       Row Name 07/21/23 1155       General Information    Admission Type inpatient    Arrived From emergency department    Required Notices Provided Important Message from Medicare    Referral Source admission list    Reason for Consult discharge planning    Preferred Language English       Contact Information    Permission Granted to Share Info With family/designee                   Functional Status       Row Name 07/21/23 1157       Functional Status    Usual Activity Tolerance moderate    Current Activity Tolerance moderate       Functional  Status, IADL    Medications independent    Meal Preparation independent    Housekeeping assistive person    Laundry assistive person    Shopping assistive equipment and person       Mental Status    General Appearance WDL WDL       Mental Status Summary    Recent Changes in Mental Status/Cognitive Functioning no changes       Employment/    Employment Status retired                   Psychosocial    No documentation.                  Abuse/Neglect    No documentation.                  Legal    No documentation.                  Substance Abuse    No documentation.                  Patient Forms    No documentation.                     Rosenda Vega RN

## 2023-07-21 NOTE — CONSULTS
Date of admission:  7/20/2023    Date of consultation:   July 21, 2023    Requested by:   Hospitalist Service.     PCP: Troy Umaña MD    Reason:  Acute hypercarbic respiratory failure    History of Present Illness:  73 y.o. female with past medical history significant for atrial fibrillation and COPD who started complaining of shortness of breath, cough and phlegm production for the past few days. Patient was not complaining of subjective chills.  The patient says that her symptoms started somewhat slowly. she says that the shortness of breath is worse than baseline.  Shortness of breath seems to be worse with exertion but also became somewhat noticeable even at rest.  She also noticed palpitations and actually says that she has that feeling occasionally.  She is aware of atrial fibrillation.    She also mentioned lower extremity swelling with degree of orthopnea over the past 2 days.    she denies any sick contacts.     The patient says that she used to smoke about a pack a day for 30 years or so but quit a few years ago.    she was using her medications regularly at home.  The patient says that she also started using rescue medication more frequently without any significant improvement     The patient's symptoms continued to worsen and she was admitted to the hospital and pulmonary Consultation was requested for further recommendations.       Review of System: All other review of systems negative except indicated in HPI     Past Medical History: Pertinent history reviewed, as appropriate. Negative, except noted below or in HPI.  If this history could not be obtained due to a reason, the reason is listed in the HPI.  Past Medical History:   Diagnosis Date    A-fib     COPD (chronic obstructive pulmonary disease)     Hypertension     Impaired functional mobility, balance, gait, and endurance          Past Surgical History: Pertinent history reviewed, as appropriate. Negative, except noted below or in HPI. If  "this history could not be obtained due to a reason, the reason is listed in the HPI.  Past Surgical History:   Procedure Laterality Date    BREAST BIOPSY      VENA CAVA FILTER PLACEMENT           Family History: Pertinent history reviewed, as appropriate. Negative, except noted below or in HPI. If this history could not be obtained due to a reason, the reason is listed in the HPI.  History reviewed. No pertinent family history.      Social History: Pertinent history reviewed, as appropriate. Negative, except noted below or in HPI. If this history could not be obtained due to a reason, the reason is listed in the HPI.  Social History     Socioeconomic History    Marital status: Single   Tobacco Use    Smoking status: Former     Packs/day: 1.00     Years: 25.00     Pack years: 25.00     Types: Cigarettes     Quit date:      Years since quittin.5     Passive exposure: Never    Smokeless tobacco: Never   Vaping Use    Vaping Use: Never used   Substance and Sexual Activity    Alcohol use: Yes     Comment: occassional    Drug use: Never    Sexual activity: Defer             Physical Exam:  /67 (BP Location: Left arm, Patient Position: Sitting)   Pulse 109   Temp 98.6 °F (37 °C) (Oral)   Resp 20   Ht 160 cm (62.99\")   Wt 117 kg (258 lb 6.1 oz)   SpO2 99%   BMI 45.78 kg/m²     Constitutional:            Vital signs reviewed                     General: Mild respiratory distress noted.    Eyes:            Extraocular movement was intact.            Pupils appeared equal            Conjunctiva: Pink    ENT:             Hearing was intact              No nasal erythema noted.              Oropharynx was moist. No lesions noted.     Neck:             Supple.?  Positive JVD noted.              Thyroid gland did not seem to be enlarged    Cardiovascular:              S1 + S2.  Tachycardic and irregular     Lungs/Respiratory:            Respiratory effort was mildly labored            Hyperresonance to " percussion.            Decreased Air Entry Bilaterally with mild wheezing.            Bibasilar crackles noted as well.    Abdomen/GI:            Soft.  Bowel sounds were positive.  No obvious organomegaly.    Musculoskeletal/Extremities:            1+ edema noted.            No cyanosis noted            No clubbing noted            Gait could not be assessed at this time.    Neurologic:             Awake, alert and oriented x 3.             Able to follow simple commands.    Psychiatric:             Affect appeared fair.             Awake, alert and oriented x 3.    Skin:             Chronic lower extremity skin changes noted.             Warm and dry      Labs: Reviewed. Pertinent labs were noted.   Results from last 7 days   Lab Units 07/20/23  1511   WBC 10*3/mm3 6.79   HEMOGLOBIN g/dL 10.6*   HEMATOCRIT % 36.3   PLATELETS 10*3/mm3 256   NEUTROPHIL % % 80.9*   NEUTROS ABS 10*3/mm3 5.49   EOSINOPHIL % % 1.6   EOS ABS 10*3/mm3 0.11   LYMPHOCYTE % % 10.6*   LYMPHS ABS 10*3/mm3 0.72       Lab Results   Component Value Date    PROCALCITO 0.06 03/21/2023       Lab Results   Component Value Date    CRP 6.07 (H) 03/21/2023       No results found for: SEDRATE    Lab Results   Component Value Date    PROBNP 1,788.0 (H) 07/20/2023    PROBNP 258.9 03/21/2023       Results from last 7 days   Lab Units 07/21/23  0607 07/20/23  1511   SODIUM mmol/L 136 138   POTASSIUM mmol/L 5.0 5.1   CHLORIDE mmol/L 91* 94*   CO2 mmol/L 40.7* 38.0*   BUN mg/dL 21 16   CREATININE mg/dL 0.74 0.66   CALCIUM mg/dL 8.8 9.1   ANION GAP mmol/L 4.3* 6.0   BILIRUBIN mg/dL  --  0.6   ALK PHOS U/L  --  137*   ALT (SGPT) U/L  --  22   AST (SGOT) U/L  --  22   GLUCOSE mg/dL 161* 146*   TOTAL PROTEIN g/dL  --  6.8   ALBUMIN g/dL  --  3.9             Lab Results   Component Value Date    TSH 1.900 05/12/2023       No results found for: FREET4    No results found for: INR    No results found for: CKTOTAL    No components found for: HSTROPT    Lab Results    Component Value Date    TROPONINT 6 06/12/2023    TROPONINT 7 06/05/2023    TROPONINT <6 03/21/2023       No results found for: DDIMER    Brief Urine Lab Results  (Last result in the past 365 days)        Color   Clarity   Blood   Leuk Est   Nitrite   Protein   CREAT   Urine HCG        06/05/23 2115 Yellow   Clear   Negative   Small (1+)   Negative   Negative                     Micro: As of July 21, 2023   No results found for: RESPCX  No results found for: BCIDPCR  Lab Results   Component Value Date    BLOODCX No growth at 5 days 06/06/2023    BLOODCX No growth at 5 days 06/06/2023     No results found for: URINECX  No results found for: MRSACX  No results found for: MRSAPCR  No results found for: URCX  No components found for: LOWRESPCF  No results found for: THROATCX  No results found for: CULTURES  No components found for: STREPBCX  No results found for: STREPPNEUAG  No results found for: LEGIONELLA  No results found for: MYCOPLASCX  No results found for: GCCX  No results found for: WOUNDCX  No results found for: BODYFLDCX    No results found for: FLU    No results found for: ADENOVIRUS  No results found for: MS027Z  No results found for: CVHKU1  No results found for: CVNL63  No results found for: CVOC43  No results found for: HUMETPNEVS  No results found for: HURVEV  Lab Results   Component Value Date    FLUBPCR Not Detected 07/20/2023     No results found for: PARAINFLUE  No results found for: PARAFLUV2  No results found for: PARAFLUV3  No results found for: PARAFLUV4  No results found for: BPERTPCR  No results found for: [id number]  No results found for: CPNEUPCR  No results found for: MPNEUMO  Lab Results   Component Value Date    FLUAPCR Not Detected 07/20/2023     No results found for: FLUAH3  No results found for: FLUAH1  No results found for: RSV  No results found for: BPARAPCR    COVID 19:  Lab Results   Component Value Date    COVID19 Not Detected 07/20/2023           No results found for:  THCURSCR  No results found for: PCPUR  No results found for: COCAINEUR  No results found for: METAMPSCNUR  No results found for: LABOPIASCN  No results found for: AMPHETSCREEN  No results found for: LABBENZSCN  No results found for: TRICYCLICSCN  No results found for: LABMETHSCN  No results found for: BARBITSCNUR  No results found for: OXYCODONESCN  No results found for: PROPOXSCN  No results found for: BUPRENORSCNU  No results found for: ETHANOLMGDL  No results found for: ETOHPCT      ABG: Reviewed  Recent Labs     03/21/23  1723 06/05/23  2153 07/20/23  1500   PHART 7.327* 7.304* 7.311*   NUW7YQV 67.3* 67.5* 77.9*   PO2ART 76.6 76.0 89.8   VUQ2SSO 35.2* 33.5* 39.3*   BASEEXCESS 7.5* 5.6* 10.5*           Lab Results   Component Value Date    LACTATE 0.8 06/05/2023         Imaging Study: Latest imaging studies was reviewed personally.   Imaging Results (Last 72 Hours)       Procedure Component Value Units Date/Time    XR Chest 1 View [[id number]] Collected: 07/20/23 1538     Updated: 07/20/23 1547    Narrative:      PROCEDURE: XR CHEST 1 VW-        HISTORY: soa     COMPARISON: June 12, 2023.     FINDINGS: The heart is enlarged, but stable. Pulmonary vascular  congestion is stable. There is mild interstitial disease bilaterally  which is stable.  There is no pneumothorax. There are no acute osseous  abnormalities.       Impression:      No significant interval change.                       Images were reviewed, interpreted, and dictated by Dr. Helena Salcido MD  Transcribed by Charo Marshall PA-C.     This report was signed and finalized on 7/20/2023 3:45 PM by Helena Salcido MD.              ECHO:  Results for orders placed during the hospital encounter of 03/21/23    Adult Transthoracic Echo Complete With Contrast if Necessary Per Protocol    Interpretation Summary  1.  Normal left ventricular size and systolic function, LVEF 65-70%.  2.  Mild concentric LVH.  3.  Normal LV diastolic filling pattern.  4.  Normal  right ventricular size and systolic function.  5.  Normal left atrial volume index.  6.  No significant valvular abnormalities.               Assessment:  1.  Acute hypercarbic and hypoxic respiratory failure   2.  Acute exacerbation of COPD   3.  Atrial fibrillation with rapid ventricular rate   4.  History of smoking.    5.  Chronic hypoxemia  6.  Chronic respiratory failure/acidosis    Discussion/Recommendations:   I have adjusted the nebulized treatments as appropriate.     The patient will need to use the noninvasive ventilator for nocturnal and daytime use. Due to severity of the condition, BiPAP alone would be insufficient to maintain adequate tidal volumes.  COPD seems to be the primary cause of chronic respiratory failure in this patient requiring noninvasive ventilator. There maybe serious detriment to the patient's health, including the possibility of recurrent exacerbations, worsening symptoms/respiratory status etc., if noninvasive ventilator is not used.    I actually called the [employer] company myself, we care, and it appears that the patient had been approached by them in the morning but she refused NIV device.  After this, I discussed the need for NIV device with the patient once again and after much discussion, she has agreed for we [facility] to provide NIV device upon discharge.    Settings have been communicated via order in the chart.    I will recommend continuation of prednisone for a total of 5 days.    ABG will be repeated to assess for degree of hypercarbic respiratory failure and to identify any further changes that may need to be made to the NIV device/settings.    Chest x-ray will be repeated, as clinically indicated.    The patient does appear to have HFpEF, clinically.    Chest x-ray also suggested interstitial edema.    Cardiology has been consulted.    Patient may need outpatient work-up including PFTs.  This has already been requested.    The plan was discussed with the patient.  I have also  discussed the case with the nursing staff.    Recommendations were also discussed with the referring provider.     I would like to thank you for the opportunity to participate in the care of this patient.  We will communicate changes and recommendations, if and when necessary.    We have updated the admitting attending and nursing staff, as appropriate, on the patient's current status and plan. I will be going off shift tonight and will be unavailable. Please contact oncall pulmonologist, if available.      This document was electronically signed by Javed Oconnor MD on 07/21/23 at 09:53 EDT      Dictated utilizing Dragon dictation.

## 2023-07-22 VITALS
SYSTOLIC BLOOD PRESSURE: 117 MMHG | DIASTOLIC BLOOD PRESSURE: 68 MMHG | HEART RATE: 98 BPM | BODY MASS INDEX: 45.59 KG/M2 | RESPIRATION RATE: 22 BRPM | TEMPERATURE: 98.4 F | WEIGHT: 257.28 LBS | OXYGEN SATURATION: 95 % | HEIGHT: 63 IN

## 2023-07-22 PROBLEM — I50.9 NEW ONSET OF CONGESTIVE HEART FAILURE: Status: RESOLVED | Noted: 2023-07-20 | Resolved: 2023-07-22

## 2023-07-22 PROCEDURE — 99238 HOSP IP/OBS DSCHRG MGMT 30/<: CPT | Performed by: FAMILY MEDICINE

## 2023-07-22 PROCEDURE — 99233 SBSQ HOSP IP/OBS HIGH 50: CPT | Performed by: INTERNAL MEDICINE

## 2023-07-22 PROCEDURE — 93005 ELECTROCARDIOGRAM TRACING: CPT | Performed by: INTERNAL MEDICINE

## 2023-07-22 PROCEDURE — 94761 N-INVAS EAR/PLS OXIMETRY MLT: CPT

## 2023-07-22 PROCEDURE — 94799 UNLISTED PULMONARY SVC/PX: CPT

## 2023-07-22 PROCEDURE — 25010000002 AMIODARONE IN DEXTROSE 5% 360-4.14 MG/200ML-% SOLUTION: Performed by: INTERNAL MEDICINE

## 2023-07-22 PROCEDURE — 63710000001 PREDNISONE PER 1 MG: Performed by: FAMILY MEDICINE

## 2023-07-22 RX ORDER — PREDNISONE 20 MG/1
40 TABLET ORAL
Qty: 8 TABLET | Refills: 0 | Status: SHIPPED | OUTPATIENT
Start: 2023-07-23 | End: 2023-07-27

## 2023-07-22 RX ORDER — AMIODARONE HYDROCHLORIDE 200 MG/1
200 TABLET ORAL EVERY 12 HOURS SCHEDULED
Status: DISCONTINUED | OUTPATIENT
Start: 2023-07-22 | End: 2023-07-22 | Stop reason: HOSPADM

## 2023-07-22 RX ORDER — POTASSIUM CHLORIDE 750 MG/1
10 TABLET, FILM COATED, EXTENDED RELEASE ORAL AS NEEDED
Qty: 30 TABLET | Refills: 0 | Status: SHIPPED | OUTPATIENT
Start: 2023-07-22 | End: 2023-08-06

## 2023-07-22 RX ORDER — FUROSEMIDE 20 MG/1
20 TABLET ORAL AS NEEDED
Qty: 15 TABLET | Refills: 0 | Status: SHIPPED | OUTPATIENT
Start: 2023-07-22 | End: 2023-08-06

## 2023-07-22 RX ORDER — AMIODARONE HYDROCHLORIDE 200 MG/1
200 TABLET ORAL EVERY 12 HOURS SCHEDULED
Qty: 60 TABLET | Refills: 0 | Status: SHIPPED | OUTPATIENT
Start: 2023-07-22 | End: 2023-08-21

## 2023-07-22 RX ADMIN — PREDNISONE 40 MG: 20 TABLET ORAL at 08:27

## 2023-07-22 RX ADMIN — Medication 10 ML: at 08:28

## 2023-07-22 RX ADMIN — TIOTROPIUM BROMIDE INHALATION SPRAY 2 PUFF: 3.12 SPRAY, METERED RESPIRATORY (INHALATION) at 07:05

## 2023-07-22 RX ADMIN — DULOXETINE HYDROCHLORIDE 60 MG: 30 CAPSULE, DELAYED RELEASE ORAL at 08:27

## 2023-07-22 RX ADMIN — BUDESONIDE 0.5 MG: 0.5 INHALANT RESPIRATORY (INHALATION) at 07:04

## 2023-07-22 RX ADMIN — AMIODARONE HYDROCHLORIDE 0.5 MG/MIN: 1.8 INJECTION, SOLUTION INTRAVENOUS at 08:28

## 2023-07-22 RX ADMIN — IPRATROPIUM BROMIDE AND ALBUTEROL SULFATE 3 ML: .5; 3 SOLUTION RESPIRATORY (INHALATION) at 07:04

## 2023-07-22 RX ADMIN — AMIODARONE HYDROCHLORIDE 200 MG: 200 TABLET ORAL at 12:25

## 2023-07-22 RX ADMIN — APIXABAN 5 MG: 5 TABLET, FILM COATED ORAL at 08:27

## 2023-07-22 NOTE — CASE MANAGEMENT/SOCIAL WORK
Case Management Discharge Note      Final Note: Pt. will be returning home with her family. Pt. family will provide discharge transport.         Selected Continued Care - Admitted Since 7/20/2023       Destination    No services have been selected for the patient.                Durable Medical Equipment       Service Provider Selected Services Address Phone Fax Patient Preferred    [facility] [place] [facility] [address] [facility] [address] [place] [address] [phone] --              Dialysis/Infusion    No services have been selected for the patient.                Home Medical Care    No services have been selected for the patient.                Therapy    No services have been selected for the patient.                Community Resources    No services have been selected for the patient.                Community & DME    No services have been selected for the patient.                    Selected Continued Care - Prior Encounters Includes continued care and service providers with selected services from prior encounters from 4/21/2023 to 7/22/2023      Discharged on 6/7/2023 Admission date: 6/5/2023 - Discharge disposition: Home or Self Care      Durable Medical Equipment       Service Provider Selected Services Address Phone Fax Patient Preferred    [facility] [address] [place] [address] [address] [phone] --       Internal Comment last updated by Rosenda Vega, RN 6/7/2023 1350                                         Transportation Services  Private: Car    Final Discharge Disposition Code: 01 - home or self-care

## 2023-07-22 NOTE — PLAN OF CARE
Goal Outcome Evaluation:  Plan of Care Reviewed With: patient      Pt is being discharged per Dr. Womack. Education complete, belongings gathered.

## 2023-07-22 NOTE — PLAN OF CARE
Goal Outcome Evaluation:  Plan of Care Reviewed With: patient        Progress: no change  Outcome Evaluation: VSS. Afib with controlled rate on telemetry. Pt wore bipap for about two hours overnight. No changes. Will continue to monitor

## 2023-07-22 NOTE — DISCHARGE SUMMARY
[facility]      Name:  Claudia Stokes   Age:  73 y.o.  Sex:  female  :  1950  MRN:  [id number]   Visit Number:  [id number]    Admission Date:  2023  Date of Discharge:  2023  Primary Care Physician:  Troy Umaña MD    Important issues to note:    The patient has been started on amiodarone to 200 mg twice a day per cardiology recommendations  She needs to follow-up with cardiology clinic in the next 7 to 14 days for repeat EKG  Follow-up with PCP  Referral to disease state management for follow-up  She has been arranged a trilogy machine prior to discharge  Continue current home oxygen usage    Discharge Diagnoses:     COPD with acute exacerbation, POA  Acute on chronic hypoxic/hypercapnic respiratory failure, POA  Acute on chronic cor pulmonale/diastolic CHF, POA  Atrial fibrillation  Chronic venous insufficiency  Hypertension  Obesity hypoventilation syndrome    Problem List:     Active Hospital Problems    Diagnosis  POA    Acute on chronic respiratory failure with hypoxia and hypercapnia [J96.21, J96.22]  Yes      Resolved Hospital Problems    Diagnosis Date Resolved POA    New onset of congestive heart failure [I50.9] 2023 Yes     Presenting Problem:    Chief Complaint   Patient presents with    Shortness of Breath      Consults:     Consulting Physician(s)       Provider Relationship Rio Baires MD Consulting Physician Cardiology    Javed Oconnor MD Consulting Physician Pulmonary Disease          Procedures Performed:        History of presenting illness/Hospital Course:    The patient is a 73-year-old with a history of chronic respiratory failure with hypoxia and hypercapnia, COPD, suspected diastolic congestive heart failure, atrial fibrillation on sotalol and Eliquis, prior tobacco abuse, obesity, who presented from home due to 3 to 4-day complaint of increasing shortness of breath.  Patient notes no  significant cough but feels congested.  No chest pains or palpitations.  Has noticed increased swelling to her lower extremities.  She denies any fevers or chills.  She has been using her home Trelegy inhaler and nebulizer treatments.  She was actually seen recently and ordered a BiPAP which has yet to be set up.  She has not smoked in many years.  She follows with pulmonology in [place] and Dr. Best as her cardiologist.  She is chronically on sotalol and Eliquis.  She remains out of rhythm and is due to potentially have a cardioversion done.     In the ER, the patient was hypoxic on 3 L and was requiring up to 5 L to maintain sats greater than 90%.  She was initially ordered BiPAP.  EKG did show atrial fibrillation but otherwise no acute ST elevations.  She did have increased BNP.  Her kidney function appears stable and at baseline.  ABG was demonstrating a pH of 7.311 and a PCO2 of 77.9.  Due to concern for possible COPD exacerbation and questionable new onset heart failure we were asked admit the patient.    Patient was seen in consultation by both pulmonology and cardiology.  She likely has some degree of chronic cor pulmonale and would not be a candidate for any excessive diuresis.  Due to recently being started on sotalol, she had some evidence of QT prolongation.  Dr. Best had started patient on amiodarone while here, she was switched to oral amiodarone per Dr. Estes on day of discharge.  She has had adequate rate control since hospital stay.  We have arranged for her to have a home trilogy machine and this was delivered to her room.  Strongly encouraged her compliance with this upon discharge and to contact the company or Dr. Oconnor's office with any issues.    Vital Signs:    Temp:  [97.5 °F (36.4 °C)-98.4 °F (36.9 °C)] 98.4 °F (36.9 °C)  Heart Rate:  [] 98  Resp:  [18-22] 22  BP: (104-138)/(58-99) 117/68    Physical Exam:    General Appearance:  Alert and cooperative.  NAD   Head:  Atraumatic  and normocephalic.   Eyes: Conjunctivae and sclerae normal, no icterus. No pallor.   Ears:  Ears with no abnormalities noted.   Throat: No oral lesions, no thrush, oral mucosa moist.   Neck: Supple, trachea midline, no thyromegaly.   Back:   No kyphoscoliosis present. No tenderness to palpation.   Lungs:   Breath sounds heard bilaterally equally.  No crackles or wheezing. No Pleural rub or bronchial breathing.   Heart:  Normal S1 and S2, no murmur, no gallop, no rub. No JVD.   Abdomen:   Normal bowel sounds, no masses, no organomegaly. Soft, nontender, nondistended, no rebound tenderness.   Extremities: Supple, no edema, no cyanosis, no clubbing.   Pulses: Pulses palpable bilaterally.   Skin: No bleeding or rash.   Neurologic: Alert and oriented x 3. No facial asymmetry. Moves all four limbs. No tremors.     Pertinent Lab Results:     Results from last 7 days   Lab Units 07/21/23  0607 07/20/23  1511   SODIUM mmol/L 136 138   POTASSIUM mmol/L 5.0 5.1   CHLORIDE mmol/L 91* 94*   CO2 mmol/L 40.7* 38.0*   BUN mg/dL 21 16   CREATININE mg/dL 0.74 0.66   CALCIUM mg/dL 8.8 9.1   BILIRUBIN mg/dL  --  0.6   ALK PHOS U/L  --  137*   ALT (SGPT) U/L  --  22   AST (SGOT) U/L  --  22   GLUCOSE mg/dL 161* 146*     Results from last 7 days   Lab Units 07/20/23  1511   WBC 10*3/mm3 6.79   HEMOGLOBIN g/dL 10.6*   HEMATOCRIT % 36.3   PLATELETS 10*3/mm3 256             Results from last 7 days   Lab Units 07/20/23  1511   PROBNP pg/mL 1,788.0*             Results from last 7 days   Lab Units 07/21/23  1530   PH, ARTERIAL pH units 7.360   PO2 ART mm Hg 66.5*   PCO2, ARTERIAL mm Hg 72.3*   HCO3 ART mmol/L 40.8*           Pertinent Radiology Results:    Imaging Results (All)       Procedure Component Value Units Date/Time    XR Chest 1 View [[id number]] Collected: 07/20/23 1538     Updated: 07/20/23 1547    Narrative:      PROCEDURE: XR CHEST 1 VW-        HISTORY: soa     COMPARISON: June 12, 2023.     FINDINGS: The heart is enlarged, but  stable. Pulmonary vascular  congestion is stable. There is mild interstitial disease bilaterally  which is stable.  There is no pneumothorax. There are no acute osseous  abnormalities.       Impression:      No significant interval change.                       Images were reviewed, interpreted, and dictated by Dr. Helena Salcido MD  Transcribed by Charo Marshall PA-C.     This report was signed and finalized on 7/20/2023 3:45 PM by Helena Salcido MD.            Echo:    Results for orders placed during the hospital encounter of 03/21/23    Adult Transthoracic Echo Complete With Contrast if Necessary Per Protocol    Interpretation Summary  1.  Normal left ventricular size and systolic function, LVEF 65-70%.  2.  Mild concentric LVH.  3.  Normal LV diastolic filling pattern.  4.  Normal right ventricular size and systolic function.  5.  Normal left atrial volume index.  6.  No significant valvular abnormalities.    Condition on Discharge:      Stable.    Code status during the hospital stay:    Code Status and Medical Interventions:   Ordered at: 07/20/23 1815     Code Status (Patient has no pulse and is not breathing):    CPR (Attempt to Resuscitate)     Medical Interventions (Patient has pulse or is breathing):    Full Support     Release to patient:    Routine Release     Discharge Disposition:    Home or Self Care    Discharge Medications:       Discharge Medications        New Medications        Instructions Start Date   amiodarone 200 MG tablet  Commonly known as: PACERONE   200 mg, Oral, Every 12 Hours Scheduled      predniSONE 20 MG tablet  Commonly known as: DELTASONE   40 mg, Oral, Daily With Breakfast   Start Date: July 23, 2023            Changes to Medications        Instructions Start Date   cyclobenzaprine 5 MG tablet  Commonly known as: FLEXERIL  What changed: when to take this   5 mg, Oral, 3 Times Daily PRN      furosemide 20 MG tablet  Commonly known as: LASIX  What changed:   when to take  this  reasons to take this   TAKE ONE TABLET BY MOUTH DAILY      potassium chloride 10 MEQ CR tablet  What changed: additional instructions   10 mEq, Oral, 2 Times Daily             Continue These Medications        Instructions Start Date   acetaminophen 325 MG tablet  Commonly known as: TYLENOL   650 mg, Oral, Every 6 Hours PRN      albuterol (2.5 MG/3ML) 0.083% nebulizer solution  Commonly known as: PROVENTIL   2.5 mg, Nebulization, 4 Times Daily - RT      albuterol sulfate  (90 Base) MCG/ACT inhaler  Commonly known as: PROVENTIL HFA;VENTOLIN HFA;PROAIR HFA   Inhale 2 puffs by mouth every 4 to 6 hours as needed for cough      apixaban 5 MG tablet tablet  Commonly known as: ELIQUIS   5 mg, Oral, Every 12 Hours Scheduled      DULoxetine 60 MG capsule  Commonly known as: Cymbalta   60 mg, Oral, 2 Times Daily      Fluticasone-Umeclidin-Vilant 100-62.5-25 MCG/ACT inhaler  Commonly known as: TRELEGY   1 puff, Inhalation, Daily - RT, Rinse mouth out after use      lisinopril 10 MG tablet  Commonly known as: PRINIVIL,ZESTRIL   TAKE ONE TABLET BY MOUTH ONCE NIGHTLY      Magnesium Oxide -Mg Supplement 400 (240 Mg) MG tablet   400 mg, Oral, As Needed      omeprazole 40 MG capsule  Commonly known as: priLOSEC   40 mg, Oral, Nightly      pramipexole 1 MG tablet  Commonly known as: MIRAPEX   1 mg, Oral, 2 Times Daily PRN      rOPINIRole 3 MG tablet  Commonly known as: REQUIP   3 mg, Oral, Nightly, Take 1 hour before bedtime.             Stop These Medications      amLODIPine 2.5 MG tablet  Commonly known as: NORVASC     escitalopram 20 MG tablet  Commonly known as: LEXAPRO     ibuprofen 200 MG tablet  Commonly known as: ADVIL,MOTRIN     Sotalol HCl AF 80 MG tablet            Discharge Diet:   As tolerated    Activity at Discharge:   As tolerated    Follow-up Appointments:    Additional Instructions for the Follow-ups that You Need to Schedule       Ambulatory Referral to Disease State Management   As directed      To  dept: [facility] [[id number]]    What program(s) are you referring for?: COPD    Follow-up needed: Yes                Contact information for follow-up providers       Troy Umaña MD Follow up in 1 week(s).    Specialty: Internal Medicine  Contact information:  [address]  [address]  [facility] [address]  [phone]               Rio Rucker MD Follow up in 2 week(s).    Specialties: Cardiology, Interventional Radiology  Why: Needs hospital f/u with dr. rucker in next 1-2 weeks  Contact information:  [address]  [address]  [address]  [facility] [id number] [phone]                       Contact information for after-discharge care       Durable Medical Equipment       [facility] .    Service: Durable Medical Equipment  Contact information:  [address]  [facility] [address]  [phone]                                 Future Appointments   Date Time Provider Department Center   8/15/2023  9:00 AM [facility]ATH [facility]PTS IN [facility] C[facility]   9/18/2023  8:30 AM Rio Rucker MD [facility] CD BG R [facility]   10/16/2023  1:45 PM [facility] PULM [facility] - PFT [facility] P[facility]   10/16/2023  2:15 PM Ann Moe APRN [facility]     Test Results Pending at Discharge:           Lorraine Womack DO  07/22/23  11:47 EDT    Time: I spent 25 minutes on this discharge activity which included: face-to-face encounter with the patient, reviewing the data in the system, coordination of the care with the nursing staff as well as consultants, documentation, and entering orders.     Dictated utilizing Dragon dictation.

## 2023-07-22 NOTE — PROGRESS NOTES
Referring Provider: Lorraine Womack, *    Reason for follow-up: Atrial fibrillation, amiodarone dosing     Patient Care Team:  Troy Umaña MD as PCP - General (Internal Medicine)      SUBJECTIVE  Patient is sitting up at the edge of the bed, heart rate is better.  She wants to go home.     ROS  Review of all systems negative except as indicated.    Since I have last seen, the patient has been without any chest discomfort, shortness of breath, palpitations, dizziness or syncope.  Denies having any headache, abdominal pain, nausea, vomiting, diarrhea, constipation, loss of weight or loss of appetite.  Denies having any excessive bruising, hematuria or blood in the stool.  ROS      Personal History:    Past Medical History:   Diagnosis Date    A-fib     COPD (chronic obstructive pulmonary disease)     Hypertension     Impaired functional mobility, balance, gait, and endurance        Past Surgical History:   Procedure Laterality Date    BREAST BIOPSY      VENA CAVA FILTER PLACEMENT         History reviewed. No pertinent family history.    Social History     Tobacco Use    Smoking status: Former     Packs/day: 1.00     Years: 25.00     Pack years: 25.00     Types: Cigarettes     Quit date:      Years since quittin.5     Passive exposure: Never    Smokeless tobacco: Never   Vaping Use    Vaping Use: Never used   Substance Use Topics    Alcohol use: Yes     Comment: occassional    Drug use: Never        Medications Prior to Admission   Medication Sig Dispense Refill Last Dose    acetaminophen (TYLENOL) 325 MG tablet Take 2 tablets by mouth Every 6 (Six) Hours As Needed for Mild Pain.   Past Week    albuterol (PROVENTIL) (2.5 MG/3ML) 0.083% nebulizer solution Take 2.5 mg by nebulization 4 (Four) Times a Day.   2023    albuterol sulfate  (90 Base) MCG/ACT inhaler Inhale 2 puffs by mouth every 4 to 6 hours as needed for cough 18 g 6 2023    amLODIPine (NORVASC) 2.5 MG tablet Take 1 tablet by  mouth Every Night. 90 tablet 3 7/19/2023    apixaban (ELIQUIS) 5 MG tablet tablet Take 1 tablet by mouth Every 12 (Twelve) Hours for 30 days. Indications: Atrial Fibrillation 60 tablet 4 7/20/2023    cyclobenzaprine (FLEXERIL) 5 MG tablet Take 1 tablet by mouth 3 (Three) Times a Day As Needed for Muscle Spasms. (Patient taking differently: Take 1 tablet by mouth 2 (Two) Times a Day As Needed for Muscle Spasms.) 30 tablet 1 7/19/2023    DULoxetine (Cymbalta) 60 MG capsule Take 1 capsule by mouth 2 (Two) Times a Day. 60 capsule 1 7/20/2023    escitalopram (LEXAPRO) 20 MG tablet Take 1 tablet by mouth Daily.   Past Week    Fluticasone-Umeclidin-Vilant (TRELEGY) 100-62.5-25 MCG/ACT inhaler Inhale 1 puff Daily. Rinse mouth out after use 90 each 3 7/20/2023    ibuprofen (ADVIL,MOTRIN) 200 MG tablet Take 1 tablet by mouth Every 6 (Six) Hours As Needed for Mild Pain.   Past Week    lisinopril (PRINIVIL,ZESTRIL) 10 MG tablet TAKE ONE TABLET BY MOUTH ONCE NIGHTLY 90 tablet 3 7/19/2023    Magnesium Oxide 400 (240 Mg) MG tablet Take 1 tablet by mouth As Needed.   7/19/2023    omeprazole (priLOSEC) 40 MG capsule Take 1 capsule by mouth Every Night. 90 capsule 3 7/20/2023    rOPINIRole (REQUIP) 3 MG tablet Take 1 tablet by mouth Every Night. Take 1 hour before bedtime. 90 tablet 3 7/19/2023    Sotalol HCl AF 80 MG tablet Take 1 tablet by mouth 2 (Two) Times a Day. 60 tablet 11 7/20/2023    furosemide (LASIX) 20 MG tablet TAKE ONE TABLET BY MOUTH DAILY (Patient taking differently: Take 1 tablet by mouth As Needed.) 90 tablet 3     potassium chloride 10 MEQ CR tablet Take 1 tablet by mouth 2 (Two) Times a Day. (Patient taking differently: Take 1 tablet by mouth 2 (Two) Times a Day. Pt and son may change per doc) 30 tablet 0 More than a month    pramipexole (MIRAPEX) 1 MG tablet Take 1 tablet by mouth 2 (Two) Times a Day As Needed (pt states she takes 1mg at bedtime and 1mg at 0500).          Allergies:  Codeine, Sulfa antibiotics,  "and Tetracyclines & related    Scheduled Meds:apixaban, 5 mg, Oral, Q12H  budesonide, 0.5 mg, Nebulization, BID - RT  DULoxetine, 60 mg, Oral, BID  ipratropium-albuterol, 3 mL, Nebulization, Q6H - RT  lisinopril, 10 mg, Oral, Nightly  magnesium oxide, 400 mg, Oral, Daily  pantoprazole, 40 mg, Oral, Nightly  predniSONE, 40 mg, Oral, Daily With Breakfast  rOPINIRole, 3 mg, Oral, Nightly  senna-docusate sodium, 2 tablet, Oral, BID  sodium chloride, 10 mL, Intravenous, Q12H  tiotropium bromide monohydrate, 2 puff, Inhalation, Daily - RT      Continuous Infusions:amiodarone, 0.5 mg/min, Last Rate: 0.5 mg/min (07/22/23 0828)      PRN Meds:.  acetaminophen **OR** acetaminophen **OR** acetaminophen    benzonatate    senna-docusate sodium **AND** polyethylene glycol **AND** bisacodyl **AND** bisacodyl    calcium carbonate    Calcium Replacement - Follow Nurse / BPA Driven Protocol    cyclobenzaprine    glycerin    hydrOXYzine pamoate    Magnesium Standard Dose Replacement - Follow Nurse / BPA Driven Protocol    nitroglycerin    ondansetron **OR** ondansetron    Phosphorus Replacement - Follow Nurse / BPA Driven Protocol    Potassium Replacement - Follow Nurse / BPA Driven Protocol    pramipexole    [COMPLETED] Insert Peripheral IV **AND** sodium chloride    sodium chloride    sodium chloride    temazepam      OBJECTIVE    Vital Signs  Vitals:    07/22/23 0819 07/22/23 0837 07/22/23 0841 07/22/23 0942   BP:   110/64 105/62   BP Location:       Patient Position:       Pulse:  91  91   Resp:       Temp: 98.4 °F (36.9 °C)      TempSrc:       SpO2:       Weight:       Height:           Flowsheet Rows      Flowsheet Row First Filed Value   Admission Height 160 cm (63\") Documented at 07/20/2023 1424   Admission Weight 104 kg (230 lb) Documented at 07/20/2023 1424              Intake/Output Summary (Last 24 hours) at 7/22/2023 1024  Last data filed at 7/22/2023 0749  Gross per 24 hour   Intake 720 ml   Output 925 ml   Net -205 ml "          Telemetry: Atrial fibrillation with heart rate in the 90s    Physical Exam:  The patient is alert, oriented and in no distress.  Morbidly obese  Vital signs as noted above.  Head and neck revealed no carotid bruits or jugular venous distention.  No thyromegaly or lymphadenopathy is present  Lungs clear.  No wheezing.  Distant breath sounds  Irregularly irregular.  No murmur. No precordial rub is present.  No gallop is present.  Abdomen soft and nontender.  No organomegaly is present.  Extremities with good peripheral pulses without any pedal edema.  Skin warm and dry.  Musculoskeletal system is grossly normal.  CNS grossly normal.       Results Review:  I have personally reviewed the results from the time of this admission to 7/22/2023 10:24 EDT and agree with these findings:  []  Laboratory  []  Microbiology  []  Radiology  []  EKG/Telemetry   []  Cardiology/Vascular   []  Pathology  []  Old records  []  Other:    Most notable findings include:    Lab Results (last 24 hours)       Procedure Component Value Units Date/Time    Blood Gas, Arterial With Co-Ox [[id number]]  (Abnormal) Collected: 07/21/23 1530    Specimen: Arterial Blood Updated: 07/21/23 1530     Site Right Radial     Reddy's Test N/A     pH, Arterial 7.360 pH units      pCO2, Arterial 72.3 mm Hg      Comment: 86 Value above critical limit        pO2, Arterial 66.5 mm Hg      Comment: 84 Value below reference range        HCO3, Arterial 40.8 mmol/L      Comment: 83 Value above reference range        Base Excess, Arterial 12.7 mmol/L      Comment: 83 Value above reference range        O2 Saturation, Arterial 93.2 %      Comment: 84 Value below reference range        Hematocrit, Blood Gas 34.5 %      Comment: 84 Value below reference range        Oxyhemoglobin 91.2 %      Comment: 84 Value below reference range        Methemoglobin 0.50 %      Carboxyhemoglobin 1.6 %      A-a DO2 --     Comment: UNABLE TO CALCULATE        Barometric Pressure for  Blood Gas 731 mmHg      Modality Nasal Cannula     Flow Rate 4.0 lpm      Ventilator Mode NA     Note --     Notified Who NO CRITICAL     Notified By [id number]     Notified Time 07/21/2023 15:30     Collected by [id number]     Comment: Meter: [id number]     :  [id number]        pH, Temp Corrected --     pCO2, Temperature Corrected --     pO2, Temperature Corrected --            Imaging Results (Last 24 Hours)       ** No results found for the last 24 hours. **            LAB RESULTS (LAST 7 DAYS)    CBC  Results from last 7 days   Lab Units 07/20/23  1511   WBC 10*3/mm3 6.79   RBC 10*6/mm3 4.11   HEMOGLOBIN g/dL 10.6*   HEMATOCRIT % 36.3   MCV fL 88.3   PLATELETS 10*3/mm3 256       BMP  Results from last 7 days   Lab Units 07/21/23  0607 07/20/23  1511   SODIUM mmol/L 136 138   POTASSIUM mmol/L 5.0 5.1   CHLORIDE mmol/L 91* 94*   CO2 mmol/L 40.7* 38.0*   BUN mg/dL 21 16   CREATININE mg/dL 0.74 0.66   GLUCOSE mg/dL 161* 146*       CMP   Results from last 7 days   Lab Units 07/21/23  0607 07/20/23  1511   SODIUM mmol/L 136 138   POTASSIUM mmol/L 5.0 5.1   CHLORIDE mmol/L 91* 94*   CO2 mmol/L 40.7* 38.0*   BUN mg/dL 21 16   CREATININE mg/dL 0.74 0.66   GLUCOSE mg/dL 161* 146*   ALBUMIN g/dL  --  3.9   BILIRUBIN mg/dL  --  0.6   ALK PHOS U/L  --  137*   AST (SGOT) U/L  --  22   ALT (SGPT) U/L  --  22       BNP        TROPONIN        CoAg        Creatinine Clearance  Estimated Creatinine Clearance: 84.8 mL/min (by C-G formula based on SCr of 0.74 mg/dL).    ABG  Results from last 7 days   Lab Units 07/21/23  1530 07/20/23  1500   PH, ARTERIAL pH units 7.360 7.311*   PCO2, ARTERIAL mm Hg 72.3* 77.9*   PO2 ART mm Hg 66.5* 89.8   O2 SATURATION ART % 93.2* 97.2   BASE EXCESS ART mmol/L 12.7* 10.5*       Radiology  XR Chest 1 View    Result Date: 7/20/2023  No significant interval change.        Images were reviewed, interpreted, and dictated by Dr. Helena Salcido MD Transcribed by Charo Marshall PA-C.  This report was  signed and finalized on 7/20/2023 3:45 PM by Helena Salcido MD.       EKG  I personally viewed and interpreted the patient's EKG/Telemetry data:  ECG 12 Lead Drug Monitoring; Amiodarone   Preliminary Result   Test Reason : Drug Monitoring   Blood Pressure :   */*   mmHG   Vent. Rate :  78 BPM     Atrial Rate :  92 BPM      P-R Int :   * ms          QRS Dur :  80 ms       QT Int : 460 ms       P-R-T Axes :   *  90  77 degrees      QTc Int : 524 ms      Atrial fibrillation   Rightward axis   Low voltage QRS   Prolonged QT   Abnormal ECG   When compared with ECG of 21-JUL-2023 14:35, (Unconfirmed)   No significant change was found      Referred By:            Confirmed By:       ECG 12 Lead Dyspnea   Final Result      SCANNED - TELEMETRY     Final Result      SCANNED - TELEMETRY     Final Result      SCANNED - TELEMETRY     Final Result      SCANNED - TELEMETRY     Final Result      ECG 12 Lead Drug Monitoring; Amiodarone    (Results Pending)         Echocardiogram:    Results for orders placed during the hospital encounter of 03/21/23    Adult Transthoracic Echo Complete With Contrast if Necessary Per Protocol    Interpretation Summary  1.  Normal left ventricular size and systolic function, LVEF 65-70%.  2.  Mild concentric LVH.  3.  Normal LV diastolic filling pattern.  4.  Normal right ventricular size and systolic function.  5.  Normal left atrial volume index.  6.  No significant valvular abnormalities.        Stress Test:         Cardiac Catheterization:  No results found for this or any previous visit.         Other:         ASSESSMENT & PLAN:    Principal Problem:    New onset of congestive heart failure  Active Problems:    Acute on chronic respiratory failure with hypoxia and hypercapnia    Atrial fibrillation with rapid ventricular response  [id number] score is 4  Continue full anticoagulation with Eliquis 5 mg p.o. twice daily for stroke prevention.  Recent cardioversion with recurrent atrial  fibrillation.  Echocardiogram shows preserved LV function with normal diastolic function  Sotalol has been discontinued.  Started on amiodarone, completed IV loading.  Start amiodarone 200 mg p.o. twice daily.  Consider adding a beta-blocker for better rate control  Calcium channel blocker such as Cardizem can also be added instead of the beta-blocker  A-fib is being precipitated from underlying COPD, respiratory failure, morbid obesity.    Hypertension  Blood pressures well controlled on lisinopril    Respiratory failure  Hypercarbic respiratory failure secondary to COPD  PCO2 72 today  Cor pulmonale.  Supplemental oxygen.  Home trilogy    Morbid obesity  BMI is 45.6.  Respiratory failure is also secondary to restrictive lung disease.  Diet, exercise, weight loss and lifestyle modification.  Encouraged the use of CPAP      Randolph Estes MD  07/22/23  10:24 EDT

## 2023-07-23 NOTE — PAYOR COMM NOTE
"Deisi Alvarez (73 y.o. Female)       Date of Birth   1950    Social Security Number       Address   [address] [place], [place] [address]    Home Phone   [phone]    MRN   [id number]       [employer]   None    Marital Status   Single                            Admission Date   7/20/23    Admission Type   Emergency    Admitting Provider   Lorraine Womack DO    Attending Provider       Department, Room/Bed   [facility] TELEMETRY 4, 423/1       Discharge Date   7/22/2023    Discharge Disposition   Home or Self Care    Discharge Destination   Home                              Attending Provider: (none)   Allergies: Codeine, Sulfa Antibiotics, Tetracyclines & Related    Isolation: None   Infection: COVID (rule out) (07/20/23)   Code Status: Prior    Ht: 160 cm (62.99\")   Wt: 117 kg (257 lb 4.4 oz)    Admission Cmt: None   Principal Problem: None                  Active Insurance as of 7/20/2023       Primary Coverage       Payor Plan Insurance Group Employer/Plan Group    ANTHEM MEDICARE REPLACEMENT ANTHEM MEDICARE ADVANTAGE [id number]       Payor Plan Address Payor Plan Phone Number Payor Plan Fax Number Effective Dates    [address] [phone]  11/1/2022 - None Entered    [facility] [id number]         Subscriber Name Subscriber Birth Date Member ID       DEISI ALVAREZ 1950 [id number]               Secondary Coverage       Payor Plan Insurance Group Employer/Plan Group    ANTHEM MEDICAID ANTHEM MEDICAID [id number]       Payor Plan Address Payor Plan Phone Number Payor Plan Fax Number Effective Dates    [address] [address] [phone]  11/1/2022 - None Entered    [facility] [address]         Subscriber Name Subscriber Birth Date Member ID       DEISI ALVAREZ 1950 [id number]                     Emergency Contacts        (Rel.) Home Phone Work Phone Mobile Phone    DES DUVAL (Daughter) [phone] -- --    Rodrigo Chong (Son) -- -- [phone]    " DEE ARVIZU (Sister) [phone] -- --                 Discharge Summary        Lorraine Womack, DO at 23 1147              [facility]   DISCHARGE SUMMARY      Name:  Claudia Stokes   Age:  73 y.o.  Sex:  female  :  1950  MRN:  [id number]   Visit Number:  [id number]    Admission Date:  2023  Date of Discharge:  2023  Primary Care Physician:  Troy Umaña MD    Important issues to note:    The patient has been started on amiodarone to 200 mg twice a day per cardiology recommendations  She needs to follow-up with cardiology clinic in the next 7 to 14 days for repeat EKG  Follow-up with PCP  Referral to disease state management for follow-up  She has been arranged a trilogy machine prior to discharge  Continue current home oxygen usage    Discharge Diagnoses:     COPD with acute exacerbation, POA  Acute on chronic hypoxic/hypercapnic respiratory failure, POA  Acute on chronic cor pulmonale/diastolic CHF, POA  Atrial fibrillation  Chronic venous insufficiency  Hypertension  Obesity hypoventilation syndrome    Problem List:     Active Hospital Problems    Diagnosis  POA    Acute on chronic respiratory failure with hypoxia and hypercapnia [J96.21, J96.22]  Yes      Resolved Hospital Problems    Diagnosis Date Resolved POA    New onset of congestive heart failure [I50.9] 2023 Yes     Presenting Problem:    Chief Complaint   Patient presents with    Shortness of Breath      Consults:     Consulting Physician(s)       Provider Relationship Rio Baires MD Consulting Physician Cardiology    Javed Oconnor MD Consulting Physician Pulmonary Disease          Procedures Performed:        History of presenting illness/Hospital Course:    The patient is a 73-year-old with a history of chronic respiratory failure with hypoxia and hypercapnia, COPD, suspected diastolic congestive heart failure, atrial fibrillation on sotalol and Eliquis, prior  tobacco abuse, obesity, who presented from home due to 3 to 4-day complaint of increasing shortness of breath.  Patient notes no significant cough but feels congested.  No chest pains or palpitations.  Has noticed increased swelling to her lower extremities.  She denies any fevers or chills.  She has been using her home Trelegy inhaler and nebulizer treatments.  She was actually seen recently and ordered a BiPAP which has yet to be set up.  She has not smoked in many years.  She follows with pulmonology in [place] and Dr. Best as her cardiologist.  She is chronically on sotalol and Eliquis.  She remains out of rhythm and is due to potentially have a cardioversion done.     In the ER, the patient was hypoxic on 3 L and was requiring up to 5 L to maintain sats greater than 90%.  She was initially ordered BiPAP.  EKG did show atrial fibrillation but otherwise no acute ST elevations.  She did have increased BNP.  Her kidney function appears stable and at baseline.  ABG was demonstrating a pH of 7.311 and a PCO2 of 77.9.  Due to concern for possible COPD exacerbation and questionable new onset heart failure we were asked admit the patient.    Patient was seen in consultation by both pulmonology and cardiology.  She likely has some degree of chronic cor pulmonale and would not be a candidate for any excessive diuresis.  Due to recently being started on sotalol, she had some evidence of QT prolongation.  Dr. Best had started patient on amiodarone while here, she was switched to oral amiodarone per Dr. Estes on day of discharge.  She has had adequate rate control since hospital stay.  We have arranged for her to have a home trilogy machine and this was delivered to her room.  Strongly encouraged her compliance with this upon discharge and to contact the company or Dr. Oconnor's office with any issues.    Vital Signs:    Temp:  [97.5 °F (36.4 °C)-98.4 °F (36.9 °C)] 98.4 °F (36.9 °C)  Heart Rate:  [] 98  Resp:   [18-22] 22  BP: (104-138)/(58-99) 117/68    Physical Exam:    General Appearance:  Alert and cooperative.  NAD   Head:  Atraumatic and normocephalic.   Eyes: Conjunctivae and sclerae normal, no icterus. No pallor.   Ears:  Ears with no abnormalities noted.   Throat: No oral lesions, no thrush, oral mucosa moist.   Neck: Supple, trachea midline, no thyromegaly.   Back:   No kyphoscoliosis present. No tenderness to palpation.   Lungs:   Breath sounds heard bilaterally equally.  No crackles or wheezing. No Pleural rub or bronchial breathing.   Heart:  Normal S1 and S2, no murmur, no gallop, no rub. No JVD.   Abdomen:   Normal bowel sounds, no masses, no organomegaly. Soft, nontender, nondistended, no rebound tenderness.   Extremities: Supple, no edema, no cyanosis, no clubbing.   Pulses: Pulses palpable bilaterally.   Skin: No bleeding or rash.   Neurologic: Alert and oriented x 3. No facial asymmetry. Moves all four limbs. No tremors.     Pertinent Lab Results:     Results from last 7 days   Lab Units 07/21/23  0607 07/20/23  1511   SODIUM mmol/L 136 138   POTASSIUM mmol/L 5.0 5.1   CHLORIDE mmol/L 91* 94*   CO2 mmol/L 40.7* 38.0*   BUN mg/dL 21 16   CREATININE mg/dL 0.74 0.66   CALCIUM mg/dL 8.8 9.1   BILIRUBIN mg/dL  --  0.6   ALK PHOS U/L  --  137*   ALT (SGPT) U/L  --  22   AST (SGOT) U/L  --  22   GLUCOSE mg/dL 161* 146*     Results from last 7 days   Lab Units 07/20/23  1511   WBC 10*3/mm3 6.79   HEMOGLOBIN g/dL 10.6*   HEMATOCRIT % 36.3   PLATELETS 10*3/mm3 256             Results from last 7 days   Lab Units 07/20/23  1511   PROBNP pg/mL 1,788.0*             Results from last 7 days   Lab Units 07/21/23  1530   PH, ARTERIAL pH units 7.360   PO2 ART mm Hg 66.5*   PCO2, ARTERIAL mm Hg 72.3*   HCO3 ART mmol/L 40.8*           Pertinent Radiology Results:    Imaging Results (All)       Procedure Component Value Units Date/Time    XR Chest 1 View [[id number]] Collected: 07/20/23 1538     Updated: 07/20/23 1545     Narrative:      PROCEDURE: XR CHEST 1 VW-        HISTORY: soa     COMPARISON: June 12, 2023.     FINDINGS: The heart is enlarged, but stable. Pulmonary vascular  congestion is stable. There is mild interstitial disease bilaterally  which is stable.  There is no pneumothorax. There are no acute osseous  abnormalities.       Impression:      No significant interval change.                       Images were reviewed, interpreted, and dictated by Dr. Helena Salcido MD  Transcribed by Charo Marshall PA-C.     This report was signed and finalized on 7/20/2023 3:45 PM by Helena Salcido MD.            Echo:    Results for orders placed during the hospital encounter of 03/21/23    Adult Transthoracic Echo Complete With Contrast if Necessary Per Protocol    Interpretation Summary  1.  Normal left ventricular size and systolic function, LVEF 65-70%.  2.  Mild concentric LVH.  3.  Normal LV diastolic filling pattern.  4.  Normal right ventricular size and systolic function.  5.  Normal left atrial volume index.  6.  No significant valvular abnormalities.    Condition on Discharge:      Stable.    Code status during the hospital stay:    Code Status and Medical Interventions:   Ordered at: 07/20/23 1815     Code Status (Patient has no pulse and is not breathing):    CPR (Attempt to Resuscitate)     Medical Interventions (Patient has pulse or is breathing):    Full Support     Release to patient:    Routine Release     Discharge Disposition:    Home or Self Care    Discharge Medications:       Discharge Medications        New Medications        Instructions Start Date   amiodarone 200 MG tablet  Commonly known as: PACERONE   200 mg, Oral, Every 12 Hours Scheduled      predniSONE 20 MG tablet  Commonly known as: DELTASONE   40 mg, Oral, Daily With Breakfast   Start Date: July 23, 2023            Changes to Medications        Instructions Start Date   cyclobenzaprine 5 MG tablet  Commonly known as: FLEXERIL  What changed: when to  take this   5 mg, Oral, 3 Times Daily PRN      furosemide 20 MG tablet  Commonly known as: LASIX  What changed:   when to take this  reasons to take this   TAKE ONE TABLET BY MOUTH DAILY      potassium chloride 10 MEQ CR tablet  What changed: additional instructions   10 mEq, Oral, 2 Times Daily             Continue These Medications        Instructions Start Date   acetaminophen 325 MG tablet  Commonly known as: TYLENOL   650 mg, Oral, Every 6 Hours PRN      albuterol (2.5 MG/3ML) 0.083% nebulizer solution  Commonly known as: PROVENTIL   2.5 mg, Nebulization, 4 Times Daily - RT      albuterol sulfate  (90 Base) MCG/ACT inhaler  Commonly known as: PROVENTIL HFA;VENTOLIN HFA;PROAIR HFA   Inhale 2 puffs by mouth every 4 to 6 hours as needed for cough      apixaban 5 MG tablet tablet  Commonly known as: ELIQUIS   5 mg, Oral, Every 12 Hours Scheduled      DULoxetine 60 MG capsule  Commonly known as: Cymbalta   60 mg, Oral, 2 Times Daily      Fluticasone-Umeclidin-Vilant 100-62.5-25 MCG/ACT inhaler  Commonly known as: TRELEGY   1 puff, Inhalation, Daily - RT, Rinse mouth out after use      lisinopril 10 MG tablet  Commonly known as: PRINIVIL,ZESTRIL   TAKE ONE TABLET BY MOUTH ONCE NIGHTLY      Magnesium Oxide -Mg Supplement 400 (240 Mg) MG tablet   400 mg, Oral, As Needed      omeprazole 40 MG capsule  Commonly known as: priLOSEC   40 mg, Oral, Nightly      pramipexole 1 MG tablet  Commonly known as: MIRAPEX   1 mg, Oral, 2 Times Daily PRN      rOPINIRole 3 MG tablet  Commonly known as: REQUIP   3 mg, Oral, Nightly, Take 1 hour before bedtime.             Stop These Medications      amLODIPine 2.5 MG tablet  Commonly known as: NORVASC     escitalopram 20 MG tablet  Commonly known as: LEXAPRO     ibuprofen 200 MG tablet  Commonly known as: ADVIL,MOTRIN     Sotalol HCl AF 80 MG tablet            Discharge Diet:   As tolerated    Activity at Discharge:   As tolerated    Follow-up Appointments:    Additional  Instructions for the Follow-ups that You Need to Schedule       Ambulatory Referral to Disease State Management   As directed      To dept: [facility] [[id number]]    What program(s) are you referring for?: COPD    Follow-up needed: Yes                Contact information for follow-up providers       Troy Umaña MD Follow up in 1 week(s).    Specialty: Internal Medicine  Contact information:  [address]  [address]  [facility] [address]  [phone]               Rio Rucker MD Follow up in 2 week(s).    Specialties: Cardiology, Interventional Radiology  Why: Needs hospital f/u with dr. rucker in next 1-2 weeks  Contact information:  [address] [facility] [address]  [address]  [facility] [id number] [phone]                       Contact information for after-discharge care       Durable Medical Equipment       [facility] .    Service: Durable Medical Equipment  Contact information:  [address] [facility] [address]  [facility] [address]  [phone]                                 Future Appointments   Date Time Provider Department Center   8/15/2023  9:00 AM [facility] CATH APPTS IN [facility] CATH J[facility]   9/18/2023  8:30 AM Rio Rucker MD [facility] CD BG R [facility]   10/16/2023  1:45 PM [facility] PULM [facility] - PFT RM [facility] PCC [facility] J[facility]   10/16/2023  2:15 PM Ann Moe APRN [facility] J[facility]AN     Test Results Pending at Discharge:           Lorraine Womack DO  07/22/23  11:47 EDT    Time: I spent 25 minutes on this discharge activity which included: face-to-face encounter with the patient, reviewing the data in the system, coordination of the care with the nursing staff as well as consultants, documentation, and entering orders.     Dictated utilizing Dragon dictation.      Electronically signed by Lorraine Womack DO at 07/22/23 3705

## 2023-07-24 ENCOUNTER — TRANSITIONAL CARE MANAGEMENT TELEPHONE ENCOUNTER (OUTPATIENT)
Dept: CALL CENTER | Facility: HOSPITAL | Age: 73
End: 2023-07-24
Payer: MEDICARE

## 2023-07-24 NOTE — OUTREACH NOTE
Call Center TCM Note      Flowsheet Row Responses   [facility] patient discharged from? [facility]   Does the patient have one of the following disease processes/diagnoses(primary or secondary)? COPD   TCM attempt successful? Yes   Call start time 0915   Call end time 0923   Discharge diagnosis COPD with acute exacerbation, A/C hypoxic/hypercapnic respiratory failure, A/C cor pulmonale/diastolic CHF, afib, chronic venous insufficency, HTN, obesity hypoventilation syndrome   Person spoke with today (if not patient) and relationship Patient   Meds reviewed with patient/caregiver? Yes  [New: amiodarone, prednisone.   Stopped: amlodipine, lexapro, ibuprofen, sotalol]   Does the patient have all medications ordered at discharge? Yes   Is the patient taking all medications as directed (includes completed medication regime)? Yes   Comments PCP Dr Troy Umaña. No Hospital follow up appt showing available for PCP. Message routed to office with appt need. Patient reports that she will call office back at a later time to schedule. She reports that she is out of town at [facility] right now.   Does the patient have an appointment with their PCP within 7-14 days of discharge? No appointments available   Nursing Interventions Routed TCM call to PCP office, PCP office requested to make appointment - message sent   Has home health visited the patient within 72 hours of discharge? N/A   Has all DME been delivered? Yes   DME comments Patient wearing trilogy at least 4hrs a day. When not weaing trilogy wearing O23L.   Pulse Ox monitoring Intermittent   Pulse Ox device source Patient   O2 Sat comments Reports 92-93%   O2 Sat: education provided Sat levels   Psychosocial issues? No   Did the patient receive a copy of their discharge instructions? Yes   Nursing interventions Reviewed instructions with patient   What is the patient's perception of their health status since discharge? Improving   Nursing Interventions Nurse provided  patient education   If the patient is a current smoker, are they able to teach back resources for cessation? Not a smoker   Is the patient/caregiver able to teach back the hierarchy of who to call/visit for symptoms/problems? PCP, Specialist, Home health nurse, Urgent Care, ED, 911 Yes   Is the patient able to teach back COPD zones? --  [Unable to review zones with patient today. Reports improvement with breathing using trilogy. ]   TCM call completed? Yes   Call end time 0923   Would this patient benefit from a Referral to [facility] Social Work? No   Is the patient interested in additional calls from an ambulatory ?  NOTE:  applies to high risk patients requiring additional follow-up. No            Giuliana Donohue RN    7/24/2023, 09:25 EDT

## 2023-07-31 LAB
QT INTERVAL: 460 MS
QTC INTERVAL: 524 MS

## 2023-08-01 ENCOUNTER — READMISSION MANAGEMENT (OUTPATIENT)
Dept: CALL CENTER | Facility: HOSPITAL | Age: 73
End: 2023-08-01
Payer: MEDICARE

## 2023-08-07 DIAGNOSIS — F41.8 DEPRESSION WITH ANXIETY: ICD-10-CM

## 2023-08-08 ENCOUNTER — OFFICE VISIT (OUTPATIENT)
Dept: CARDIOLOGY | Facility: CLINIC | Age: 73
End: 2023-08-08
Payer: MEDICARE

## 2023-08-08 VITALS
SYSTOLIC BLOOD PRESSURE: 122 MMHG | HEIGHT: 62 IN | OXYGEN SATURATION: 94 % | WEIGHT: 225 LBS | HEART RATE: 70 BPM | BODY MASS INDEX: 41.41 KG/M2 | DIASTOLIC BLOOD PRESSURE: 68 MMHG

## 2023-08-08 DIAGNOSIS — E66.01 MORBID OBESITY: ICD-10-CM

## 2023-08-08 DIAGNOSIS — J84.9 INTERSTITIAL LUNG DISEASE: ICD-10-CM

## 2023-08-08 DIAGNOSIS — I10 PRIMARY HYPERTENSION: ICD-10-CM

## 2023-08-08 DIAGNOSIS — I48.0 PAROXYSMAL ATRIAL FIBRILLATION: Primary | ICD-10-CM

## 2023-08-08 PROCEDURE — 99214 OFFICE O/P EST MOD 30 MIN: CPT | Performed by: INTERNAL MEDICINE

## 2023-08-08 PROCEDURE — 1159F MED LIST DOCD IN RCRD: CPT | Performed by: INTERNAL MEDICINE

## 2023-08-08 PROCEDURE — 93000 ELECTROCARDIOGRAM COMPLETE: CPT | Performed by: INTERNAL MEDICINE

## 2023-08-08 PROCEDURE — 3078F DIAST BP <80 MM HG: CPT | Performed by: INTERNAL MEDICINE

## 2023-08-08 PROCEDURE — 3074F SYST BP LT 130 MM HG: CPT | Performed by: INTERNAL MEDICINE

## 2023-08-08 PROCEDURE — 1160F RVW MEDS BY RX/DR IN RCRD: CPT | Performed by: INTERNAL MEDICINE

## 2023-08-08 RX ORDER — DULOXETIN HYDROCHLORIDE 60 MG/1
CAPSULE, DELAYED RELEASE ORAL
Qty: 60 CAPSULE | Refills: 1 | Status: SHIPPED | OUTPATIENT
Start: 2023-08-08

## 2023-08-08 RX ORDER — AMIODARONE HYDROCHLORIDE 200 MG/1
200 TABLET ORAL DAILY
Qty: 30 TABLET | Refills: 11 | Status: SHIPPED | OUTPATIENT
Start: 2023-08-08

## 2023-08-08 NOTE — PROGRESS NOTES
Subjective:     Encounter Date:08/08/2023      Patient ID: Claudia Stokes is a 73 y.o. female.    Chief Complaint: Atrial fibrillation  HPI  This is a 73-year-old female patient who was recently hospitalized for atrial fibrillation with rapid ventricular response.  She is chronically anticoagulated with Eliquis.  She has had no bleeding complications.  There have been no signs or symptoms to suggest cardioembolic events.  She is pharmacologically cardioverted with amiodarone therapy.  She has a history of hypertension and morbid obesity.  There is no personal history of known diabetes.  She has chronic left ventricular diastolic heart failure.  She has ongoing peripheral edema but has not been restricting her fluid intake or dietary sodium intake.  She is a non-smoker.  The following portions of the patient's history were reviewed and updated as appropriate: allergies, current medications, past family history, past medical history, past social history, past surgical history and problem  Review of Systems   Constitutional: Negative for chills, diaphoresis, fever, malaise/fatigue, weight gain and weight loss.   HENT:  Negative for ear discharge, hearing loss, hoarse voice and nosebleeds.    Eyes:  Negative for discharge, double vision, pain and photophobia.   Cardiovascular:  Negative for chest pain, claudication, cyanosis, dyspnea on exertion, irregular heartbeat, leg swelling, near-syncope, orthopnea, palpitations, paroxysmal nocturnal dyspnea and syncope.   Respiratory:  Negative for cough, hemoptysis, sputum production and wheezing.    Endocrine: Negative for cold intolerance, heat intolerance, polydipsia, polyphagia and polyuria.   Hematologic/Lymphatic: Negative for adenopathy and bleeding problem. Does not bruise/bleed easily.   Skin:  Negative for color change, flushing, itching and rash.   Musculoskeletal:  Negative for muscle cramps, muscle weakness, myalgias and stiffness.   Gastrointestinal:  Negative  for abdominal pain, diarrhea, hematemesis, hematochezia, nausea and vomiting.   Genitourinary:  Negative for dysuria, frequency and nocturia.   Neurological:  Negative for focal weakness, loss of balance, numbness, paresthesias and seizures.   Psychiatric/Behavioral:  Negative for altered mental status, hallucinations and suicidal ideas.    Allergic/Immunologic: Negative for HIV exposure, hives and persistent infections.         Current Outpatient Medications:     acetaminophen (TYLENOL) 325 MG tablet, Take 2 tablets by mouth Every 6 (Six) Hours As Needed for Mild Pain., Disp: , Rfl:     albuterol (PROVENTIL) (2.5 MG/3ML) 0.083% nebulizer solution, Take 2.5 mg by nebulization 4 (Four) Times a Day., Disp: , Rfl:     albuterol sulfate  (90 Base) MCG/ACT inhaler, Inhale 2 puffs by mouth every 4 to 6 hours as needed for cough, Disp: 18 g, Rfl: 6    cyclobenzaprine (FLEXERIL) 5 MG tablet, Take 1 tablet by mouth 3 (Three) Times a Day As Needed for Muscle Spasms. (Patient taking differently: Take 1 tablet by mouth 2 (Two) Times a Day As Needed for Muscle Spasms.), Disp: 30 tablet, Rfl: 1    DULoxetine (Cymbalta) 60 MG capsule, Take 1 capsule by mouth 2 (Two) Times a Day., Disp: 60 capsule, Rfl: 1    Fluticasone-Umeclidin-Vilant (TRELEGY) 100-62.5-25 MCG/ACT inhaler, Inhale 1 puff Daily. Rinse mouth out after use, Disp: 90 each, Rfl: 3    furosemide (LASIX) 20 MG tablet, Take 1 tablet by mouth As Needed (edema, weigh gain >3-5 lbs) for up to 15 days., Disp: 15 tablet, Rfl: 0    lisinopril (PRINIVIL,ZESTRIL) 10 MG tablet, TAKE ONE TABLET BY MOUTH ONCE NIGHTLY, Disp: 90 tablet, Rfl: 3    Magnesium Oxide 400 (240 Mg) MG tablet, Take 1 tablet by mouth As Needed., Disp: , Rfl:     omeprazole (priLOSEC) 40 MG capsule, Take 1 capsule by mouth Every Night., Disp: 90 capsule, Rfl: 3    pramipexole (MIRAPEX) 1 MG tablet, Take 1 tablet by mouth 2 (Two) Times a Day As Needed (pt states she takes 1mg at bedtime and 1mg at 0500).,  "Disp: , Rfl:     rOPINIRole (REQUIP) 3 MG tablet, Take 1 tablet by mouth Every Night. Take 1 hour before bedtime., Disp: 90 tablet, Rfl: 3    amiodarone (PACERONE) 200 MG tablet, Take 1 tablet by mouth Daily., Disp: 30 tablet, Rfl: 11    Objective:   Vitals and nursing note reviewed.   Constitutional:       Appearance: Healthy appearance. Not in distress.   Neck:      Vascular: No JVR. JVD normal.   Pulmonary:      Effort: Pulmonary effort is normal.      Breath sounds: Normal breath sounds. No wheezing. No rhonchi. No rales.   Chest:      Chest wall: Not tender to palpatation.   Cardiovascular:      PMI at left midclavicular line. Normal rate. Regular rhythm. Normal S1. Normal S2.       Murmurs: There is no murmur.      No gallop.  No click. No rub.   Pulses:     Intact distal pulses.   Edema:     Peripheral edema absent.   Abdominal:      General: Bowel sounds are normal.      Palpations: Abdomen is soft.      Tenderness: There is no abdominal tenderness.   Musculoskeletal: Normal range of motion.         General: No tenderness. Skin:     General: Skin is warm and dry.   Neurological:      General: No focal deficit present.      Mental Status: Alert and oriented to person, place and time.     Blood pressure 122/68, pulse 70, height 157.5 cm (62\"), weight 102 kg (225 lb), SpO2 94 %.   Lab Review:     Assessment:       1. Paroxysmal atrial fibrillation  Rhythm management strategy.  Pharmacologic cardioversion to normal sinus rhythm.  Anticoagulation therapy with Eliquis.    2. Primary hypertension  Acceptable blood pressure control.    3. Morbid obesity  Body mass index is greater than 42.  The obesity pattern is central.  This is due to excess calorie intake.    4. Interstitial lung disease  Fortunately the patient does not smoke.      ECG 12 Lead Dyspnea    Date/Time: 8/8/2023 11:43 AM  Performed by: Rio Best MD  Authorized by: Abdulaziz Rosado PA-C   Comparison: compared with previous ECG " "  Similar to previous ECG  Rhythm: sinus rhythm  Rate: normal  QRS axis: normal  Other findings: low voltage        Plan:     Advance Care Planning   ACP discussion was held with the patient during this visit. Patient has an advance directive (not in EMR), copy requested.     I have recommended decreasing amiodarone to 200 mg orally once per day.  After approximately 6-12 months we will consider decreasing to 100 mg/day.  Ideally we would like to down titrate over time to 100 mg every other day.  She has been advised that anticoagulation therapy with Eliquis will be considered lifelong therapy unless she develops a bleeding complication.    The importance of diet exercise and weight management cannot be overemphasized.    The patient has been counseled regarding the importance of fluid and sodium restriction.  I have recommended a less than 2 L/day fluid restriction and less than 1500 mg/day sodium restriction.  She has been extensively counseled regarding foods and beverages that are high in sodium content with instructions to avoid these.  She has been given a total of 6 different strategies for reducing her dietary sodium intake.  The patient has been educated that diuretic therapy for treatment of congestive heart failure, hypertension and/or edema only works to maximal efficacy when combined with fluid and sodium restriction.  She has been warned that diuretic therapy without fluid and sodium restriction will commonly lead to \"diuretic resistance\".    No further cardiovascular testing warranted at this time.      "

## 2023-08-09 ENCOUNTER — READMISSION MANAGEMENT (OUTPATIENT)
Dept: CALL CENTER | Facility: HOSPITAL | Age: 73
End: 2023-08-09
Payer: MEDICARE

## 2023-08-09 DIAGNOSIS — J43.9 PULMONARY EMPHYSEMA, UNSPECIFIED EMPHYSEMA TYPE: ICD-10-CM

## 2023-08-09 NOTE — OUTREACH NOTE
COPD/PN Week 3 Survey      Flowsheet Row Responses   [facility] patient discharged from? [facility]   Does the patient have one of the following disease processes/diagnoses(primary or secondary)? COPD   Week 3 attempt successful? Yes   Call start time 1227   Call end time 1228   Discharge diagnosis COPD with acute exacerbation, A/C hypoxic/hypercapnic respiratory failure, A/C cor pulmonale/diastolic CHF, afib, chronic venous insufficency, HTN, obesity hypoventilation syndrome   Person spoke with today (if not patient) and relationship Patient   Meds reviewed with patient/caregiver? Yes   Is the patient having any side effects they believe may be caused by any medication additions or changes? No   Does the patient have all medications ordered at discharge? Yes   Is the patient taking all medications as directed (includes completed medication regime)? Yes   Does the patient have a primary care provider?  Yes   Does the patient have an appointment with their PCP or specialist within 7 days of discharge? Yes   Has the patient kept scheduled appointments due by today? Yes   Psychosocial issues? No   Week 3 call completed? Yes   Graduated Yes   Wrap up additional comments Reports she is doing much better. No needs at this time.   Call end time 1228            Garrison AYERS - Registered Nurse

## 2023-08-15 ENCOUNTER — HOSPITAL ENCOUNTER (OUTPATIENT)
Dept: CARDIOLOGY | Facility: HOSPITAL | Age: 73
Discharge: HOME | End: 2023-08-15
Payer: MEDICARE

## 2023-08-22 ENCOUNTER — TELEPHONE (OUTPATIENT)
Dept: INTERNAL MEDICINE | Facility: CLINIC | Age: 73
End: 2023-08-22

## 2023-08-22 DIAGNOSIS — F41.8 DEPRESSION WITH ANXIETY: ICD-10-CM

## 2023-08-22 DIAGNOSIS — I10 PRIMARY HYPERTENSION: ICD-10-CM

## 2023-08-22 RX ORDER — LISINOPRIL 10 MG/1
10 TABLET ORAL NIGHTLY
Qty: 30 TABLET | Refills: 0 | Status: SHIPPED | OUTPATIENT
Start: 2023-08-22

## 2023-08-22 RX ORDER — DULOXETIN HYDROCHLORIDE 60 MG/1
60 CAPSULE, DELAYED RELEASE ORAL 2 TIMES DAILY
Qty: 60 CAPSULE | Refills: 1 | Status: SHIPPED | OUTPATIENT
Start: 2023-08-22

## 2023-08-22 RX ORDER — CYCLOBENZAPRINE HCL 5 MG
5 TABLET ORAL 3 TIMES DAILY PRN
Qty: 30 TABLET | Refills: 1 | Status: SHIPPED | OUTPATIENT
Start: 2023-08-22

## 2023-08-22 NOTE — TELEPHONE ENCOUNTER
Caller: Claudia Stokes    Relationship: Self    Best call back number: [phone]     Requested Prescriptions:   Requested Prescriptions     Pending Prescriptions Disp Refills    amiodarone (PACERONE) 200 MG tablet 30 tablet 11     Sig: Take 1 tablet by mouth Daily.        Pharmacy where request should be sent: [facility] [id number] [address] AT [facility] [phone] [facility] [phone] FX     Last office visit with prescribing clinician: 8/8/2023   Last telemedicine visit with prescribing clinician: Visit date not found   Next office visit with prescribing clinician: 9/18/2023     Does the patient have less than a 3 day supply:  [] Yes  [x] No    Would you like a call back once the refill request has been completed: [x] Yes [] No    If the office needs to give you a call back, can they leave a voicemail: [x] Yes [] No    Ky Bolivar Rep   08/22/23 15:37 EDT

## 2023-08-22 NOTE — TELEPHONE ENCOUNTER
I called patient, no answer left vm. Patient received a year supply of Requip so medication is not needed at this time

## 2023-08-22 NOTE — TELEPHONE ENCOUNTER
Caller: Claudia Stokes    Relationship: Self    Best call back number:  [phone]    Requested Prescriptions:   Requested Prescriptions      No prescriptions requested or ordered in this encounter      rOPINIRole (REQUIP) 3 MG tablet     Pharmacy where request should be sent:      [facility] [id number] [place], [place] - [address] AT [facility] [phone] [facility] [phone] FX     Last office visit with prescribing clinician: Visit date not found   Last telemedicine visit with prescribing clinician: Visit date not found   Next office visit with prescribing clinician: 9/20/2023     Does the patient have less than a 3 day supply:  [] Yes  [x] No    Would you like a call back once the refill request has been completed: [] Yes [x] No    If the office needs to give you a call back, can they leave a voicemail: [] Yes [x] No    Amairani Smith, PCT   08/22/23 11:58 EDT

## 2023-08-23 RX ORDER — AMIODARONE HYDROCHLORIDE 200 MG/1
200 TABLET ORAL DAILY
Qty: 30 TABLET | Refills: 11 | OUTPATIENT
Start: 2023-08-23

## 2023-09-01 DIAGNOSIS — J44.9 CHRONIC OBSTRUCTIVE PULMONARY DISEASE, UNSPECIFIED COPD TYPE: ICD-10-CM

## 2023-09-01 NOTE — TELEPHONE ENCOUNTER
Caller: Claudia Stokes    Relationship: Self    Best call back number: [phone]     Requested Prescriptions:   Requested Prescriptions     Pending Prescriptions Disp Refills    Fluticasone-Umeclidin-Vilant (TRELEGY) 100-62.5-25 MCG/ACT inhaler 90 each 3     Sig: Inhale 1 puff Daily. Rinse mouth out after use        Pharmacy where request should be sent:   [facility] [id number] [address] [facility] [address] [facility] [phone] [facility] [phone]      Last office visit with prescribing clinician: Visit date not found   Last telemedicine visit with prescribing clinician: Visit date not found   Next office visit with prescribing clinician: 10/16/2023     Additional details provided by patient: PT IS OUT AND NEEDS ASAP    Does the patient have less than a 3 day supply:  [x] Yes  [] No    Would you like a call back once the refill request has been completed: [x] Yes [] No    If the office needs to give you a call back, can they leave a voicemail: [x] Yes [] No    Ky Witt Rep   09/01/23 15:24 EDT

## 2023-09-24 DIAGNOSIS — F41.8 DEPRESSION WITH ANXIETY: ICD-10-CM

## 2023-09-25 RX ORDER — DULOXETIN HYDROCHLORIDE 60 MG/1
CAPSULE, DELAYED RELEASE ORAL
Qty: 180 CAPSULE | Refills: 1 | Status: SHIPPED | OUTPATIENT
Start: 2023-09-25